# Patient Record
Sex: MALE | Race: WHITE | Employment: OTHER | URBAN - METROPOLITAN AREA
[De-identification: names, ages, dates, MRNs, and addresses within clinical notes are randomized per-mention and may not be internally consistent; named-entity substitution may affect disease eponyms.]

---

## 2017-01-16 ENCOUNTER — TELEPHONE (OUTPATIENT)
Dept: INTERNAL MEDICINE CLINIC | Age: 77
End: 2017-01-16

## 2017-01-19 ENCOUNTER — OFFICE VISIT (OUTPATIENT)
Dept: INTERNAL MEDICINE CLINIC | Age: 77
End: 2017-01-19

## 2017-01-19 VITALS
BODY MASS INDEX: 35.46 KG/M2 | HEART RATE: 64 BPM | WEIGHT: 234 LBS | SYSTOLIC BLOOD PRESSURE: 150 MMHG | DIASTOLIC BLOOD PRESSURE: 74 MMHG | HEIGHT: 68 IN

## 2017-01-19 DIAGNOSIS — M79.601 PARESTHESIA AND PAIN OF BOTH UPPER EXTREMITIES: ICD-10-CM

## 2017-01-19 DIAGNOSIS — M79.602 PARESTHESIA AND PAIN OF BOTH UPPER EXTREMITIES: ICD-10-CM

## 2017-01-19 DIAGNOSIS — I10 ESSENTIAL HYPERTENSION, BENIGN: Primary | ICD-10-CM

## 2017-01-19 DIAGNOSIS — R20.2 PARESTHESIA AND PAIN OF BOTH UPPER EXTREMITIES: ICD-10-CM

## 2017-01-19 DIAGNOSIS — C43.62 MALIGNANT MELANOMA OF SKIN OF LEFT UPPER EXTREMITY, INCLUDING SHOULDER (HCC): ICD-10-CM

## 2017-01-19 PROCEDURE — 99214 OFFICE O/P EST MOD 30 MIN: CPT | Performed by: INTERNAL MEDICINE

## 2017-02-09 ENCOUNTER — OFFICE VISIT (OUTPATIENT)
Dept: INTERNAL MEDICINE CLINIC | Age: 77
End: 2017-02-09

## 2017-02-09 VITALS
HEIGHT: 68 IN | BODY MASS INDEX: 35.31 KG/M2 | SYSTOLIC BLOOD PRESSURE: 140 MMHG | HEART RATE: 64 BPM | TEMPERATURE: 98.1 F | DIASTOLIC BLOOD PRESSURE: 80 MMHG | WEIGHT: 233 LBS

## 2017-02-09 DIAGNOSIS — R05.9 COUGH: ICD-10-CM

## 2017-02-09 DIAGNOSIS — J01.10 ACUTE FRONTAL SINUSITIS, RECURRENCE NOT SPECIFIED: Primary | ICD-10-CM

## 2017-02-09 PROCEDURE — 99213 OFFICE O/P EST LOW 20 MIN: CPT | Performed by: INTERNAL MEDICINE

## 2017-02-09 RX ORDER — LEVOFLOXACIN 500 MG/1
500 TABLET, FILM COATED ORAL DAILY
Qty: 10 TABLET | Refills: 0 | Status: SHIPPED | OUTPATIENT
Start: 2017-02-09 | End: 2017-02-19

## 2017-03-02 ENCOUNTER — TELEPHONE (OUTPATIENT)
Dept: INTERNAL MEDICINE CLINIC | Age: 77
End: 2017-03-02

## 2017-07-18 ENCOUNTER — TELEPHONE (OUTPATIENT)
Dept: PULMONOLOGY | Age: 77
End: 2017-07-18

## 2017-07-18 ENCOUNTER — OFFICE VISIT (OUTPATIENT)
Dept: INTERNAL MEDICINE CLINIC | Age: 77
End: 2017-07-18

## 2017-07-18 DIAGNOSIS — R91.8 HILAR MASS: ICD-10-CM

## 2017-07-18 DIAGNOSIS — C43.62 MALIGNANT MELANOMA OF SKIN OF LEFT UPPER EXTREMITY, INCLUDING SHOULDER (HCC): Primary | ICD-10-CM

## 2017-07-18 DIAGNOSIS — I10 BENIGN ESSENTIAL HYPERTENSION: ICD-10-CM

## 2017-07-18 PROCEDURE — 99214 OFFICE O/P EST MOD 30 MIN: CPT | Performed by: INTERNAL MEDICINE

## 2017-07-25 ENCOUNTER — TELEPHONE (OUTPATIENT)
Dept: PULMONOLOGY | Age: 77
End: 2017-07-25

## 2017-07-25 ENCOUNTER — TELEPHONE (OUTPATIENT)
Dept: INTERNAL MEDICINE CLINIC | Age: 77
End: 2017-07-25

## 2017-07-28 ENCOUNTER — TELEPHONE (OUTPATIENT)
Dept: INTERNAL MEDICINE CLINIC | Age: 77
End: 2017-07-28

## 2017-07-31 ENCOUNTER — TELEPHONE (OUTPATIENT)
Dept: INTERNAL MEDICINE CLINIC | Age: 77
End: 2017-07-31

## 2017-08-06 PROBLEM — Z51.12 ENCOUNTER FOR ANTINEOPLASTIC IMMUNOTHERAPY: Status: ACTIVE | Noted: 2017-08-06

## 2017-10-06 ENCOUNTER — OFFICE VISIT (OUTPATIENT)
Dept: INTERNAL MEDICINE CLINIC | Age: 77
End: 2017-10-06

## 2017-10-06 VITALS
SYSTOLIC BLOOD PRESSURE: 138 MMHG | DIASTOLIC BLOOD PRESSURE: 80 MMHG | HEART RATE: 76 BPM | WEIGHT: 241 LBS | BODY MASS INDEX: 36.53 KG/M2

## 2017-10-06 DIAGNOSIS — C43.62 MALIGNANT MELANOMA OF SKIN OF LEFT UPPER EXTREMITY, INCLUDING SHOULDER (HCC): ICD-10-CM

## 2017-10-06 DIAGNOSIS — I10 ESSENTIAL HYPERTENSION, BENIGN: ICD-10-CM

## 2017-10-06 DIAGNOSIS — R60.0 LOCALIZED EDEMA: Primary | ICD-10-CM

## 2017-10-06 PROCEDURE — 99214 OFFICE O/P EST MOD 30 MIN: CPT | Performed by: INTERNAL MEDICINE

## 2017-10-06 RX ORDER — SPIRONOLACTONE 50 MG/1
25 TABLET, FILM COATED ORAL 2 TIMES DAILY
Qty: 180 TABLET | Refills: 3 | Status: SHIPPED | OUTPATIENT
Start: 2017-10-06 | End: 2018-10-26 | Stop reason: SDUPTHER

## 2017-10-06 RX ORDER — FUROSEMIDE 20 MG/1
TABLET ORAL DAILY PRN
Status: ON HOLD | COMMUNITY
Start: 2017-09-20 | End: 2019-07-22

## 2017-10-06 NOTE — MR AVS SNAPSHOT
After Visit Summary             Megan Lord   10/6/2017 9:00 AM   Office Visit    Description:  Male : 1940   Provider:  Ruslan Kamara MD   Department:  [facility]              Your Follow-Up and Future Appointments         Below is a list of your follow-up and future appointments. This may not be a complete list as you may have made appointments directly with providers that we are not aware of or your providers may have made some for you. Please call your providers to confirm appointments. It is important to keep your appointments. Please bring your current insurance card, photo ID, co-pay, and all medication bottles to your appointment. If self-pay, payment is expected at the time of service. Your To-Do List     Future Appointments Provider Department Dept Phone    2018 8:30 AM Ruslan Kamara MD [facility] [phone]    Please arrive 15 minutes prior to appointment, bring photo ID and insurance card. Follow-Up    Return if symptoms worsen or fail to improve. Information from Your Visit        Department     Name Address Phone Fax    [facility] [address] [employer] [phone]      Vital Signs     Blood Pressure Pulse Weight Body Mass Index Smoking Status       138/80 76 241 lb (109.3 kg) 36.53 kg/m2 Former Smoker       Additional Information about your Body Mass Index (BMI)           Your BMI as listed above is considered obese (30 or more). BMI is an estimate of body fat, calculated from your height and weight. The higher your BMI, the greater your risk of heart disease, high blood pressure, type 2 diabetes, stroke, gallstones, arthritis, sleep apnea, and certain cancers. BMI is not perfect. It may overestimate body fat in athletes and people who are more muscular.   Even a small weight loss (between 5 and 10 percent of your current weight) by decreasing your calorie intake and becoming more physically active will help lower your risk of developing or worsening diseases associated with obesity. Learn more at: [email or web address]             Today's Medication Changes          These changes are accurate as of: 10/6/17  9:27 AM.  If you have any questions, ask your nurse or doctor. START taking these medications           spironolactone 50 MG tablet   Commonly known as:  ALDACTONE   Instructions: Take 0.5 tablets by mouth 2 times daily   Quantity:  180 tablet   Refills:  3   Started by: Shwetha Hathaway MD         STOP taking these medications           clobetasol 0.05 % ointment   Commonly known as:  Alexi Lawman by: Shwetha Hathaway MD       nystatin 931984 UNIT/ML suspension   Commonly known as:  MYCOSTATIN   Stopped by: Shwetha Hathaway MD       potassium chloride 10 MEQ extended release tablet   Commonly known as:  KLOR-CON M   Stopped by: Shwetha Hathaway MD       predniSONE 5 MG tablet   Commonly known as:  Puneet Jac by: Shwetha Hathaway MD       Trametinib Dimethyl Sulfoxide 2 MG Tabs   Commonly known as:  MEKINIST   Stopped by:   Shwetha Hathaway MD            Where to Get Your Medications      These medications were sent to [address] [place] [address], [address] [facility] [phone]  [address] [place] [address],[address] [id number]     Phone:  [phone]     spironolactone 50 MG tablet               Your Current Medications Are              spironolactone (ALDACTONE) 50 MG tablet Take 0.5 tablets by mouth 2 times daily    furosemide (LASIX) 20 MG tablet     Dabrafenib Mesylate 75 MG CAPS Take 150 mg by mouth 2 times daily    aspirin 325 MG tablet Take 1 tablet by mouth every 6 hours as needed Last dose 3/7/16    magnesium hydroxide (MILK OF MAGNESIA) 400 MG/5ML suspension Take by

## 2017-10-06 NOTE — PROGRESS NOTES
symmetric, no tenderness/mass/nodules, no carotid bruit or JVD   Lungs:   Clear to auscultation bilaterally, respirations unlabored   Chest Wall:  No tenderness or deformity   Heart:  Regular rate and rhythm, S1, S2 normal, no murmur, rub or gallop, he does have 2+ edema to the midcalf bilaterally    Abdomen:   Soft, non-tender, bowel sounds active all four quadrants,  no masses, no organomegaly genitourinary no hernias, no testicular masses    Skin: Skin color, texture, turgor normal, no rashes or lesions   Lymph nodes: Cervical, supraclavicular  Adenopathy is absent   Extremities  2+ edema to the knees bilaterally      No components found for: CHLPL  Lab Results   Component Value Date    TRIG 101 09/30/2016    TRIG 95 09/23/2015    TRIG 101 08/11/2014     Lab Results   Component Value Date    HDL 49 09/30/2016    HDL 56 09/23/2015    HDL 53 08/11/2014     Lab Results   Component Value Date    LDLCALC 120 (H) 09/30/2016    LDLCALC 136 (H) 09/23/2015    LDLCALC 132 (H) 08/11/2014     Lab Results   Component Value Date    LABVLDL 20 09/30/2016    LABVLDL 19 09/23/2015    LABVLDL 20 08/11/2014     Lab Results   Component Value Date    CREATININE 1.1 08/23/2017         ASSESSMENT/PLAN[de-identified]       1. Localized edema     2. Malignant melanoma of skin of left upper extremity, including shoulder (HCC)     3. Essential hypertension, benign          I see no evidence of acute liver or kidney disease he does not have symptoms of heart failure.     I believe his symptoms are secondary to body habitus, venous insufficiency    Encouraged the use of compression stockings which may be beneficial for him    Start spironolactone since he continues to be symptomatic and Lasix is not effective    Advised to stop the potassium while taking spironolactone

## 2017-11-28 ENCOUNTER — TELEPHONE (OUTPATIENT)
Dept: INTERNAL MEDICINE CLINIC | Age: 77
End: 2017-11-28

## 2018-01-23 ENCOUNTER — OFFICE VISIT (OUTPATIENT)
Dept: INTERNAL MEDICINE CLINIC | Age: 78
End: 2018-01-23

## 2018-01-23 VITALS
WEIGHT: 237 LBS | BODY MASS INDEX: 35.92 KG/M2 | DIASTOLIC BLOOD PRESSURE: 80 MMHG | HEART RATE: 72 BPM | SYSTOLIC BLOOD PRESSURE: 138 MMHG

## 2018-01-23 DIAGNOSIS — I10 ESSENTIAL HYPERTENSION, BENIGN: Primary | ICD-10-CM

## 2018-01-23 DIAGNOSIS — C43.62 MALIGNANT MELANOMA OF SKIN OF LEFT UPPER EXTREMITY, INCLUDING SHOULDER (HCC): ICD-10-CM

## 2018-01-23 PROCEDURE — G8484 FLU IMMUNIZE NO ADMIN: HCPCS | Performed by: INTERNAL MEDICINE

## 2018-01-23 PROCEDURE — 4040F PNEUMOC VAC/ADMIN/RCVD: CPT | Performed by: INTERNAL MEDICINE

## 2018-01-23 PROCEDURE — 99214 OFFICE O/P EST MOD 30 MIN: CPT | Performed by: INTERNAL MEDICINE

## 2018-01-23 PROCEDURE — G8427 DOCREV CUR MEDS BY ELIG CLIN: HCPCS | Performed by: INTERNAL MEDICINE

## 2018-01-23 PROCEDURE — 3288F FALL RISK ASSESSMENT DOCD: CPT | Performed by: INTERNAL MEDICINE

## 2018-01-23 PROCEDURE — 1036F TOBACCO NON-USER: CPT | Performed by: INTERNAL MEDICINE

## 2018-01-23 PROCEDURE — G8417 CALC BMI ABV UP PARAM F/U: HCPCS | Performed by: INTERNAL MEDICINE

## 2018-01-23 PROCEDURE — 1123F ACP DISCUSS/DSCN MKR DOCD: CPT | Performed by: INTERNAL MEDICINE

## 2018-04-09 ENCOUNTER — OFFICE VISIT (OUTPATIENT)
Dept: INTERNAL MEDICINE CLINIC | Age: 78
End: 2018-04-09

## 2018-04-09 ENCOUNTER — TELEPHONE (OUTPATIENT)
Dept: INTERNAL MEDICINE CLINIC | Age: 78
End: 2018-04-09

## 2018-04-09 VITALS
HEART RATE: 80 BPM | BODY MASS INDEX: 35.4 KG/M2 | WEIGHT: 239 LBS | SYSTOLIC BLOOD PRESSURE: 120 MMHG | HEIGHT: 69 IN | DIASTOLIC BLOOD PRESSURE: 60 MMHG

## 2018-04-09 DIAGNOSIS — L03.116 CELLULITIS OF LEFT LOWER EXTREMITY: Primary | ICD-10-CM

## 2018-04-09 PROCEDURE — 99213 OFFICE O/P EST LOW 20 MIN: CPT | Performed by: INTERNAL MEDICINE

## 2018-04-09 PROCEDURE — 1036F TOBACCO NON-USER: CPT | Performed by: INTERNAL MEDICINE

## 2018-04-09 PROCEDURE — 1123F ACP DISCUSS/DSCN MKR DOCD: CPT | Performed by: INTERNAL MEDICINE

## 2018-04-09 PROCEDURE — G8428 CUR MEDS NOT DOCUMENT: HCPCS | Performed by: INTERNAL MEDICINE

## 2018-04-09 PROCEDURE — G8417 CALC BMI ABV UP PARAM F/U: HCPCS | Performed by: INTERNAL MEDICINE

## 2018-04-09 PROCEDURE — 4040F PNEUMOC VAC/ADMIN/RCVD: CPT | Performed by: INTERNAL MEDICINE

## 2018-04-09 RX ORDER — SULFAMETHOXAZOLE AND TRIMETHOPRIM 800; 160 MG/1; MG/1
1 TABLET ORAL 2 TIMES DAILY
Qty: 28 TABLET | Refills: 0 | Status: SHIPPED | OUTPATIENT
Start: 2018-04-09 | End: 2018-04-23

## 2018-04-11 ENCOUNTER — NURSE ONLY (OUTPATIENT)
Dept: INTERNAL MEDICINE CLINIC | Age: 78
End: 2018-04-11

## 2018-04-11 ENCOUNTER — TELEPHONE (OUTPATIENT)
Dept: INTERNAL MEDICINE CLINIC | Age: 78
End: 2018-04-11

## 2018-04-11 DIAGNOSIS — I49.9 IRREGULAR HEART BEAT: Primary | ICD-10-CM

## 2018-04-11 DIAGNOSIS — I49.9 IRREGULAR HEART BEAT: ICD-10-CM

## 2018-04-11 LAB — POTASSIUM SERPL-SCNC: 4.4 MMOL/L (ref 3.5–5.1)

## 2018-04-11 PROCEDURE — 93000 ELECTROCARDIOGRAM COMPLETE: CPT | Performed by: INTERNAL MEDICINE

## 2018-04-11 PROCEDURE — 99212 OFFICE O/P EST SF 10 MIN: CPT | Performed by: INTERNAL MEDICINE

## 2018-04-20 LAB
CHOLESTEROL, TOTAL: 191 MG/DL (ref 0–199)
HDLC SERPL-MCNC: 43 MG/DL (ref 40–60)
LDL CHOLESTEROL CALCULATED: 129 MG/DL
TRIGL SERPL-MCNC: 94 MG/DL (ref 0–150)
VLDLC SERPL CALC-MCNC: 19 MG/DL

## 2018-05-25 ENCOUNTER — OFFICE VISIT (OUTPATIENT)
Dept: PULMONOLOGY | Age: 78
End: 2018-05-25

## 2018-05-25 VITALS
HEART RATE: 79 BPM | SYSTOLIC BLOOD PRESSURE: 128 MMHG | DIASTOLIC BLOOD PRESSURE: 72 MMHG | BODY MASS INDEX: 35.51 KG/M2 | OXYGEN SATURATION: 97 % | WEIGHT: 237 LBS

## 2018-05-25 DIAGNOSIS — R59.0 MEDIASTINAL ADENOPATHY: ICD-10-CM

## 2018-05-25 DIAGNOSIS — C78.02 SECONDARY MALIGNANT NEOPLASM OF LEFT LUNG (HCC): Primary | ICD-10-CM

## 2018-05-25 PROCEDURE — 99214 OFFICE O/P EST MOD 30 MIN: CPT | Performed by: INTERNAL MEDICINE

## 2018-05-25 PROCEDURE — G8427 DOCREV CUR MEDS BY ELIG CLIN: HCPCS | Performed by: INTERNAL MEDICINE

## 2018-05-25 PROCEDURE — 1036F TOBACCO NON-USER: CPT | Performed by: INTERNAL MEDICINE

## 2018-05-25 PROCEDURE — G8417 CALC BMI ABV UP PARAM F/U: HCPCS | Performed by: INTERNAL MEDICINE

## 2018-05-25 PROCEDURE — 4040F PNEUMOC VAC/ADMIN/RCVD: CPT | Performed by: INTERNAL MEDICINE

## 2018-05-25 PROCEDURE — 1123F ACP DISCUSS/DSCN MKR DOCD: CPT | Performed by: INTERNAL MEDICINE

## 2018-05-29 ENCOUNTER — TELEPHONE (OUTPATIENT)
Dept: PULMONOLOGY | Age: 78
End: 2018-05-29

## 2018-05-30 PROBLEM — R59.0 MEDIASTINAL ADENOPATHY: Status: ACTIVE | Noted: 2018-05-30

## 2018-06-08 ENCOUNTER — HOSPITAL ENCOUNTER (OUTPATIENT)
Dept: ENDOSCOPY | Age: 78
Discharge: HOME | End: 2018-06-08
Attending: INTERNAL MEDICINE | Admitting: INTERNAL MEDICINE

## 2018-06-08 VITALS
BODY MASS INDEX: 35.92 KG/M2 | HEART RATE: 66 BPM | RESPIRATION RATE: 16 BRPM | WEIGHT: 237 LBS | DIASTOLIC BLOOD PRESSURE: 53 MMHG | HEIGHT: 68 IN | SYSTOLIC BLOOD PRESSURE: 112 MMHG | OXYGEN SATURATION: 99 % | TEMPERATURE: 98.2 F

## 2018-06-08 LAB
APTT: 32 SEC (ref 26–36)
BASOPHILS ABSOLUTE: 0.2 K/UL (ref 0–0.2)
BASOPHILS RELATIVE PERCENT: 2.3 %
EOSINOPHILS ABSOLUTE: 0.7 K/UL (ref 0–0.6)
EOSINOPHILS RELATIVE PERCENT: 9.4 %
HCT VFR BLD CALC: 45 % (ref 40.5–52.5)
HEMOGLOBIN: 14.8 G/DL (ref 13.5–17.5)
INR BLD: 1.04 (ref 0.86–1.14)
LYMPHOCYTES ABSOLUTE: 0.9 K/UL (ref 1–5.1)
LYMPHOCYTES RELATIVE PERCENT: 12.3 %
MCH RBC QN AUTO: 27.7 PG (ref 26–34)
MCHC RBC AUTO-ENTMCNC: 33 G/DL (ref 31–36)
MCV RBC AUTO: 84.1 FL (ref 80–100)
MONOCYTES ABSOLUTE: 0.7 K/UL (ref 0–1.3)
MONOCYTES RELATIVE PERCENT: 9.8 %
NEUTROPHILS ABSOLUTE: 4.6 K/UL (ref 1.7–7.7)
NEUTROPHILS RELATIVE PERCENT: 66.2 %
PDW BLD-RTO: 14 % (ref 12.4–15.4)
PLATELET # BLD: 304 K/UL (ref 135–450)
PMV BLD AUTO: 6.8 FL (ref 5–10.5)
PROTHROMBIN TIME: 11.9 SEC (ref 9.8–13)
RBC # BLD: 5.36 M/UL (ref 4.2–5.9)
WBC # BLD: 7 K/UL (ref 4–11)

## 2018-06-08 PROCEDURE — 31652 BRONCH EBUS SAMPLNG 1/2 NODE: CPT | Performed by: INTERNAL MEDICINE

## 2018-06-08 RX ORDER — DIPHENHYDRAMINE HCL 25 MG
25 CAPSULE ORAL PRN
Status: ON HOLD | COMMUNITY
End: 2020-01-01 | Stop reason: HOSPADM

## 2018-06-08 RX ORDER — PSEUDOEPHEDRINE HYDROCHLORIDE 30 MG/1
30 TABLET ORAL EVERY 4 HOURS PRN
Status: ON HOLD | COMMUNITY
End: 2019-07-29 | Stop reason: HOSPADM

## 2018-06-08 ASSESSMENT — ENCOUNTER SYMPTOMS: SHORTNESS OF BREATH: 1

## 2018-06-08 ASSESSMENT — PAIN SCALES - GENERAL
PAINLEVEL_OUTOF10: 0

## 2018-06-08 ASSESSMENT — PAIN - FUNCTIONAL ASSESSMENT: PAIN_FUNCTIONAL_ASSESSMENT: 0-10

## 2018-06-10 LAB
CULTURE, RESPIRATORY: NORMAL
GRAM STAIN RESULT: NORMAL

## 2018-07-09 LAB
FUNGUS (MYCOLOGY) CULTURE: NORMAL
FUNGUS STAIN: NORMAL

## 2018-07-24 ENCOUNTER — OFFICE VISIT (OUTPATIENT)
Dept: INTERNAL MEDICINE CLINIC | Age: 78
End: 2018-07-24

## 2018-07-24 VITALS
WEIGHT: 239 LBS | BODY MASS INDEX: 36.34 KG/M2 | SYSTOLIC BLOOD PRESSURE: 136 MMHG | DIASTOLIC BLOOD PRESSURE: 80 MMHG | HEART RATE: 64 BPM

## 2018-07-24 DIAGNOSIS — I10 ESSENTIAL HYPERTENSION, BENIGN: Primary | ICD-10-CM

## 2018-07-24 DIAGNOSIS — C78.02 SECONDARY MALIGNANT NEOPLASM OF LEFT LUNG (HCC): ICD-10-CM

## 2018-07-24 LAB
AFB CULTURE (MYCOBACTERIA): NORMAL
AFB SMEAR: NORMAL

## 2018-07-24 PROCEDURE — 99214 OFFICE O/P EST MOD 30 MIN: CPT | Performed by: INTERNAL MEDICINE

## 2018-07-24 PROCEDURE — 1101F PT FALLS ASSESS-DOCD LE1/YR: CPT | Performed by: INTERNAL MEDICINE

## 2018-07-24 PROCEDURE — G8417 CALC BMI ABV UP PARAM F/U: HCPCS | Performed by: INTERNAL MEDICINE

## 2018-07-24 PROCEDURE — 1036F TOBACCO NON-USER: CPT | Performed by: INTERNAL MEDICINE

## 2018-07-24 PROCEDURE — G8510 SCR DEP NEG, NO PLAN REQD: HCPCS | Performed by: INTERNAL MEDICINE

## 2018-07-24 PROCEDURE — G8427 DOCREV CUR MEDS BY ELIG CLIN: HCPCS | Performed by: INTERNAL MEDICINE

## 2018-07-24 PROCEDURE — 4040F PNEUMOC VAC/ADMIN/RCVD: CPT | Performed by: INTERNAL MEDICINE

## 2018-07-24 PROCEDURE — 1123F ACP DISCUSS/DSCN MKR DOCD: CPT | Performed by: INTERNAL MEDICINE

## 2018-07-24 ASSESSMENT — PATIENT HEALTH QUESTIONNAIRE - PHQ9
SUM OF ALL RESPONSES TO PHQ QUESTIONS 1-9: 1
1. LITTLE INTEREST OR PLEASURE IN DOING THINGS: 1
SUM OF ALL RESPONSES TO PHQ9 QUESTIONS 1 & 2: 1
2. FEELING DOWN, DEPRESSED OR HOPELESS: 0

## 2018-10-18 ENCOUNTER — OFFICE VISIT (OUTPATIENT)
Dept: INTERNAL MEDICINE CLINIC | Age: 78
End: 2018-10-18
Payer: MEDICARE

## 2018-10-18 VITALS
DIASTOLIC BLOOD PRESSURE: 78 MMHG | HEART RATE: 72 BPM | SYSTOLIC BLOOD PRESSURE: 120 MMHG | WEIGHT: 237 LBS | BODY MASS INDEX: 36.04 KG/M2

## 2018-10-18 DIAGNOSIS — C44.99 SKIN CARCINOMA: Primary | ICD-10-CM

## 2018-10-18 PROCEDURE — 1036F TOBACCO NON-USER: CPT | Performed by: INTERNAL MEDICINE

## 2018-10-18 PROCEDURE — G8417 CALC BMI ABV UP PARAM F/U: HCPCS | Performed by: INTERNAL MEDICINE

## 2018-10-18 PROCEDURE — G8427 DOCREV CUR MEDS BY ELIG CLIN: HCPCS | Performed by: INTERNAL MEDICINE

## 2018-10-18 PROCEDURE — 1123F ACP DISCUSS/DSCN MKR DOCD: CPT | Performed by: INTERNAL MEDICINE

## 2018-10-18 PROCEDURE — 99213 OFFICE O/P EST LOW 20 MIN: CPT | Performed by: INTERNAL MEDICINE

## 2018-10-18 PROCEDURE — G8484 FLU IMMUNIZE NO ADMIN: HCPCS | Performed by: INTERNAL MEDICINE

## 2018-10-18 PROCEDURE — 1101F PT FALLS ASSESS-DOCD LE1/YR: CPT | Performed by: INTERNAL MEDICINE

## 2018-10-18 PROCEDURE — 4040F PNEUMOC VAC/ADMIN/RCVD: CPT | Performed by: INTERNAL MEDICINE

## 2018-10-26 RX ORDER — SPIRONOLACTONE 50 MG/1
TABLET, FILM COATED ORAL
Qty: 90 TABLET | Refills: 3 | Status: ON HOLD | OUTPATIENT
Start: 2018-10-26 | End: 2019-07-22

## 2019-01-01 ENCOUNTER — OFFICE VISIT (OUTPATIENT)
Dept: INTERNAL MEDICINE CLINIC | Age: 79
End: 2019-01-01
Payer: MEDICARE

## 2019-01-01 VITALS
SYSTOLIC BLOOD PRESSURE: 122 MMHG | BODY MASS INDEX: 29.32 KG/M2 | DIASTOLIC BLOOD PRESSURE: 68 MMHG | HEART RATE: 61 BPM | WEIGHT: 192.8 LBS

## 2019-01-01 DIAGNOSIS — I10 BENIGN ESSENTIAL HYPERTENSION: ICD-10-CM

## 2019-01-01 DIAGNOSIS — C43.62 MALIGNANT MELANOMA OF SKIN OF LEFT UPPER EXTREMITY, INCLUDING SHOULDER (HCC): ICD-10-CM

## 2019-01-01 DIAGNOSIS — E87.6 HYPOKALEMIA: Primary | ICD-10-CM

## 2019-01-01 PROCEDURE — 99214 OFFICE O/P EST MOD 30 MIN: CPT | Performed by: INTERNAL MEDICINE

## 2019-01-01 PROCEDURE — 1036F TOBACCO NON-USER: CPT | Performed by: INTERNAL MEDICINE

## 2019-01-01 PROCEDURE — G8427 DOCREV CUR MEDS BY ELIG CLIN: HCPCS | Performed by: INTERNAL MEDICINE

## 2019-01-01 PROCEDURE — 4040F PNEUMOC VAC/ADMIN/RCVD: CPT | Performed by: INTERNAL MEDICINE

## 2019-01-01 PROCEDURE — 1123F ACP DISCUSS/DSCN MKR DOCD: CPT | Performed by: INTERNAL MEDICINE

## 2019-01-01 PROCEDURE — G8417 CALC BMI ABV UP PARAM F/U: HCPCS | Performed by: INTERNAL MEDICINE

## 2019-01-01 PROCEDURE — G8484 FLU IMMUNIZE NO ADMIN: HCPCS | Performed by: INTERNAL MEDICINE

## 2019-01-01 RX ORDER — SPIRONOLACTONE 50 MG/1
25 TABLET, FILM COATED ORAL DAILY
COMMUNITY
End: 2019-01-01 | Stop reason: SDUPTHER

## 2019-01-01 RX ORDER — SPIRONOLACTONE 50 MG/1
50 TABLET, FILM COATED ORAL DAILY
Qty: 90 TABLET | Refills: 3 | Status: SHIPPED | OUTPATIENT
Start: 2019-01-01 | End: 2020-01-01

## 2019-01-01 RX ORDER — PREDNISONE 10 MG/1
10 TABLET ORAL DAILY
Status: ON HOLD | COMMUNITY
End: 2020-01-01 | Stop reason: HOSPADM

## 2019-01-23 ENCOUNTER — OFFICE VISIT (OUTPATIENT)
Dept: INTERNAL MEDICINE CLINIC | Age: 79
End: 2019-01-23
Payer: MEDICARE

## 2019-01-23 VITALS
HEART RATE: 64 BPM | SYSTOLIC BLOOD PRESSURE: 120 MMHG | DIASTOLIC BLOOD PRESSURE: 62 MMHG | BODY MASS INDEX: 36.04 KG/M2 | WEIGHT: 237 LBS

## 2019-01-23 DIAGNOSIS — M79.10 MUSCLE PAIN: ICD-10-CM

## 2019-01-23 DIAGNOSIS — I10 BENIGN ESSENTIAL HYPERTENSION: ICD-10-CM

## 2019-01-23 DIAGNOSIS — R10.84 GENERALIZED ABDOMINAL PAIN: Primary | ICD-10-CM

## 2019-01-23 DIAGNOSIS — R10.84 GENERALIZED ABDOMINAL PAIN: ICD-10-CM

## 2019-01-23 LAB
A/G RATIO: 2 (ref 1.1–2.2)
ALBUMIN SERPL-MCNC: 3.9 G/DL (ref 3.4–5)
ALP BLD-CCNC: 94 U/L (ref 40–129)
ALT SERPL-CCNC: 13 U/L (ref 10–40)
ANION GAP SERPL CALCULATED.3IONS-SCNC: 14 MMOL/L (ref 3–16)
AST SERPL-CCNC: 15 U/L (ref 15–37)
BASOPHILS ABSOLUTE: 0.1 K/UL (ref 0–0.2)
BASOPHILS RELATIVE PERCENT: 1.9 %
BILIRUB SERPL-MCNC: 1 MG/DL (ref 0–1)
BUN BLDV-MCNC: 17 MG/DL (ref 7–20)
CALCIUM SERPL-MCNC: 9.6 MG/DL (ref 8.3–10.6)
CHLORIDE BLD-SCNC: 108 MMOL/L (ref 99–110)
CO2: 21 MMOL/L (ref 21–32)
CREAT SERPL-MCNC: 1.1 MG/DL (ref 0.8–1.3)
EOSINOPHILS ABSOLUTE: 0.4 K/UL (ref 0–0.6)
EOSINOPHILS RELATIVE PERCENT: 7.5 %
GFR AFRICAN AMERICAN: >60
GFR NON-AFRICAN AMERICAN: >60
GLOBULIN: 2 G/DL
GLUCOSE BLD-MCNC: 94 MG/DL (ref 70–99)
HCT VFR BLD CALC: 43.4 % (ref 40.5–52.5)
HEMOGLOBIN: 14.8 G/DL (ref 13.5–17.5)
LYMPHOCYTES ABSOLUTE: 1.1 K/UL (ref 1–5.1)
LYMPHOCYTES RELATIVE PERCENT: 18.5 %
MCH RBC QN AUTO: 30 PG (ref 26–34)
MCHC RBC AUTO-ENTMCNC: 34.1 G/DL (ref 31–36)
MCV RBC AUTO: 88 FL (ref 80–100)
MONOCYTES ABSOLUTE: 0.7 K/UL (ref 0–1.3)
MONOCYTES RELATIVE PERCENT: 11 %
NEUTROPHILS ABSOLUTE: 3.6 K/UL (ref 1.7–7.7)
NEUTROPHILS RELATIVE PERCENT: 61.1 %
PDW BLD-RTO: 14 % (ref 12.4–15.4)
PLATELET # BLD: 278 K/UL (ref 135–450)
PMV BLD AUTO: 7.7 FL (ref 5–10.5)
POTASSIUM SERPL-SCNC: 4.3 MMOL/L (ref 3.5–5.1)
RBC # BLD: 4.94 M/UL (ref 4.2–5.9)
SODIUM BLD-SCNC: 143 MMOL/L (ref 136–145)
TOTAL CK: 94 U/L (ref 39–308)
TOTAL PROTEIN: 5.9 G/DL (ref 6.4–8.2)
TSH REFLEX FT4: 2.32 UIU/ML (ref 0.27–4.2)
WBC # BLD: 5.9 K/UL (ref 4–11)

## 2019-01-23 PROCEDURE — 99214 OFFICE O/P EST MOD 30 MIN: CPT | Performed by: INTERNAL MEDICINE

## 2019-01-23 PROCEDURE — G8484 FLU IMMUNIZE NO ADMIN: HCPCS | Performed by: INTERNAL MEDICINE

## 2019-01-23 PROCEDURE — G8427 DOCREV CUR MEDS BY ELIG CLIN: HCPCS | Performed by: INTERNAL MEDICINE

## 2019-01-23 PROCEDURE — 1123F ACP DISCUSS/DSCN MKR DOCD: CPT | Performed by: INTERNAL MEDICINE

## 2019-01-23 PROCEDURE — 1101F PT FALLS ASSESS-DOCD LE1/YR: CPT | Performed by: INTERNAL MEDICINE

## 2019-01-23 PROCEDURE — G8417 CALC BMI ABV UP PARAM F/U: HCPCS | Performed by: INTERNAL MEDICINE

## 2019-01-23 PROCEDURE — 1036F TOBACCO NON-USER: CPT | Performed by: INTERNAL MEDICINE

## 2019-01-23 PROCEDURE — 4040F PNEUMOC VAC/ADMIN/RCVD: CPT | Performed by: INTERNAL MEDICINE

## 2019-01-24 ENCOUNTER — TELEPHONE (OUTPATIENT)
Dept: INTERNAL MEDICINE CLINIC | Age: 79
End: 2019-01-24

## 2019-03-01 ENCOUNTER — TELEPHONE (OUTPATIENT)
Dept: INTERNAL MEDICINE CLINIC | Age: 79
End: 2019-03-01

## 2019-03-01 RX ORDER — OMEPRAZOLE 20 MG/1
40 CAPSULE, DELAYED RELEASE ORAL DAILY PRN
Status: ON HOLD | COMMUNITY
End: 2019-07-22

## 2019-04-03 ENCOUNTER — OFFICE VISIT (OUTPATIENT)
Dept: INTERNAL MEDICINE CLINIC | Age: 79
End: 2019-04-03
Payer: MEDICARE

## 2019-04-03 VITALS
WEIGHT: 218 LBS | BODY MASS INDEX: 33.15 KG/M2 | SYSTOLIC BLOOD PRESSURE: 120 MMHG | DIASTOLIC BLOOD PRESSURE: 80 MMHG | HEART RATE: 72 BPM

## 2019-04-03 DIAGNOSIS — Z91.81 AT HIGH RISK FOR FALLS: ICD-10-CM

## 2019-04-03 DIAGNOSIS — R68.81 EARLY SATIETY: Primary | ICD-10-CM

## 2019-04-03 DIAGNOSIS — C43.8 MALIGNANT MELANOMA OF OVERLAPPING SITES (HCC): ICD-10-CM

## 2019-04-03 PROCEDURE — G8417 CALC BMI ABV UP PARAM F/U: HCPCS | Performed by: INTERNAL MEDICINE

## 2019-04-03 PROCEDURE — G8427 DOCREV CUR MEDS BY ELIG CLIN: HCPCS | Performed by: INTERNAL MEDICINE

## 2019-04-03 PROCEDURE — 1036F TOBACCO NON-USER: CPT | Performed by: INTERNAL MEDICINE

## 2019-04-03 PROCEDURE — 99214 OFFICE O/P EST MOD 30 MIN: CPT | Performed by: INTERNAL MEDICINE

## 2019-04-03 PROCEDURE — 4040F PNEUMOC VAC/ADMIN/RCVD: CPT | Performed by: INTERNAL MEDICINE

## 2019-04-03 PROCEDURE — 1123F ACP DISCUSS/DSCN MKR DOCD: CPT | Performed by: INTERNAL MEDICINE

## 2019-04-03 ASSESSMENT — PATIENT HEALTH QUESTIONNAIRE - PHQ9
1. LITTLE INTEREST OR PLEASURE IN DOING THINGS: 1
SUM OF ALL RESPONSES TO PHQ QUESTIONS 1-9: 1
SUM OF ALL RESPONSES TO PHQ QUESTIONS 1-9: 1
2. FEELING DOWN, DEPRESSED OR HOPELESS: 0
SUM OF ALL RESPONSES TO PHQ9 QUESTIONS 1 & 2: 1

## 2019-04-05 NOTE — PROGRESS NOTES
Chief Complaint   Patient presents with    Other     feels full - can't eat alot per pt       The patient is here for an acute appointment    We're contacted by his oncologist.  The patient was seen there, he complained of early satiety, 20 pound weight loss, recent laboratory showed low albumin consistent with malnutrition. He was referred back here for evaluation of early satiety and weight loss. The patient denies hematemesis, melena, cough, sputum production    His chronic dyspnea on exertion which is basically unchanged    He has had a recent CT scan done showing no evidence of metastatic disease and specifically no abnormalities are noted in the colon.              Past Medical History:   Diagnosis Date    Allergic     Arthritis     Hypertension     Lung mass     left    Melanoma ([address]     left back lyndsey geraldine    Obesity     PUD (peptic ulcer disease)     Skin cancer     Wheezing     since January           /80   Pulse 72   Wt 218 lb (98.9 kg)   BMI 33.15 kg/m²     General Appearance:  He appears chronically but not acutely ill    Head:  Normocephalic, without obvious abnormality, atraumatic   Neck: Supple, symmetrical, trachea midline, no adenopathy, thyroid: not enlarged, symmetric, no tenderness/mass/nodules, no carotid bruit or JVD   Lungs:   Clear to auscultation bilaterally, respirations unlabored   Chest Wall:  No tenderness or deformity   Heart:  Regular rate and rhythm, S1, S2 normal, no murmur, rub or gallop, he does have 2+ edema to the midcalf bilaterally    Abdomen:   Soft, non-tender, bowel sounds active all four quadrants,  no masses, no organomegaly genitourinary no hernias, no testicular masses    Skin: Skin color, texture, turgor normal, no rashes or lesions   Lymph nodes: Cervical, supraclavicular  Adenopathy is absent   Neurological  Gait is normal no focal neurological deficit noted      No components found for: CHLPL  Lab Results   Component Value Date    TRIG 94

## 2019-04-11 NOTE — PROGRESS NOTES
Patient not reached. Preop instructions left on voice mail. Number__[phone]_______      DATE__5/2/19______ TIME__0700______ARRIVAL___FEC  0530______      Nothing to eat or drink after midnight the night before,except for what the prep instructions call for. If you do not have the instructions or do not understand them please contact your doctors office. Follow any instructions your doctors office has given you including what medications to take the AM of your procedure and which ones to hold. You may use your inhalers. If you take a long acting insulin the benoit prior please cut the dose in half and take no diabetic medications that AM.Follow specific doctors office instructions regarding blood thinners and if they want you to hold and for how long. If you are on a blood thinner and have no instructions please contact the office and ask. Dress comfortably,bring your insurance card,picture ID,and a complete list of medications, including supplements. You must have a responsible adult to stay with you during the procedure,drive you home and stay with you. [facility] phone number [phone] for any questions.

## 2019-04-12 ENCOUNTER — ANESTHESIA EVENT (OUTPATIENT)
Dept: ENDOSCOPY | Age: 79
End: 2019-04-12
Payer: MEDICARE

## 2019-04-23 NOTE — PROGRESS NOTES
Pt called and asked if it would be ok to get Keytruda infusion on 5/1/19 the day before his EGD. OK per Dr. Fadi Alberto in anesthesia. Pt notified.

## 2019-05-02 ENCOUNTER — HOSPITAL ENCOUNTER (OUTPATIENT)
Age: 79
Setting detail: OUTPATIENT SURGERY
Discharge: HOME | End: 2019-05-02
Attending: INTERNAL MEDICINE | Admitting: INTERNAL MEDICINE
Payer: MEDICARE

## 2019-05-02 ENCOUNTER — ANESTHESIA (OUTPATIENT)
Dept: ENDOSCOPY | Age: 79
End: 2019-05-02
Payer: MEDICARE

## 2019-05-02 VITALS
DIASTOLIC BLOOD PRESSURE: 63 MMHG | HEART RATE: 65 BPM | WEIGHT: 205 LBS | BODY MASS INDEX: 31.07 KG/M2 | TEMPERATURE: 99.2 F | RESPIRATION RATE: 18 BRPM | SYSTOLIC BLOOD PRESSURE: 119 MMHG | OXYGEN SATURATION: 100 % | HEIGHT: 68 IN

## 2019-05-02 VITALS — OXYGEN SATURATION: 98 % | DIASTOLIC BLOOD PRESSURE: 61 MMHG | SYSTOLIC BLOOD PRESSURE: 107 MMHG

## 2019-05-02 LAB — MAGNESIUM: 2.1 MG/DL (ref 1.8–2.4)

## 2019-05-02 PROCEDURE — 7100000011 HC PHASE II RECOVERY - ADDTL 15 MIN: Performed by: INTERNAL MEDICINE

## 2019-05-02 PROCEDURE — 6370000000 HC RX 637 (ALT 250 FOR IP): Performed by: INTERNAL MEDICINE

## 2019-05-02 PROCEDURE — 88305 TISSUE EXAM BY PATHOLOGIST: CPT

## 2019-05-02 PROCEDURE — 6360000002 HC RX W HCPCS: Performed by: NURSE ANESTHETIST, CERTIFIED REGISTERED

## 2019-05-02 PROCEDURE — 2709999900 HC NON-CHARGEABLE SUPPLY: Performed by: INTERNAL MEDICINE

## 2019-05-02 PROCEDURE — 3700000000 HC ANESTHESIA ATTENDED CARE: Performed by: INTERNAL MEDICINE

## 2019-05-02 PROCEDURE — 36415 COLL VENOUS BLD VENIPUNCTURE: CPT

## 2019-05-02 PROCEDURE — 3609012400 HC EGD TRANSORAL BIOPSY SINGLE/MULTIPLE: Performed by: INTERNAL MEDICINE

## 2019-05-02 PROCEDURE — 2580000003 HC RX 258: Performed by: FAMILY MEDICINE

## 2019-05-02 PROCEDURE — 7100000010 HC PHASE II RECOVERY - FIRST 15 MIN: Performed by: INTERNAL MEDICINE

## 2019-05-02 PROCEDURE — 83735 ASSAY OF MAGNESIUM: CPT

## 2019-05-02 PROCEDURE — 3700000001 HC ADD 15 MINUTES (ANESTHESIA): Performed by: INTERNAL MEDICINE

## 2019-05-02 PROCEDURE — 2500000003 HC RX 250 WO HCPCS: Performed by: INTERNAL MEDICINE

## 2019-05-02 PROCEDURE — 2500000003 HC RX 250 WO HCPCS: Performed by: NURSE ANESTHETIST, CERTIFIED REGISTERED

## 2019-05-02 PROCEDURE — 88342 IMHCHEM/IMCYTCHM 1ST ANTB: CPT

## 2019-05-02 RX ORDER — PREDNISONE 1 MG/1
5 TABLET ORAL PRN
Status: ON HOLD | COMMUNITY
End: 2019-07-29 | Stop reason: HOSPADM

## 2019-05-02 RX ORDER — PROPOFOL 10 MG/ML
INJECTION, EMULSION INTRAVENOUS PRN
Status: DISCONTINUED | OUTPATIENT
Start: 2019-05-02 | End: 2019-05-02 | Stop reason: SDUPTHER

## 2019-05-02 RX ORDER — SODIUM CHLORIDE 0.9 % (FLUSH) 0.9 %
10 SYRINGE (ML) INJECTION PRN
Status: DISCONTINUED | OUTPATIENT
Start: 2019-05-02 | End: 2019-05-02 | Stop reason: HOSPADM

## 2019-05-02 RX ORDER — SODIUM CHLORIDE 0.9 % (FLUSH) 0.9 %
10 SYRINGE (ML) INJECTION EVERY 12 HOURS SCHEDULED
Status: DISCONTINUED | OUTPATIENT
Start: 2019-05-02 | End: 2019-05-02 | Stop reason: HOSPADM

## 2019-05-02 RX ORDER — METHYLPREDNISOLONE 32 MG/1
32 TABLET ORAL DAILY
Status: ON HOLD | COMMUNITY
End: 2019-07-29 | Stop reason: HOSPADM

## 2019-05-02 RX ORDER — ACETAMINOPHEN 500 MG
500 TABLET ORAL EVERY 6 HOURS PRN
COMMUNITY

## 2019-05-02 RX ORDER — LIDOCAINE HYDROCHLORIDE 20 MG/ML
INJECTION, SOLUTION EPIDURAL; INFILTRATION; INTRACAUDAL; PERINEURAL PRN
Status: DISCONTINUED | OUTPATIENT
Start: 2019-05-02 | End: 2019-05-02 | Stop reason: SDUPTHER

## 2019-05-02 RX ORDER — FLUOCINONIDE 0.5 MG/G
OINTMENT TOPICAL PRN
COMMUNITY
End: 2019-01-01

## 2019-05-02 RX ORDER — WATER / MINERAL OIL / WHITE PETROLATUM 16 OZ
CREAM TOPICAL PRN
COMMUNITY

## 2019-05-02 RX ORDER — TRIAMCINOLONE ACETONIDE 1 MG/ML
LOTION TOPICAL PRN
COMMUNITY

## 2019-05-02 RX ORDER — SODIUM CHLORIDE 9 MG/ML
INJECTION, SOLUTION INTRAVENOUS CONTINUOUS
Status: DISCONTINUED | OUTPATIENT
Start: 2019-05-02 | End: 2019-05-02 | Stop reason: HOSPADM

## 2019-05-02 RX ADMIN — SODIUM CHLORIDE: 9 INJECTION, SOLUTION INTRAVENOUS at 07:04

## 2019-05-02 RX ADMIN — PROPOFOL 20 MG: 10 INJECTION, EMULSION INTRAVENOUS at 07:15

## 2019-05-02 RX ADMIN — SODIUM CHLORIDE: 9 INJECTION, SOLUTION INTRAVENOUS at 06:16

## 2019-05-02 RX ADMIN — LIDOCAINE HYDROCHLORIDE 100 MG: 20 INJECTION, SOLUTION EPIDURAL; INFILTRATION; INTRACAUDAL; PERINEURAL at 07:06

## 2019-05-02 RX ADMIN — PROPOFOL 80 MG: 10 INJECTION, EMULSION INTRAVENOUS at 07:06

## 2019-05-02 RX ADMIN — PROPOFOL 20 MG: 10 INJECTION, EMULSION INTRAVENOUS at 07:11

## 2019-05-02 RX ADMIN — PROPOFOL 20 MG: 10 INJECTION, EMULSION INTRAVENOUS at 07:08

## 2019-05-02 ASSESSMENT — PAIN - FUNCTIONAL ASSESSMENT: PAIN_FUNCTIONAL_ASSESSMENT: 0-10

## 2019-05-02 ASSESSMENT — PAIN SCALES - GENERAL
PAINLEVEL_OUTOF10: 0

## 2019-05-02 ASSESSMENT — ENCOUNTER SYMPTOMS: SHORTNESS OF BREATH: 0

## 2019-05-02 NOTE — ANESTHESIA PRE PROCEDURE
Department of Anesthesiology  Preprocedure Note       Name:  Monica Bhat   Age:  69 y.o.  :  1940                                          MRN:  [id number]         Date:  2019      Surgeon: Jorge Casas):  Tanvir Rothman MD    Procedure: EGD (N/A Abdomen)    Medications prior to admission:   Prior to Admission medications    Medication Sig Start Date End Date Taking? Authorizing Provider   pembrolizumab (KEYTRUDA) 100 MG/4ML SOLN Infuse intravenously every 21 days   Yes Historical Provider, MD   acetaminophen (TYLENOL) 500 MG tablet Take 500 mg by mouth every 6 hours as needed for Pain   Yes Historical Provider, MD   Ca Carbonate-Mag Hydroxide (ANTACID EXTRA STRENGTH) 675-135 MG CHEW Take by mouth as needed   Yes Historical Provider, MD   Skin Protectants, Misc. (EUCERIN) cream Apply topically as needed for Dry Skin Apply topically as needed. Yes Historical Provider, MD   fluocinonide (LIDEX) 0.05 % ointment Apply topically as needed Apply topically 2 times daily.    Yes Historical Provider, MD   Misc Natural Products (GLUCOSAMINE CHONDROITIN MSM) TABS Take by mouth daily   Yes Historical Provider, MD   predniSONE (DELTASONE) 5 MG tablet Take 5 mg by mouth as needed   Yes Historical Provider, MD   omeprazole (PRILOSEC) 20 MG delayed release capsule Take 20 mg by mouth daily as needed   Yes Historical Provider, MD   spironolactone (ALDACTONE) 50 MG tablet TAKE ONE-HALF TABLET BY MOUTH TWICE A DAY 10/26/18  Yes Costa Cagle MD   pseudoephedrine (SUDAFED) 30 MG tablet Take 30 mg by mouth every 4 hours as needed for Congestion   Yes Historical Provider, MD   furosemide (LASIX) 20 MG tablet Take by mouth daily  17  Yes Historical Provider, MD   aspirin 325 MG tablet Take 1 tablet by mouth every 6 hours as needed Last dose 3/7/16 5/3/16  Yes Denise Mcgraw MD   methylPREDNISolone (MEDROL) 32 MG tablet Take 32 mg by mouth daily Indications: before CT    Historical Provider, MD   triamcinolone (KENALOG) 0.1 % lotion Apply topically as needed Apply topically 3 times daily. Historical Provider, MD   diphenhydrAMINE (BENADRYL) 25 MG capsule Take 25 mg by mouth as needed for Itching    Historical Provider, MD       Current medications:    Current Facility-Administered Medications   Medication Dose Route Frequency Provider Last Rate Last Dose    0.9 % sodium chloride infusion   Intravenous Continuous Doretha Onofre MD 75 mL/hr at 05/02/19 0616      sodium chloride flush 0.9 % injection 10 mL  10 mL Intravenous 2 times per day Doretha Onofre MD        sodium chloride flush 0.9 % injection 10 mL  10 mL Intravenous PRN Doretha Onofre MD           Allergies:     Allergies   Allergen Reactions    Erythromycin        Problem List:    Patient Active Problem List   Diagnosis Code    Essential hypertension, benign I10    Peptic ulcer K27.9    Shortness of breath R06.02    Lung mass R91.8    Hilar mass R91.8    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    Malignant melanoma of skin of left upper extremity, including shoulder (HCC) C43.62    Drug-induced fever R50.2    Rigors R68.89    Encounter for antineoplastic immunotherapy Z51.12    Mediastinal adenopathy R59.0    Malignant melanoma (HCC) C43.9       Past Medical History:        Diagnosis Date    Allergic     Arthritis     Hypertension     Lung mass     left    Melanoma ([address]     left back lyndsey geraldine; mets to lung    Obesity     PUD (peptic ulcer disease)     Skin cancer     Wheezing     since January       Past Surgical History:        Procedure Laterality Date    APPENDECTOMY      BRONCHOSCOPY  3/11/2016    biopsy    BRONCHOSCOPY N/A 06/08/2018    EYE SURGERY      cataracts with lens placement bilat    LUNG SURGERY Left     65% of left lung removed    TONSILLECTOMY         Social History:    Social History     Tobacco Use    Smoking status: Former Smoker     Packs/day: 0.25     Years: 55.00     Pack years: 13.75 Types: Cigars     Last attempt to quit: 1/20/2016     Years since quitting: 3.2    Smokeless tobacco: Never Used    Tobacco comment: cigars only   Substance Use Topics    Alcohol use: Not Currently                                Counseling given: Not Answered  Comment: cigars only      Vital Signs (Current):   Vitals:    05/02/19 0543   BP: 109/65   Pulse: 79   Resp: 16   Temp: 97.5 °F (36.4 °C)   TempSrc: Temporal   SpO2: 97%   Weight: 205 lb (93 kg)   Height: 5' 8\" (1.727 m)                                              BP Readings from Last 3 Encounters:   05/02/19 109/65   04/03/19 120/80   01/23/19 120/62       NPO Status: Time of last liquid consumption: 2200                        Time of last solid consumption: 1900                        Date of last liquid consumption: 05/01/19                        Date of last solid food consumption: 05/01/19    BMI:   Wt Readings from Last 3 Encounters:   05/02/19 205 lb (93 kg)   04/03/19 218 lb (98.9 kg)   01/23/19 237 lb (107.5 kg)     Body mass index is 31.17 kg/m². CBC:   Lab Results   Component Value Date    WBC 5.9 01/23/2019    RBC 4.94 01/23/2019    RBC 4.90 08/23/2017    HGB 14.8 01/23/2019    HCT 43.4 01/23/2019    MCV 88.0 01/23/2019    RDW 14.0 01/23/2019     01/23/2019       CMP:   Lab Results   Component Value Date     01/23/2019    K 4.3 01/23/2019     01/23/2019    CO2 21 01/23/2019    BUN 17 01/23/2019    CREATININE 1.1 01/23/2019    GFRAA >60 01/23/2019    GFRAA >60 06/10/2013    AGRATIO 2.0 01/23/2019    LABGLOM >60 01/23/2019    GLUCOSE 94 01/23/2019    GLUCOSE 87 08/23/2017    PROT 5.9 01/23/2019    PROT 6.0 08/23/2017    CALCIUM 9.6 01/23/2019    BILITOT 1.0 01/23/2019    ALKPHOS 94 01/23/2019    AST 15 01/23/2019    ALT 13 01/23/2019       POC Tests: No results for input(s): POCGLU, POCNA, POCK, POCCL, POCBUN, POCHEMO, POCHCT in the last 72 hours.     Coags:   Lab Results   Component Value Date    PROTIME 11.9 06/08/2018 INR 1.04 06/08/2018    APTT 32.0 06/08/2018       HCG (If Applicable): No results found for: PREGTESTUR, PREGSERUM, HCG, HCGQUANT     ABGs: No results found for: PHART, PO2ART, JCZ9FDZ, VFO7VTD, BEART, K7AMXOMU     Type & Screen (If Applicable):  No results found for: LABABO, [address]    Anesthesia Evaluation  Patient summary reviewed and Nursing notes reviewed no history of anesthetic complications:   Airway: Mallampati: IV  TM distance: >3 FB   Neck ROM: full  Mouth opening: < 3 FB Dental:          Pulmonary:       (-) asthma and shortness of breath                          ROS comment: Prior lung CA that was removed (melanoma) - has not had issues with SOB   Cardiovascular:  Exercise tolerance: poor (<4 METS),   (+) hypertension:,     (-)  angina          Echocardiogram reviewed                  Neuro/Psych:               GI/Hepatic/Renal:   (+) PUD,      (-) GERD       Endo/Other:    (+) malignancy/cancer. (-) diabetes mellitus               Abdominal:           Vascular:                                      Anesthesia Plan      MAC     ASA 3       Induction: intravenous. Anesthetic plan and risks discussed with patient. Plan discussed with CRNA.                   Mahnaz Hernandez MD   5/2/2019

## 2019-05-02 NOTE — ANESTHESIA POSTPROCEDURE EVALUATION
Department of Anesthesiology  Postprocedure Note    Patient: Dannielle Daniel  MRN: [id number]  YOB: 1940  Date of evaluation: 5/2/2019  Time:  10:00 AM     Procedure Summary     Date:  05/02/19 Room / Location:  [facility] ENDO 02 / [facility] ENDOSCOPY    Anesthesia Start:  0704 Anesthesia Stop:  4826    Procedure:  EGD BIOPSY (N/A Abdomen) Diagnosis:  (EARLY SATIETY R68.81)    Surgeon:  Anand Washington MD Responsible Provider:  Brenda Morrissey MD    Anesthesia Type:  MAC ASA Status:  3          Anesthesia Type: MAC    Vasquez Phase I: Vasquez Score: 10    Vasquez Phase II: Vasquez Score: 10    Last vitals: Reviewed and per EMR flowsheets.        Anesthesia Post Evaluation    Patient location during evaluation: PACU  Patient participation: complete - patient participated  Level of consciousness: awake and alert  Airway patency: patent  Nausea & Vomiting: no nausea and no vomiting  Complications: no  Cardiovascular status: hemodynamically stable  Respiratory status: acceptable  Hydration status: stable

## 2019-05-17 ENCOUNTER — OFFICE VISIT (OUTPATIENT)
Dept: INTERNAL MEDICINE CLINIC | Age: 79
End: 2019-05-17
Payer: MEDICARE

## 2019-05-17 VITALS
HEART RATE: 72 BPM | SYSTOLIC BLOOD PRESSURE: 100 MMHG | BODY MASS INDEX: 31.47 KG/M2 | WEIGHT: 207 LBS | DIASTOLIC BLOOD PRESSURE: 60 MMHG

## 2019-05-17 DIAGNOSIS — C43.8 MALIGNANT MELANOMA OF OVERLAPPING SITES (HCC): Primary | ICD-10-CM

## 2019-05-17 DIAGNOSIS — I10 BENIGN ESSENTIAL HYPERTENSION: ICD-10-CM

## 2019-05-17 DIAGNOSIS — R10.84 GENERALIZED ABDOMINAL PAIN: ICD-10-CM

## 2019-05-17 PROCEDURE — G8427 DOCREV CUR MEDS BY ELIG CLIN: HCPCS | Performed by: INTERNAL MEDICINE

## 2019-05-17 PROCEDURE — 1123F ACP DISCUSS/DSCN MKR DOCD: CPT | Performed by: INTERNAL MEDICINE

## 2019-05-17 PROCEDURE — 1036F TOBACCO NON-USER: CPT | Performed by: INTERNAL MEDICINE

## 2019-05-17 PROCEDURE — 99214 OFFICE O/P EST MOD 30 MIN: CPT | Performed by: INTERNAL MEDICINE

## 2019-05-17 PROCEDURE — 4040F PNEUMOC VAC/ADMIN/RCVD: CPT | Performed by: INTERNAL MEDICINE

## 2019-05-17 PROCEDURE — G8417 CALC BMI ABV UP PARAM F/U: HCPCS | Performed by: INTERNAL MEDICINE

## 2019-05-17 RX ORDER — TIZANIDINE 4 MG/1
4 TABLET ORAL EVERY 8 HOURS PRN
Qty: 30 TABLET | Refills: 3 | Status: SHIPPED | OUTPATIENT
Start: 2019-05-17 | End: 2019-09-10 | Stop reason: ALTCHOICE

## 2019-05-17 NOTE — PROGRESS NOTES
Chief Complaint   Patient presents with    Hypertension       Jeannie Otoole 67 y.o. male is here for follow-up of hypertension and melanoma   Early satiety and weight loss. He continues under the regular care of an oncologist    We referred him because of the early satiety for a EGD. Review of the pathology shows no evidence of a gastric carcinoma. Endoscopy looks basically unremarkable. He is taking omeprazole 40 mg orally twice a day at the instruction of the gastroenterologist    He gets discomfort when he swallows water but states when he drinks coffee he can do so without any symptoms whatsoever. He has not had hematemesis, melena, hematochezia or coffee-ground emesis    He complains of reduction in muscular tone, generalized fatigue, he tires easily. .. He had been on spironolactone because of chronic refractory edema. He discontinued this medication when she felt dehydrated. Current Outpatient Medications on File Prior to Visit   Medication Sig Dispense Refill    pembrolizumab (KEYTRUDA) 100 MG/4ML SOLN Infuse intravenously every 21 days      Misc Natural Products (GLUCOSAMINE CHONDROITIN MSM) TABS Take by mouth daily      omeprazole (PRILOSEC) 20 MG delayed release capsule Take 40 mg by mouth daily as needed       furosemide (LASIX) 20 MG tablet Take by mouth daily       methylPREDNISolone (MEDROL) 32 MG tablet Take 32 mg by mouth daily Indications: before CT      acetaminophen (TYLENOL) 500 MG tablet Take 500 mg by mouth every 6 hours as needed for Pain      Ca Carbonate-Mag Hydroxide (ANTACID EXTRA STRENGTH) 675-135 MG CHEW Take by mouth as needed      Skin Protectants, Misc. (EUCERIN) cream Apply topically as needed for Dry Skin Apply topically as needed.  fluocinonide (LIDEX) 0.05 % ointment Apply topically as needed Apply topically 2 times daily.       predniSONE (DELTASONE) 5 MG tablet Take 5 mg by mouth as needed      triamcinolone (KENALOG) 0.1 % lotion Apply topically as needed Apply topically 3 times daily.  spironolactone (ALDACTONE) 50 MG tablet TAKE ONE-HALF TABLET BY MOUTH TWICE A DAY 90 tablet 3    diphenhydrAMINE (BENADRYL) 25 MG capsule Take 25 mg by mouth as needed for Itching      pseudoephedrine (SUDAFED) 30 MG tablet Take 30 mg by mouth every 4 hours as needed for Congestion      aspirin 325 MG tablet Take 1 tablet by mouth every 6 hours as needed Last dose 3/7/16       No current facility-administered medications on file prior to visit.         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    Obesity     PUD (peptic ulcer disease)     Skin cancer     Wheezing     since January           /60   Pulse 72   Wt 207 lb (93.9 kg)   BMI 31.47 kg/m²     General Appearance:  He appears chronically but not acutely ill   Head:  Normocephalic, without obvious abnormality, atraumatic   Neck: Supple, symmetrical, trachea midline, no adenopathy, thyroid: not enlarged, symmetric, no tenderness/mass/nodules,     Lungs:   Clear to auscultation bilaterally, respirations unlabored   Chest Wall:  No tenderness or deformity   Heart:  Regular rate and rhythm, S1, S2 normal, no murmur, rub or gallop, he does have 2+ edema to the midcalf bilaterally    Abdomen:   Soft, non-tender, bowel sounds active all four quadrants,  no masses, no organomegaly genitourinary no hernias, no testicular masses    Skin:  Area of previous surgical excision shows some mild surrounding erythema but no purulent exudate,    Lymph nodes: Cervical, supraclavicular  Adenopathy is absent   No components found for: CHLPL  Lab Results   Component Value Date    TRIG 94 04/20/2018    TRIG 101 09/30/2016    TRIG 95 09/23/2015     Lab Results   Component Value Date    HDL 43 04/20/2018    HDL 49 09/30/2016    HDL 56 09/23/2015     Lab Results   Component Value Date    LDLCALC 129 (H) 04/20/2018    LDLCALC 120 (H) 09/30/2016    LDLCALC 136 (H) 09/23/2015     Lab Results   Component Value Date    LABVLDL 19 04/20/2018    LABVLDL 20 09/30/2016    LABVLDL 19 09/23/2015     Lab Results   Component Value Date    CREATININE 1.1 01/23/2019         ASSESSMENT/PLAN[de-identified]        Diagnosis Orders   1. Malignant melanoma of overlapping sites ([facility] [address] [facility]     2. Generalized abdominal pain     3. Benign essential hypertension        Workup for secondary gastrointestinal malignancy was negative    Has a history of gastric ulcers on high-dose omeprazole. I advised him I did not know why he had symptoms when swallowing water but not coffee, could be a variant of esophageal spasm although he tells me the water he drinks his temperature. Recommended nutritional supplements for his weight loss    I discussed his general sense of fatigue, lack of stamina and muscular tone. 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 I think the etiology of his weight loss and generalized fatigue and malaise is related to his underlying malignancy and treatment thereof.

## 2019-06-17 ENCOUNTER — OFFICE VISIT (OUTPATIENT)
Dept: PULMONOLOGY | Age: 79
End: 2019-06-17
Payer: COMMERCIAL

## 2019-06-17 VITALS — SYSTOLIC BLOOD PRESSURE: 116 MMHG | HEART RATE: 69 BPM | DIASTOLIC BLOOD PRESSURE: 71 MMHG | OXYGEN SATURATION: 99 %

## 2019-06-17 DIAGNOSIS — C43.9 MALIGNANT MELANOMA, UNSPECIFIED SITE (HCC): ICD-10-CM

## 2019-06-17 DIAGNOSIS — R91.8 LUNG MASS: Primary | ICD-10-CM

## 2019-06-17 DIAGNOSIS — R59.0 MEDIASTINAL ADENOPATHY: ICD-10-CM

## 2019-06-17 PROCEDURE — 99213 OFFICE O/P EST LOW 20 MIN: CPT | Performed by: INTERNAL MEDICINE

## 2019-06-18 ENCOUNTER — TELEPHONE (OUTPATIENT)
Dept: PULMONOLOGY | Age: 79
End: 2019-06-18

## 2019-06-18 NOTE — TELEPHONE ENCOUNTER
Pt called in wanting to know if we received the scans from [facility] overnight or if he needed to drop the disc off.     Pt # [phone]

## 2019-06-21 ENCOUNTER — ANESTHESIA EVENT (OUTPATIENT)
Dept: ENDOSCOPY | Age: 79
End: 2019-06-21
Payer: MEDICARE

## 2019-07-09 ENCOUNTER — ANESTHESIA (OUTPATIENT)
Dept: ENDOSCOPY | Age: 79
End: 2019-07-09
Payer: MEDICARE

## 2019-07-09 ENCOUNTER — HOSPITAL ENCOUNTER (OUTPATIENT)
Age: 79
Setting detail: OUTPATIENT SURGERY
Discharge: HOME | End: 2019-07-09
Attending: INTERNAL MEDICINE | Admitting: INTERNAL MEDICINE
Payer: MEDICARE

## 2019-07-09 VITALS
RESPIRATION RATE: 18 BRPM | HEART RATE: 86 BPM | DIASTOLIC BLOOD PRESSURE: 54 MMHG | HEIGHT: 68 IN | SYSTOLIC BLOOD PRESSURE: 104 MMHG | WEIGHT: 185 LBS | TEMPERATURE: 97.4 F | OXYGEN SATURATION: 97 % | BODY MASS INDEX: 28.04 KG/M2

## 2019-07-09 VITALS — SYSTOLIC BLOOD PRESSURE: 103 MMHG | DIASTOLIC BLOOD PRESSURE: 60 MMHG | OXYGEN SATURATION: 97 %

## 2019-07-09 PROCEDURE — 88305 TISSUE EXAM BY PATHOLOGIST: CPT

## 2019-07-09 PROCEDURE — 88341 IMHCHEM/IMCYTCHM EA ADD ANTB: CPT

## 2019-07-09 PROCEDURE — 3700000001 HC ADD 15 MINUTES (ANESTHESIA): Performed by: INTERNAL MEDICINE

## 2019-07-09 PROCEDURE — 2500000003 HC RX 250 WO HCPCS: Performed by: NURSE ANESTHETIST, CERTIFIED REGISTERED

## 2019-07-09 PROCEDURE — 7100000011 HC PHASE II RECOVERY - ADDTL 15 MIN: Performed by: INTERNAL MEDICINE

## 2019-07-09 PROCEDURE — 2709999900 HC NON-CHARGEABLE SUPPLY: Performed by: INTERNAL MEDICINE

## 2019-07-09 PROCEDURE — 3609012400 HC EGD TRANSORAL BIOPSY SINGLE/MULTIPLE: Performed by: INTERNAL MEDICINE

## 2019-07-09 PROCEDURE — 7100000010 HC PHASE II RECOVERY - FIRST 15 MIN: Performed by: INTERNAL MEDICINE

## 2019-07-09 PROCEDURE — 6360000002 HC RX W HCPCS: Performed by: NURSE ANESTHETIST, CERTIFIED REGISTERED

## 2019-07-09 PROCEDURE — 88342 IMHCHEM/IMCYTCHM 1ST ANTB: CPT

## 2019-07-09 PROCEDURE — 3700000000 HC ANESTHESIA ATTENDED CARE: Performed by: INTERNAL MEDICINE

## 2019-07-09 PROCEDURE — 2580000003 HC RX 258: Performed by: ANESTHESIOLOGY

## 2019-07-09 RX ORDER — PROPOFOL 10 MG/ML
INJECTION, EMULSION INTRAVENOUS PRN
Status: DISCONTINUED | OUTPATIENT
Start: 2019-07-09 | End: 2019-07-09 | Stop reason: SDUPTHER

## 2019-07-09 RX ORDER — SODIUM CHLORIDE 0.9 % (FLUSH) 0.9 %
10 SYRINGE (ML) INJECTION PRN
Status: DISCONTINUED | OUTPATIENT
Start: 2019-07-09 | End: 2019-07-09 | Stop reason: HOSPADM

## 2019-07-09 RX ORDER — SODIUM CHLORIDE 0.9 % (FLUSH) 0.9 %
10 SYRINGE (ML) INJECTION EVERY 12 HOURS SCHEDULED
Status: DISCONTINUED | OUTPATIENT
Start: 2019-07-09 | End: 2019-07-09 | Stop reason: HOSPADM

## 2019-07-09 RX ORDER — SODIUM CHLORIDE 9 MG/ML
INJECTION, SOLUTION INTRAVENOUS CONTINUOUS
Status: DISCONTINUED | OUTPATIENT
Start: 2019-07-09 | End: 2019-07-09 | Stop reason: HOSPADM

## 2019-07-09 RX ORDER — GLYCOPYRROLATE 0.2 MG/ML
INJECTION INTRAMUSCULAR; INTRAVENOUS PRN
Status: DISCONTINUED | OUTPATIENT
Start: 2019-07-09 | End: 2019-07-09 | Stop reason: SDUPTHER

## 2019-07-09 RX ORDER — LIDOCAINE HYDROCHLORIDE 20 MG/ML
INJECTION, SOLUTION EPIDURAL; INFILTRATION; INTRACAUDAL; PERINEURAL PRN
Status: DISCONTINUED | OUTPATIENT
Start: 2019-07-09 | End: 2019-07-09 | Stop reason: SDUPTHER

## 2019-07-09 RX ADMIN — GLYCOPYRROLATE 0.2 MG: 0.2 INJECTION, SOLUTION INTRAMUSCULAR; INTRAVENOUS at 07:02

## 2019-07-09 RX ADMIN — LIDOCAINE HYDROCHLORIDE 40 MG: 20 INJECTION, SOLUTION EPIDURAL; INFILTRATION; INTRACAUDAL; PERINEURAL at 07:10

## 2019-07-09 RX ADMIN — PROPOFOL 20 MG: 10 INJECTION, EMULSION INTRAVENOUS at 07:18

## 2019-07-09 RX ADMIN — PROPOFOL 80 MG: 10 INJECTION, EMULSION INTRAVENOUS at 07:10

## 2019-07-09 RX ADMIN — PROPOFOL 30 MG: 10 INJECTION, EMULSION INTRAVENOUS at 07:21

## 2019-07-09 RX ADMIN — SODIUM CHLORIDE: 9 INJECTION, SOLUTION INTRAVENOUS at 06:11

## 2019-07-09 RX ADMIN — PROPOFOL 30 MG: 10 INJECTION, EMULSION INTRAVENOUS at 07:15

## 2019-07-09 ASSESSMENT — PAIN SCALES - GENERAL
PAINLEVEL_OUTOF10: 0

## 2019-07-09 ASSESSMENT — PAIN - FUNCTIONAL ASSESSMENT: PAIN_FUNCTIONAL_ASSESSMENT: 0-10

## 2019-07-09 ASSESSMENT — ENCOUNTER SYMPTOMS: SHORTNESS OF BREATH: 0

## 2019-07-09 NOTE — ANESTHESIA PRE PROCEDURE
50 MG tablet TAKE ONE-HALF TABLET BY MOUTH TWICE A DAY  Patient taking differently: TAKE ONE-HALF TABLET BY MOUTH TWICE A DAY PRN 10/26/18   Renetta Felton MD   diphenhydrAMINE (BENADRYL) 25 MG capsule Take 25 mg by mouth as needed for Itching    Historical Provider, MD   pseudoephedrine (SUDAFED) 30 MG tablet Take 30 mg by mouth every 4 hours as needed for Congestion    Historical Provider, MD   furosemide (LASIX) 20 MG tablet Take by mouth daily as needed  9/20/17   Historical Provider, MD   aspirin 325 MG tablet Take 325 mg by mouth every 6 hours as needed (currently not taking) Last dose 3/7/16 5/3/16   Larry Flores MD       Current medications:    No current facility-administered medications for this visit. No current outpatient medications on file. Facility-Administered Medications Ordered in Other Visits   Medication Dose Route Frequency Provider Last Rate Last Dose    0.9 % sodium chloride infusion   Intravenous Continuous Samara Douglas  mL/hr at 07/09/19 0611      sodium chloride flush 0.9 % injection 10 mL  10 mL Intravenous 2 times per day Samara Douglas MD        sodium chloride flush 0.9 % injection 10 mL  10 mL Intravenous PRN Samara Douglas MD           Allergies:     Allergies   Allergen Reactions    Erythromycin        Problem List:    Patient Active Problem List   Diagnosis Code    Essential hypertension, benign I10    Peptic ulcer K27.9    Shortness of breath R06.02    Lung mass R91.8    Hilar mass R91.8    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

## 2019-07-22 ENCOUNTER — APPOINTMENT (OUTPATIENT)
Dept: GENERAL RADIOLOGY | Age: 79
DRG: 205 | End: 2019-07-22
Attending: INTERNAL MEDICINE
Payer: COMMERCIAL

## 2019-07-22 ENCOUNTER — HOSPITAL ENCOUNTER (INPATIENT)
Age: 79
LOS: 7 days | Discharge: HOME | DRG: 205 | End: 2019-07-29
Attending: INTERNAL MEDICINE | Admitting: INTERNAL MEDICINE
Payer: COMMERCIAL

## 2019-07-22 PROBLEM — J18.9 PNEUMONIA: Status: ACTIVE | Noted: 2019-07-22

## 2019-07-22 LAB
A/G RATIO: 1 (ref 1.1–2.2)
ALBUMIN SERPL-MCNC: 2.2 G/DL (ref 3.4–5)
ALP BLD-CCNC: 142 U/L (ref 40–129)
ALT SERPL-CCNC: 17 U/L (ref 10–40)
ANION GAP SERPL CALCULATED.3IONS-SCNC: 9 MMOL/L (ref 3–16)
AST SERPL-CCNC: 37 U/L (ref 15–37)
BASOPHILS ABSOLUTE: 0.1 K/UL (ref 0–0.2)
BASOPHILS RELATIVE PERCENT: 0.9 %
BILIRUB SERPL-MCNC: 1.5 MG/DL (ref 0–1)
BUN BLDV-MCNC: 20 MG/DL (ref 7–20)
CALCIUM SERPL-MCNC: 7.8 MG/DL (ref 8.3–10.6)
CHLORIDE BLD-SCNC: 102 MMOL/L (ref 99–110)
CO2: 24 MMOL/L (ref 21–32)
CREAT SERPL-MCNC: 1.2 MG/DL (ref 0.8–1.3)
EOSINOPHILS ABSOLUTE: 2 K/UL (ref 0–0.6)
EOSINOPHILS RELATIVE PERCENT: 17.2 %
GFR AFRICAN AMERICAN: >60
GFR NON-AFRICAN AMERICAN: 59
GLOBULIN: 2.3 G/DL
GLUCOSE BLD-MCNC: 79 MG/DL (ref 70–99)
HCT VFR BLD CALC: 34.6 % (ref 40.5–52.5)
HEMOGLOBIN: 11.6 G/DL (ref 13.5–17.5)
LYMPHOCYTES ABSOLUTE: 0.6 K/UL (ref 1–5.1)
LYMPHOCYTES RELATIVE PERCENT: 5.4 %
MAGNESIUM: 1.7 MG/DL (ref 1.8–2.4)
MCH RBC QN AUTO: 28.3 PG (ref 26–34)
MCHC RBC AUTO-ENTMCNC: 33.4 G/DL (ref 31–36)
MCV RBC AUTO: 84.8 FL (ref 80–100)
MONOCYTES ABSOLUTE: 0.7 K/UL (ref 0–1.3)
MONOCYTES RELATIVE PERCENT: 5.7 %
NEUTROPHILS ABSOLUTE: 8.4 K/UL (ref 1.7–7.7)
NEUTROPHILS RELATIVE PERCENT: 70.8 %
PDW BLD-RTO: 15.1 % (ref 12.4–15.4)
PLATELET # BLD: 333 K/UL (ref 135–450)
PMV BLD AUTO: 6.5 FL (ref 5–10.5)
POTASSIUM REFLEX MAGNESIUM: 3.5 MMOL/L (ref 3.5–5.1)
RBC # BLD: 4.08 M/UL (ref 4.2–5.9)
SODIUM BLD-SCNC: 135 MMOL/L (ref 136–145)
TOTAL PROTEIN: 4.5 G/DL (ref 6.4–8.2)
WBC # BLD: 11.9 K/UL (ref 4–11)

## 2019-07-22 PROCEDURE — 6360000002 HC RX W HCPCS: Performed by: NURSE PRACTITIONER

## 2019-07-22 PROCEDURE — 99254 IP/OBS CNSLTJ NEW/EST MOD 60: CPT | Performed by: INTERNAL MEDICINE

## 2019-07-22 PROCEDURE — 2580000003 HC RX 258: Performed by: INTERNAL MEDICINE

## 2019-07-22 PROCEDURE — 2700000000 HC OXYGEN THERAPY PER DAY

## 2019-07-22 PROCEDURE — 80053 COMPREHEN METABOLIC PANEL: CPT

## 2019-07-22 PROCEDURE — 83735 ASSAY OF MAGNESIUM: CPT

## 2019-07-22 PROCEDURE — 6370000000 HC RX 637 (ALT 250 FOR IP): Performed by: NURSE PRACTITIONER

## 2019-07-22 PROCEDURE — 2580000003 HC RX 258: Performed by: NURSE PRACTITIONER

## 2019-07-22 PROCEDURE — 71046 X-RAY EXAM CHEST 2 VIEWS: CPT

## 2019-07-22 PROCEDURE — 94761 N-INVAS EAR/PLS OXIMETRY MLT: CPT

## 2019-07-22 PROCEDURE — 6360000002 HC RX W HCPCS: Performed by: INTERNAL MEDICINE

## 2019-07-22 PROCEDURE — 85025 COMPLETE CBC W/AUTO DIFF WBC: CPT

## 2019-07-22 PROCEDURE — 1200000000 HC SEMI PRIVATE

## 2019-07-22 PROCEDURE — 36415 COLL VENOUS BLD VENIPUNCTURE: CPT

## 2019-07-22 PROCEDURE — 87040 BLOOD CULTURE FOR BACTERIA: CPT

## 2019-07-22 RX ORDER — ACETAMINOPHEN 500 MG
500 TABLET ORAL EVERY 6 HOURS PRN
Status: DISCONTINUED | OUTPATIENT
Start: 2019-07-22 | End: 2019-07-29 | Stop reason: HOSPADM

## 2019-07-22 RX ORDER — PREDNISONE 1 MG/1
5 TABLET ORAL DAILY PRN
Status: DISCONTINUED | OUTPATIENT
Start: 2019-07-22 | End: 2019-07-25

## 2019-07-22 RX ORDER — XYLITOL/YERBA SANTA
AEROSOL, SPRAY WITH PUMP (ML) MUCOUS MEMBRANE PRN
Status: DISCONTINUED | OUTPATIENT
Start: 2019-07-22 | End: 2019-07-29 | Stop reason: HOSPADM

## 2019-07-22 RX ORDER — MAGNESIUM SULFATE 1 G/100ML
1 INJECTION INTRAVENOUS PRN
Status: DISCONTINUED | OUTPATIENT
Start: 2019-07-22 | End: 2019-07-29 | Stop reason: HOSPADM

## 2019-07-22 RX ORDER — SODIUM CHLORIDE 0.9 % (FLUSH) 0.9 %
10 SYRINGE (ML) INJECTION EVERY 12 HOURS SCHEDULED
Status: DISCONTINUED | OUTPATIENT
Start: 2019-07-22 | End: 2019-07-29 | Stop reason: HOSPADM

## 2019-07-22 RX ORDER — PANTOPRAZOLE SODIUM 40 MG/1
40 TABLET, DELAYED RELEASE ORAL 2 TIMES DAILY
COMMUNITY
End: 2020-01-01

## 2019-07-22 RX ORDER — SODIUM CHLORIDE 0.9 % (FLUSH) 0.9 %
10 SYRINGE (ML) INJECTION PRN
Status: DISCONTINUED | OUTPATIENT
Start: 2019-07-22 | End: 2019-07-29 | Stop reason: HOSPADM

## 2019-07-22 RX ORDER — LEVOFLOXACIN 500 MG/1
500 TABLET, FILM COATED ORAL DAILY
Status: ON HOLD | COMMUNITY
Start: 2019-07-17 | End: 2019-07-29 | Stop reason: HOSPADM

## 2019-07-22 RX ORDER — POTASSIUM CHLORIDE 7.45 MG/ML
10 INJECTION INTRAVENOUS PRN
Status: DISCONTINUED | OUTPATIENT
Start: 2019-07-22 | End: 2019-07-29 | Stop reason: HOSPADM

## 2019-07-22 RX ORDER — TIZANIDINE 4 MG/1
4 TABLET ORAL EVERY 8 HOURS PRN
Status: DISCONTINUED | OUTPATIENT
Start: 2019-07-22 | End: 2019-07-29 | Stop reason: HOSPADM

## 2019-07-22 RX ORDER — PANTOPRAZOLE SODIUM 40 MG/1
40 TABLET, DELAYED RELEASE ORAL 2 TIMES DAILY
Status: DISCONTINUED | OUTPATIENT
Start: 2019-07-22 | End: 2019-07-29 | Stop reason: HOSPADM

## 2019-07-22 RX ORDER — SODIUM CHLORIDE 9 MG/ML
INJECTION, SOLUTION INTRAVENOUS CONTINUOUS
Status: DISCONTINUED | OUTPATIENT
Start: 2019-07-22 | End: 2019-07-24

## 2019-07-22 RX ORDER — ONDANSETRON 2 MG/ML
4 INJECTION INTRAMUSCULAR; INTRAVENOUS EVERY 6 HOURS PRN
Status: DISCONTINUED | OUTPATIENT
Start: 2019-07-22 | End: 2019-07-29 | Stop reason: HOSPADM

## 2019-07-22 RX ORDER — DIPHENHYDRAMINE HCL 25 MG
25 TABLET ORAL PRN
Status: DISCONTINUED | OUTPATIENT
Start: 2019-07-22 | End: 2019-07-29 | Stop reason: HOSPADM

## 2019-07-22 RX ADMIN — VANCOMYCIN HYDROCHLORIDE 1250 MG: 10 INJECTION, POWDER, LYOPHILIZED, FOR SOLUTION INTRAVENOUS at 21:57

## 2019-07-22 RX ADMIN — SODIUM CHLORIDE: 9 INJECTION, SOLUTION INTRAVENOUS at 16:04

## 2019-07-22 RX ADMIN — DEXTROSE MONOHYDRATE 500 MG: 50 INJECTION, SOLUTION INTRAVENOUS at 16:05

## 2019-07-22 RX ADMIN — CEFEPIME HYDROCHLORIDE 2 G: 2 INJECTION, POWDER, FOR SOLUTION INTRAVENOUS at 20:58

## 2019-07-22 RX ADMIN — PANTOPRAZOLE SODIUM 40 MG: 40 TABLET, DELAYED RELEASE ORAL at 17:23

## 2019-07-22 RX ADMIN — Medication 1 G: at 16:05

## 2019-07-22 ASSESSMENT — PAIN SCALES - GENERAL
PAINLEVEL_OUTOF10: 0

## 2019-07-22 NOTE — PROGRESS NOTES
4 Eyes Skin Assessment     The patient is being assess for  Admission    I agree that 2 RN's have performed a thorough Head to Toe Skin Assessment on the patient. ALL assessment sites listed below have been assessed. Areas assessed by both nurses: Veleta Tara  [x]   Head, Face, and Ears   [x]   Shoulders, Back, and Chest  [x]   Arms, Elbows, and Hands   [x]   Coccyx, Sacrum, and IschIum  [x]   Legs, Feet, and Heels        Does the Patient have Skin Breakdown?   No         Douglas Prevention initiated:  No   Wound Care Orders initiated:  No      [facility] nurse consulted for Pressure Injury (Stage 3,4, Unstageable, DTI, NWPT, and Complex wounds), New and Established Ostomies:  No      Nurse 1 eSignature: Electronically signed by Nicholas Friedman RN on 7/22/19 at 5:04 PM    **SHARE this note so that the co-signing nurse is able to place an eSignature**    Nurse 2 eSignature: Electronically signed by Tiffanie Maxwell RN on 7/22/19 at 7:22 PM

## 2019-07-22 NOTE — H&P
[facility]   HISTORY AND PHYSICAL NOTE      7/22/2019 1:56 PM    Patient Information:  Shay Grier is a 67 y.o. male [id number]  PCP:  Kandice Severino MD (Tel: [phone] )    Primary Oncologist: Eze Paul MD    Chief complaint:  Shortness of breath    History of Present Illness:  Michael Parr is a 67 y.o. male patient of Eze Paul MD who is treated for metastatic melanoma. Receiving treatment with Opdivo + Yervoy, last dose 6/26/19. Last week he developed a fever. He had a cough with thick purulent sputum at that time. He received Rocephin IV 2 G in office x1. Blood cultures were negative. Sputum culture with many Oropharyngeal kevin present. CXR revealed RLL pneumonia. He was switched to oral levaquin at that time. Fevers initially resolved but returned over the weekend. He is admitted for treatment of pneumonia. REVIEW OF SYSTEMS:     Per HPI; otherwise 10 point ROS is negative     Past Medical History:   has a past medical history of Allergic, Arthritis, Hypertension, Lung mass, Melanoma ([facility] [address] Obesity, PUD (peptic ulcer disease), Skin cancer, and Wheezing. Past Surgical History:   has a past surgical history that includes Appendectomy; Tonsillectomy; eye surgery; bronchoscopy (3/11/2016); bronchoscopy (N/A, 06/08/2018); Lung surgery (Left); Upper gastrointestinal endoscopy (N/A, 5/2/2019); and Upper gastrointestinal endoscopy (N/A, 7/9/2019). Medications:  No current facility-administered medications on file prior to encounter. Current Outpatient Medications on File Prior to Encounter   Medication Sig Dispense Refill    pantoprazole (PROTONIX) 40 MG tablet Take 40 mg by mouth 2 times daily      levofloxacin (LEVAQUIN) 500 MG tablet Take 500 mg by mouth daily Patient has one day left of 7 day course, last dose due 7/23/19.       Nivolumab (OPDIVO IV) Infuse intravenously every 21 days Indications: plus Yervoy      acetaminophen (TYLENOL) 500 MG tablet

## 2019-07-22 NOTE — CONSULTS
[facility] Pulmonary and Critical Care   Consult Note      Reason for Consult: Right lower lobe pneumonia  Requesting Physician: Court Marroquin    Subjective:   CHIEF COMPLAINT: Shortness of breath, cough and fevers     HPI: Patient is not a very good historian, most information was obtained from the chart. Has 1 week history of cough with purulent phlegm, shortness of breath with activity and high-grade fever-treated with oral Levaquin as an outpatient which initially improved but fevers have returned. Was admitted by Dr. Rob Dao for management of pneumonia. Pulmonary consultation has been requested. Patient in fact denies any of the above symptoms and states that he has been doing fine except for the fevers. History of metastatic melanoma with lung mets-currently on Opdivo+ Yervoy. Patient also follows with Dr. Jamil Hernandes. Former smoker, no history of COPD-not on inhalers.        The patient is a 67 y.o. male with significant past medical history of:      Diagnosis Date    Allergic     Arthritis     Hypertension     Lung mass     left    Melanoma ([address]     left back lyndsey geraldine; mets to lung    Obesity     PUD (peptic ulcer disease)     Skin cancer     Wheezing     since January        Past Surgical History:        Procedure Laterality Date    APPENDECTOMY      BRONCHOSCOPY  3/11/2016    biopsy    BRONCHOSCOPY N/A 06/08/2018    EYE SURGERY      cataracts with lens placement bilat    LUNG SURGERY Left     65% of left lung tumor removed    TONSILLECTOMY      UPPER GASTROINTESTINAL ENDOSCOPY N/A 5/2/2019    EGD BIOPSY performed by Jose A Hutchins MD at [address] [place] [address] N/A 7/9/2019    EGD BIOPSY performed by Jose A Hutchins MD at [address] [place] [address]     Current Medications:    Current Facility-Administered Medications: acetaminophen (TYLENOL) tablet 500 mg, 500 mg, Oral, Q6H PRN  diphenhydrAMINE (BENADRYL) tablet 25 mg, 25 mg, Oral, PRN  pantoprazole reflux symptoms and vomiting  Hematologic/lymphatic: negative for bleeding, easy bruising, lymphadenopathy and petechiae  Musculoskeletal:negative for arthralgias, bone pain, muscle weakness, neck pain and stiff joints  Neurological: negative for dizziness, gait problems, headaches, seizures, speech problems, tremors and weakness  Behavioral/Psych: negative for anxiety, behavior problems, depression, fatigue and sleep disturbance  Endocrine: negative for diabetic symptoms including none, neuropathy, polyphagia, polyuria, polydipsia, vomiting and diarrhea and temperature intolerance  Allergic/Immunologic: negative for anaphylaxis, angioedema, hay fever and urticaria      Objective:     Patient Vitals for the past 8 hrs:   BP Temp Temp src Pulse Resp SpO2 Height Weight   07/22/19 1725 96/60 98.2 °F (36.8 °C) Oral 86 18 92 % -- --   07/22/19 1230 -- -- -- -- -- 93 % -- --   07/22/19 1226 (!) 90/57 98.1 °F (36.7 °C) Oral 91 18 90 % 5' 8\" (1.727 m) 178 lb 8 oz (81 kg)     No intake/output data recorded. No intake/output data recorded.     Physical Exam:  General Appearance: alert and oriented to person, place and time, well developed and well- nourished, in no acute distress  Skin: warm and dry, no rash or erythema  Head: normocephalic and atraumatic  Eyes: pupils equal, round, and reactive to light, extraocular eye movements intact, conjunctivae normal  ENT: external ear and ear canal normal bilaterally, nose without deformity, nasal mucosa and turbinates normal  Neck: supple and non-tender without mass, no cervical lymphadenopathy  Pulmonary/Chest: rales present-right base  Cardiovascular: normal rate, regular rhythm,  no murmurs, rubs, distal pulses intact, no carotid bruits  Abdomen: soft, non-tender, non-distended, normal bowel sounds, no masses or organomegaly  Lymph Nodes: Cervical, supraclavicular normal  Extremities: no cyanosis, clubbing or edema  Musculoskeletal: normal range of motion, no joint swelling,

## 2019-07-23 LAB
ANION GAP SERPL CALCULATED.3IONS-SCNC: 9 MMOL/L (ref 3–16)
BUN BLDV-MCNC: 18 MG/DL (ref 7–20)
CALCIUM SERPL-MCNC: 7.6 MG/DL (ref 8.3–10.6)
CHLORIDE BLD-SCNC: 101 MMOL/L (ref 99–110)
CO2: 23 MMOL/L (ref 21–32)
CREAT SERPL-MCNC: 1.1 MG/DL (ref 0.8–1.3)
GFR AFRICAN AMERICAN: >60
GFR NON-AFRICAN AMERICAN: >60
GLUCOSE BLD-MCNC: 78 MG/DL (ref 70–99)
MAGNESIUM: 1.8 MG/DL (ref 1.8–2.4)
POTASSIUM REFLEX MAGNESIUM: 3.5 MMOL/L (ref 3.5–5.1)
SODIUM BLD-SCNC: 133 MMOL/L (ref 136–145)

## 2019-07-23 PROCEDURE — 83735 ASSAY OF MAGNESIUM: CPT

## 2019-07-23 PROCEDURE — 2580000003 HC RX 258: Performed by: NURSE PRACTITIONER

## 2019-07-23 PROCEDURE — 36415 COLL VENOUS BLD VENIPUNCTURE: CPT

## 2019-07-23 PROCEDURE — 6370000000 HC RX 637 (ALT 250 FOR IP): Performed by: NURSE PRACTITIONER

## 2019-07-23 PROCEDURE — 2580000003 HC RX 258: Performed by: INTERNAL MEDICINE

## 2019-07-23 PROCEDURE — 99232 SBSQ HOSP IP/OBS MODERATE 35: CPT | Performed by: INTERNAL MEDICINE

## 2019-07-23 PROCEDURE — 87070 CULTURE OTHR SPECIMN AEROBIC: CPT

## 2019-07-23 PROCEDURE — 6360000002 HC RX W HCPCS: Performed by: INTERNAL MEDICINE

## 2019-07-23 PROCEDURE — 94760 N-INVAS EAR/PLS OXIMETRY 1: CPT

## 2019-07-23 PROCEDURE — 1200000000 HC SEMI PRIVATE

## 2019-07-23 PROCEDURE — 2700000000 HC OXYGEN THERAPY PER DAY

## 2019-07-23 PROCEDURE — 87205 SMEAR GRAM STAIN: CPT

## 2019-07-23 PROCEDURE — 80048 BASIC METABOLIC PNL TOTAL CA: CPT

## 2019-07-23 RX ADMIN — SODIUM CHLORIDE: 9 INJECTION, SOLUTION INTRAVENOUS at 05:36

## 2019-07-23 RX ADMIN — Medication 10 ML: at 11:45

## 2019-07-23 RX ADMIN — VANCOMYCIN HYDROCHLORIDE 1250 MG: 10 INJECTION, POWDER, LYOPHILIZED, FOR SOLUTION INTRAVENOUS at 21:17

## 2019-07-23 RX ADMIN — PANTOPRAZOLE SODIUM 40 MG: 40 TABLET, DELAYED RELEASE ORAL at 05:26

## 2019-07-23 RX ADMIN — CEFEPIME HYDROCHLORIDE 2 G: 2 INJECTION, POWDER, FOR SOLUTION INTRAVENOUS at 11:43

## 2019-07-23 RX ADMIN — SODIUM CHLORIDE: 9 INJECTION, SOLUTION INTRAVENOUS at 17:49

## 2019-07-23 RX ADMIN — CEFEPIME HYDROCHLORIDE 2 G: 2 INJECTION, POWDER, FOR SOLUTION INTRAVENOUS at 22:37

## 2019-07-23 RX ADMIN — PANTOPRAZOLE SODIUM 40 MG: 40 TABLET, DELAYED RELEASE ORAL at 16:23

## 2019-07-23 ASSESSMENT — PAIN SCALES - GENERAL
PAINLEVEL_OUTOF10: 0
PAINLEVEL_OUTOF10: 1
PAINLEVEL_OUTOF10: 0

## 2019-07-23 NOTE — PROGRESS NOTES
[facility] Pulmonary/CCM Progress note      Admit Date: 7/22/2019    Chief Complaint: Cough, shortness of breath and fever    Subjective: Interval History: Low-grade fever of 99.4, no spikes noted. Patient states that he feels much better today-denies any significant cough or phlegm. No chest pain.     Scheduled Meds:   pantoprazole  40 mg Oral BID    sodium chloride flush  10 mL Intravenous 2 times per day    enoxaparin  40 mg Subcutaneous Daily    cefepime  2 g Intravenous Q12H    vancomycin  15 mg/kg Intravenous Q24H     Continuous Infusions:   sodium chloride 75 mL/hr at 07/23/19 5179     PRN Meds:acetaminophen, diphenhydrAMINE, tiZANidine, sodium chloride flush, magnesium hydroxide, ondansetron, magnesium sulfate, potassium chloride, mouth kote, predniSONE    Review of Systems  Constitutional: negative for fatigue, fevers, malaise and weight loss  Ears, nose, mouth, throat: negative for ear drainage, epistaxis, hoarseness, nasal congestion, sore throat and voice change  Respiratory: negative except for shortness of breath  Cardiovascular: negative for chest pain, chest pressure/discomfort, irregular heart beat, lower extremity edema and palpitations  Gastrointestinal: negative for abdominal pain, constipation, diarrhea, jaundice, melena, odynophagia, reflux symptoms and vomiting  Hematologic/lymphatic: negative for bleeding, easy bruising, lymphadenopathy and petechiae  Musculoskeletal:negative for arthralgias, bone pain, muscle weakness, neck pain and stiff joints  Neurological: negative for dizziness, gait problems, headaches, seizures, speech problems, tremors and weakness  Behavioral/Psych: negative for anxiety, behavior problems, depression, fatigue and sleep disturbance  Endocrine: negative for diabetic symptoms including none, neuropathy, polyphagia, polyuria, polydipsia, vomiting and diarrhea and temperature intolerance  Allergic/Immunologic: negative for anaphylaxis, angioedema, hay fever and

## 2019-07-23 NOTE — PROGRESS NOTES
tenderness, not distended  Musculoskeletal: No cyanosis in digits, neck supple  Neurology: CN 2-12 grossly intact. No speech or motor deficits  Psych: Normal affect. Alert and oriented in time, place and person  Skin: Warm, dry, normal turgor    Labs and Tests:  CBC:   Recent Labs     07/22/19  1324   WBC 11.9*   HGB 11.6*        BMP:  Recent Labs     07/22/19  1324 07/23/19  0524   * 133*   K 3.5 3.5    101   CO2 24 23   BUN 20 18   CREATININE 1.2 1.1   GLUCOSE 79 78     Hepatic: Recent Labs     07/22/19  1324   AST 37   ALT 17   BILITOT 1.5*   ALKPHOS 142*       ASSESSMENT AND PLAN    Active Problems:    Pneumonia  Resolved Problems:    * No resolved hospital problems. *      Melanoma with metastasis to the lung  - S/p 1 cycle Opdivo + Yervoy 6/26/19.        Community acquired pneumonia  - Persistent fever on Levaquin.  - Pulmonology consulted, ordered cefepime and vancomycin  - Blood cultures 7/16/19 negative.   - Sputum cultures 7/16/19 with mixed kevin.        Peptic ulcer disease  - Protonix 40 mg BID.  - Had EGD 7/9/19.        Hypotension  - Continuous IV fluids    Yuliana Grier CNP  Onco    Patient afebrile overnight Will continue iv antibiotics

## 2019-07-24 LAB
BASOPHILS ABSOLUTE: 0.1 K/UL (ref 0–0.2)
BASOPHILS RELATIVE PERCENT: 1.2 %
EOSINOPHILS ABSOLUTE: 1.3 K/UL (ref 0–0.6)
EOSINOPHILS RELATIVE PERCENT: 11.8 %
HCT VFR BLD CALC: 34.6 % (ref 40.5–52.5)
HEMOGLOBIN: 11.7 G/DL (ref 13.5–17.5)
LYMPHOCYTES ABSOLUTE: 0.8 K/UL (ref 1–5.1)
LYMPHOCYTES RELATIVE PERCENT: 7.3 %
MCH RBC QN AUTO: 28.4 PG (ref 26–34)
MCHC RBC AUTO-ENTMCNC: 33.7 G/DL (ref 31–36)
MCV RBC AUTO: 84.1 FL (ref 80–100)
MONOCYTES ABSOLUTE: 1 K/UL (ref 0–1.3)
MONOCYTES RELATIVE PERCENT: 9 %
NEUTROPHILS ABSOLUTE: 7.8 K/UL (ref 1.7–7.7)
NEUTROPHILS RELATIVE PERCENT: 70.7 %
PDW BLD-RTO: 15 % (ref 12.4–15.4)
PLATELET # BLD: 359 K/UL (ref 135–450)
PMV BLD AUTO: 6.7 FL (ref 5–10.5)
RBC # BLD: 4.11 M/UL (ref 4.2–5.9)
WBC # BLD: 11.1 K/UL (ref 4–11)

## 2019-07-24 PROCEDURE — 6370000000 HC RX 637 (ALT 250 FOR IP): Performed by: NURSE PRACTITIONER

## 2019-07-24 PROCEDURE — 2580000003 HC RX 258: Performed by: NURSE PRACTITIONER

## 2019-07-24 PROCEDURE — 85025 COMPLETE CBC W/AUTO DIFF WBC: CPT

## 2019-07-24 PROCEDURE — 94761 N-INVAS EAR/PLS OXIMETRY MLT: CPT

## 2019-07-24 PROCEDURE — 2580000003 HC RX 258: Performed by: INTERNAL MEDICINE

## 2019-07-24 PROCEDURE — 1200000000 HC SEMI PRIVATE

## 2019-07-24 PROCEDURE — 94760 N-INVAS EAR/PLS OXIMETRY 1: CPT

## 2019-07-24 PROCEDURE — 36415 COLL VENOUS BLD VENIPUNCTURE: CPT

## 2019-07-24 PROCEDURE — 6360000002 HC RX W HCPCS: Performed by: INTERNAL MEDICINE

## 2019-07-24 PROCEDURE — 2700000000 HC OXYGEN THERAPY PER DAY

## 2019-07-24 PROCEDURE — 99232 SBSQ HOSP IP/OBS MODERATE 35: CPT | Performed by: INTERNAL MEDICINE

## 2019-07-24 RX ORDER — SODIUM CHLORIDE 9 MG/ML
INJECTION, SOLUTION INTRAVENOUS CONTINUOUS
Status: DISCONTINUED | OUTPATIENT
Start: 2019-07-24 | End: 2019-07-27

## 2019-07-24 RX ADMIN — PANTOPRAZOLE SODIUM 40 MG: 40 TABLET, DELAYED RELEASE ORAL at 18:15

## 2019-07-24 RX ADMIN — CEFEPIME HYDROCHLORIDE 2 G: 2 INJECTION, POWDER, FOR SOLUTION INTRAVENOUS at 23:35

## 2019-07-24 RX ADMIN — ACETAMINOPHEN 500 MG: 500 TABLET, FILM COATED ORAL at 23:42

## 2019-07-24 RX ADMIN — CEFEPIME HYDROCHLORIDE 2 G: 2 INJECTION, POWDER, FOR SOLUTION INTRAVENOUS at 09:37

## 2019-07-24 RX ADMIN — Medication 10 ML: at 23:39

## 2019-07-24 RX ADMIN — Medication 10 ML: at 11:05

## 2019-07-24 RX ADMIN — PANTOPRAZOLE SODIUM 40 MG: 40 TABLET, DELAYED RELEASE ORAL at 05:28

## 2019-07-24 RX ADMIN — SODIUM CHLORIDE: 9 INJECTION, SOLUTION INTRAVENOUS at 16:12

## 2019-07-24 ASSESSMENT — PAIN SCALES - GENERAL
PAINLEVEL_OUTOF10: 0
PAINLEVEL_OUTOF10: 2
PAINLEVEL_OUTOF10: 0

## 2019-07-24 ASSESSMENT — PAIN - FUNCTIONAL ASSESSMENT: PAIN_FUNCTIONAL_ASSESSMENT: ACTIVITIES ARE NOT PREVENTED

## 2019-07-24 ASSESSMENT — PAIN DESCRIPTION - ONSET: ONSET: PROGRESSIVE

## 2019-07-24 ASSESSMENT — PAIN DESCRIPTION - ORIENTATION: ORIENTATION: LEFT;UPPER

## 2019-07-24 ASSESSMENT — PAIN DESCRIPTION - PROGRESSION: CLINICAL_PROGRESSION: GRADUALLY WORSENING

## 2019-07-24 ASSESSMENT — PAIN DESCRIPTION - PAIN TYPE: TYPE: ACUTE PAIN

## 2019-07-24 ASSESSMENT — PAIN DESCRIPTION - DESCRIPTORS: DESCRIPTORS: ACHING;SORE

## 2019-07-24 ASSESSMENT — PAIN DESCRIPTION - FREQUENCY: FREQUENCY: INTERMITTENT

## 2019-07-24 ASSESSMENT — PAIN DESCRIPTION - LOCATION: LOCATION: CHEST

## 2019-07-24 NOTE — PROGRESS NOTES
Patient given Incentive Spirometer, Patient demonstrates and verbalizes use of incentive spirometer and will use q 1 hour while awake. Spouse at bedside. No additional needs or concerns at this time. Will continue to monitor.

## 2019-07-24 NOTE — PROGRESS NOTES
not distended  Musculoskeletal: No cyanosis in digits, neck supple  Neurology: CN 2-12 grossly intact. No speech or motor deficits  Psych: Normal affect. Alert and oriented in time, place and person  Skin: Warm, dry, normal turgor    Labs and Tests:  CBC:   Recent Labs     07/22/19  1324 07/24/19  0518   WBC 11.9* 11.1*   HGB 11.6* 11.7*    359     BMP:  Recent Labs     07/22/19  1324 07/23/19  0524   * 133*   K 3.5 3.5    101   CO2 24 23   BUN 20 18   CREATININE 1.2 1.1   GLUCOSE 79 78     Hepatic: Recent Labs     07/22/19  1324   AST 37   ALT 17   BILITOT 1.5*   ALKPHOS 142*       ASSESSMENT AND PLAN    Active Problems:    Pneumonia  Resolved Problems:    * No resolved hospital problems. *      Melanoma with metastasis to the lung  - S/p 1 cycle Opdivo + Yervoy 6/26/19.        Community acquired pneumonia  - Persistent fever on Levaquin.  - Pulmonology consulted, ordered cefepime and vancomycin.  - Blood cultures 7/22/19 negative.   - Sputum cultures 7/22/19 with mixed kevin, final report pending.        Peptic ulcer disease  - Protonix 40 mg BID.  - Had EGD 7/9/19.        Hypotension  - Improved  - Stop IV fluids, encourage PO intake        Jean Marie Andrew.  [phone]    Attestation:    Seen and examined personally with CNP   Agree with above A & P     Addendum     A/P:    Pneumonia  On IV ABX  Improving    Appreciated help from pulmonary    ABX per Pulmonry    Wean O2 down,ISE Q1 hrs 5-10 times while awake    May DC in 1-2 days as soon as weaned off O2    Ensure for Nutrition,may consider appetite stimulant    Dr. Yohannes Pennington primary    Inocencia Miranda MD   Hem-Oncology/ [facility] and [address] [place] [address],[address] office    O: [phone]  Toll free: [phone]

## 2019-07-24 NOTE — PROGRESS NOTES
as a part of this visit. Narrative   EXAMINATION:   TWO XRAY VIEWS OF THE CHEST       7/22/2019 3:02 pm       COMPARISON:   Prior outside chest CT, most recent 03/09/2018       HISTORY:   ORDERING SYSTEM PROVIDED HISTORY: Pneumonia   TECHNOLOGIST PROVIDED HISTORY:   Reason for exam:->Pneumonia   Acuity: Unknown   Type of Exam: Unknown       FINDINGS:   The heart is upper normal size.  There is patchy airspace disease   bilaterally, with asymmetric distribution right parahilar and both lower   lobes, greater on the right. Marine Leer is relative sparing of the mid to upper   left lung.  Blunting of the costophrenic angle is seen bilaterally and   posteriorly representing small pleural effusion.           Impression   Bilateral lower lobe and right parahilar airspace disease with pleural   effusion.  This could represent multifocal pneumonia.  However asymmetric   congestive failure superimposed upon COPD can have a similar appearance.       RECOMMENDATION:   Follow-up is recommended preferably with upright PA and lateral views. Problem List:     Healthcare associated pneumonia  Metastatic melanoma with lung mets    Assessment/Plan:     Clinically better, however blood pressure somewhat low. Check chest x-ray tomorrow. Continue with current antibiotic therapy with cefepime and vancomycin. WBC 11. ?IV fluids for hypotension.     Would need 1 more day in hospital at least.    Brigette Molina MD

## 2019-07-24 NOTE — PROGRESS NOTES
Sent message to Dr. Clark Amin regarding low bp. Typically not as low as it has been the last two readings.

## 2019-07-25 ENCOUNTER — APPOINTMENT (OUTPATIENT)
Dept: GENERAL RADIOLOGY | Age: 79
DRG: 205 | End: 2019-07-25
Attending: INTERNAL MEDICINE
Payer: COMMERCIAL

## 2019-07-25 ENCOUNTER — APPOINTMENT (OUTPATIENT)
Dept: CT IMAGING | Age: 79
DRG: 205 | End: 2019-07-25
Attending: INTERNAL MEDICINE
Payer: COMMERCIAL

## 2019-07-25 LAB
BASOPHILS ABSOLUTE: 0.1 K/UL (ref 0–0.2)
BASOPHILS RELATIVE PERCENT: 1.2 %
CULTURE, RESPIRATORY: NORMAL
EOSINOPHILS ABSOLUTE: 1.5 K/UL (ref 0–0.6)
EOSINOPHILS RELATIVE PERCENT: 13.4 %
GRAM STAIN RESULT: NORMAL
HCT VFR BLD CALC: 34.4 % (ref 40.5–52.5)
HEMOGLOBIN: 11.5 G/DL (ref 13.5–17.5)
LYMPHOCYTES ABSOLUTE: 0.8 K/UL (ref 1–5.1)
LYMPHOCYTES RELATIVE PERCENT: 6.9 %
MCH RBC QN AUTO: 28.3 PG (ref 26–34)
MCHC RBC AUTO-ENTMCNC: 33.3 G/DL (ref 31–36)
MCV RBC AUTO: 84.9 FL (ref 80–100)
MONOCYTES ABSOLUTE: 1 K/UL (ref 0–1.3)
MONOCYTES RELATIVE PERCENT: 8.9 %
NEUTROPHILS ABSOLUTE: 7.8 K/UL (ref 1.7–7.7)
NEUTROPHILS RELATIVE PERCENT: 69.6 %
PDW BLD-RTO: 15.6 % (ref 12.4–15.4)
PLATELET # BLD: 326 K/UL (ref 135–450)
PMV BLD AUTO: 6.5 FL (ref 5–10.5)
RBC # BLD: 4.05 M/UL (ref 4.2–5.9)
VANCOMYCIN TROUGH: 9.3 UG/ML (ref 10–20)
WBC # BLD: 11.2 K/UL (ref 4–11)

## 2019-07-25 PROCEDURE — 2700000000 HC OXYGEN THERAPY PER DAY

## 2019-07-25 PROCEDURE — 6360000002 HC RX W HCPCS: Performed by: INTERNAL MEDICINE

## 2019-07-25 PROCEDURE — 2580000003 HC RX 258: Performed by: INTERNAL MEDICINE

## 2019-07-25 PROCEDURE — 6370000000 HC RX 637 (ALT 250 FOR IP): Performed by: NURSE PRACTITIONER

## 2019-07-25 PROCEDURE — 80202 ASSAY OF VANCOMYCIN: CPT

## 2019-07-25 PROCEDURE — 85025 COMPLETE CBC W/AUTO DIFF WBC: CPT

## 2019-07-25 PROCEDURE — 99233 SBSQ HOSP IP/OBS HIGH 50: CPT | Performed by: INTERNAL MEDICINE

## 2019-07-25 PROCEDURE — 94760 N-INVAS EAR/PLS OXIMETRY 1: CPT

## 2019-07-25 PROCEDURE — 71250 CT THORAX DX C-: CPT

## 2019-07-25 PROCEDURE — 71045 X-RAY EXAM CHEST 1 VIEW: CPT

## 2019-07-25 PROCEDURE — 1200000000 HC SEMI PRIVATE

## 2019-07-25 PROCEDURE — 36415 COLL VENOUS BLD VENIPUNCTURE: CPT

## 2019-07-25 RX ORDER — METHYLPREDNISOLONE SODIUM SUCCINATE 40 MG/ML
40 INJECTION, POWDER, LYOPHILIZED, FOR SOLUTION INTRAMUSCULAR; INTRAVENOUS EVERY 12 HOURS
Status: DISCONTINUED | OUTPATIENT
Start: 2019-07-25 | End: 2019-07-29

## 2019-07-25 RX ADMIN — METHYLPREDNISOLONE SODIUM SUCCINATE 40 MG: 40 INJECTION, POWDER, FOR SOLUTION INTRAMUSCULAR; INTRAVENOUS at 22:13

## 2019-07-25 RX ADMIN — SODIUM CHLORIDE: 9 INJECTION, SOLUTION INTRAVENOUS at 06:18

## 2019-07-25 RX ADMIN — PANTOPRAZOLE SODIUM 40 MG: 40 TABLET, DELAYED RELEASE ORAL at 06:18

## 2019-07-25 RX ADMIN — CEFEPIME HYDROCHLORIDE 2 G: 2 INJECTION, POWDER, FOR SOLUTION INTRAVENOUS at 11:12

## 2019-07-25 RX ADMIN — PANTOPRAZOLE SODIUM 40 MG: 40 TABLET, DELAYED RELEASE ORAL at 16:33

## 2019-07-25 RX ADMIN — VANCOMYCIN HYDROCHLORIDE 1250 MG: 10 INJECTION, POWDER, LYOPHILIZED, FOR SOLUTION INTRAVENOUS at 00:27

## 2019-07-25 ASSESSMENT — PAIN DESCRIPTION - LOCATION: LOCATION: CHEST;THROAT

## 2019-07-25 ASSESSMENT — PAIN DESCRIPTION - PROGRESSION: CLINICAL_PROGRESSION: NOT CHANGED

## 2019-07-25 ASSESSMENT — PAIN DESCRIPTION - ONSET: ONSET: PROGRESSIVE

## 2019-07-25 ASSESSMENT — PAIN DESCRIPTION - ORIENTATION: ORIENTATION: LEFT;UPPER;RIGHT

## 2019-07-25 ASSESSMENT — PAIN SCALES - GENERAL
PAINLEVEL_OUTOF10: 0
PAINLEVEL_OUTOF10: 2
PAINLEVEL_OUTOF10: 0

## 2019-07-25 ASSESSMENT — PAIN - FUNCTIONAL ASSESSMENT: PAIN_FUNCTIONAL_ASSESSMENT: ACTIVITIES ARE NOT PREVENTED

## 2019-07-25 ASSESSMENT — PAIN DESCRIPTION - PAIN TYPE: TYPE: ACUTE PAIN

## 2019-07-25 ASSESSMENT — PAIN DESCRIPTION - FREQUENCY: FREQUENCY: INTERMITTENT

## 2019-07-25 ASSESSMENT — PAIN DESCRIPTION - DESCRIPTORS: DESCRIPTORS: ACHING;SORE

## 2019-07-25 NOTE — CONSULTS
Gastroenterology Consult Note      Patient: Alexis Bowers  : 1940  Acct#:      Date:  2019    Subjective:       History of Present Illness  Patient is a 67 y.o.  male admitted with Pneumonia [J18.9] who is seen in consult for input on starting steroids for possible immune mediated pneumonitis in setting of PUD. H/o melanoma metastatic to the mediastinal lymph nodes now on Yervoy and opdivo. Dx with PUD in May 2019 by Dr Kristi Fletcher. Had been on ASA and steroids and Keytruda. Was on omeprazole BID. Repeat EGD 19 with persistent shallow antral ulcers felt to be from [place]. He has been on Protonix 40 mg BID. Zantac nightly prescribed but not taking. Prior N/V he had has resolved. He eats half of his meals then has fullness and stops eating. No abdominal pain. No melena or hematochezia. Now admitted with pneumonia. There is concern for possible immune mediated pneumonitis. He is on 3 L O2 (not on O2 usually). Coughing up phlegm. 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    Obesity     PUD (peptic ulcer disease)     Skin cancer     Wheezing     since January      Past Surgical History:   Procedure Laterality Date    APPENDECTOMY      BRONCHOSCOPY  3/11/2016    biopsy    BRONCHOSCOPY N/A 2018    EYE SURGERY      cataracts with lens placement bilat    LUNG SURGERY Left     65% of left lung tumor removed    TONSILLECTOMY      UPPER GASTROINTESTINAL ENDOSCOPY N/A 2019    EGD BIOPSY performed by Nai Smith MD at [address] [facility] N/A 2019    EGD BIOPSY performed by Nai Smith MD at [address] [place] [address]      Past Endoscopic History: see hpi    Admission Meds  No current facility-administered medications on file prior to encounter.       Current Outpatient Medications on File Prior to Encounter Review of Systems  Constitutional: negative for fevers, chills, sweats    Ears, nose, mouth, throat, and face: negative for nasal congestion and sore throat   Respiratory: negative for cough and shortness of breath   Cardiovascular: negative for chest pain and dyspnea   Gastrointestinal: see hpi   Genitourinary:negative for dysuria and frequency   Integument/breast: negative for pruritus and rash   Hematologic/lymphatic: negative for bleeding and easy bruising   Musculoskeletal:negative for arthralgias and myalgias   Neurological: negative for dizziness and weakness       Physical Exam  Blood pressure 116/68, pulse 89, temperature 100.4 °F (38 °C), temperature source Oral, resp. rate 18, height 5' 8\" (1.727 m), weight 178 lb 8 oz (81 kg), SpO2 93 %. General appearance: alert, cooperative, no distress, appears stated age  Eyes: Anicteric  Head: Normocephalic, without obvious abnormality  Lungs: diminished, Normal Effort  Heart: regular rate and rhythm, normal S1 and S2, no murmurs or rubs  Abdomen: soft, non-tender. Bowel sounds normal. No masses,  no organomegaly. Extremities: atraumatic, no cyanosis or edema  Skin: warm and dry, no jaundice  Neuro: Grossly intact, A&OX3  Musculoskeletal: 5/5  strength BUE      Data Review:    Recent Labs     07/22/19  1324 07/24/19  0518 07/25/19  0523   WBC 11.9* 11.1* 11.2*   HGB 11.6* 11.7* 11.5*   HCT 34.6* 34.6* 34.4*   MCV 84.8 84.1 84.9    359 326     Recent Labs     07/22/19  1324 07/23/19  0524   * 133*   K 3.5 3.5    101   CO2 24 23   BUN 20 18   CREATININE 1.2 1.1     Recent Labs     07/22/19  1324   AST 37   ALT 17   BILITOT 1.5*   ALKPHOS 142*     No results for input(s): LIPASE, AMYLASE in the last 72 hours. No results for input(s): PROTIME, INR in the last 72 hours. No results for input(s): PTT in the last 72 hours. No results for input(s): OCCULTBLD in the last 72 hours.         Assessment:     Active Problems: Pneumonia  Resolved Problems:    * No resolved hospital problems. *    H/o PUD - felt to possibly be from Bhumika Rendon. Has been off this medication. Compliant with BID PPI. No signs of GI bleeding. Metastatic melanoma  Pneumonia - also concern for immune mediated pneumonitis. Recommendations:   - continue BID PPI  - add qhs zantac as outpt (not available inpt). - No signs of GI bleeding. If steroids needed, ok from GI standpoint to start. Discussed with Dr. Jeffery Dudley, [address]    I have personally performed a face to face diagnostic evaluation on this patient. I have interviewed and examined the patient and I agree with the findings and recommended plan of care. In summary, my findings and plan are the following: admit possible immune mediated pnuemonitis for which steroids would be beneficial but has hx ulcers, abd soft. I am hopeful that ulcers improving off of Keytruda and on bid ppi and qhs zantac; currently not great po intake but able take some (at time of last egd couldn't eat), no sign gi bleeding; with larger acute issue being pulm status I am ok with trial steroid and follow clinically. I would not pursue repeat egd with current pulm status. Ok trial steroids; if develops any other GI symptoms please recall, otherwise I will see him back for f/u outpt egd to ensure ulcers improving after pulm issues resolved. Discussed with pt and family at bedside. Will sign off, call if needed.     Mary Alice Rainey MD  [address] and [address]

## 2019-07-25 NOTE — PROGRESS NOTES
6630 : Pt resting in bed, no s/s of distress. Shift handoff completed with Rebecca at bedside. 7980 : Pt sent to CT in wheelchair, no s/s of distress.

## 2019-07-25 NOTE — PROGRESS NOTES
101   CO2 24 23   BUN 20 18   CREATININE 1.2 1.1   CALCIUM 7.8* 7.6*   GLUCOSE 79 78      ABG:  No results for input(s): PHART, NXY4LGD, PO2ART, NDG6YHX, P6EOHIQM, BEART in the last 72 hours. Cultures:    Abx:    Radiology Review:  Pertinent images / reports were reviewed as a part of this visit. Assessment:     1. Acute hypoxemic respiratory failure  2. Healthcare acquired pneumonia  3. Diffuse pneumonitis possibly related to chemotherapy  4. Metastatic melanoma  5. Bilateral pleural effusions    I reviewed CT imaging which reveals evidence of diffuse groundglass opacity. This would be consistent pneumonitis related to his immunologic therapy. There are also some areas that appear more consolidated and could be infectious in etiology  IV Solu-Medrol would be reasonable  Question of safety has been posed to GI. We will wait to hear their opinion before starting Solu-Medrol.   Continue antibiotics  Cultures are negative

## 2019-07-25 NOTE — PROGRESS NOTES
Pt. Routine VSS - see flowsheets. Pt. Denies other needs at this time; pt shows no signs of discomfort or distress. Pt. Asked if feeling from before where pt had stated \"I just don't feel right\" had returned/worsened; pt stated \"I don't feel it right now. \" Call light/table in reach. Fall precautions in place. Will continue to monitor.

## 2019-07-26 LAB
BASOPHILS ABSOLUTE: 0 K/UL (ref 0–0.2)
BASOPHILS RELATIVE PERCENT: 0.3 %
EOSINOPHILS ABSOLUTE: 0 K/UL (ref 0–0.6)
EOSINOPHILS RELATIVE PERCENT: 0.1 %
HCT VFR BLD CALC: 35.5 % (ref 40.5–52.5)
HEMOGLOBIN: 12 G/DL (ref 13.5–17.5)
LYMPHOCYTES ABSOLUTE: 0.6 K/UL (ref 1–5.1)
LYMPHOCYTES RELATIVE PERCENT: 6.7 %
MCH RBC QN AUTO: 28.6 PG (ref 26–34)
MCHC RBC AUTO-ENTMCNC: 33.8 G/DL (ref 31–36)
MCV RBC AUTO: 84.7 FL (ref 80–100)
MONOCYTES ABSOLUTE: 0.1 K/UL (ref 0–1.3)
MONOCYTES RELATIVE PERCENT: 1.5 %
NEUTROPHILS ABSOLUTE: 7.6 K/UL (ref 1.7–7.7)
NEUTROPHILS RELATIVE PERCENT: 91.4 %
PDW BLD-RTO: 15.5 % (ref 12.4–15.4)
PLATELET # BLD: 347 K/UL (ref 135–450)
PMV BLD AUTO: 6.7 FL (ref 5–10.5)
RBC # BLD: 4.19 M/UL (ref 4.2–5.9)
WBC # BLD: 8.3 K/UL (ref 4–11)

## 2019-07-26 PROCEDURE — 6360000002 HC RX W HCPCS: Performed by: NURSE PRACTITIONER

## 2019-07-26 PROCEDURE — 94760 N-INVAS EAR/PLS OXIMETRY 1: CPT

## 2019-07-26 PROCEDURE — 2580000003 HC RX 258: Performed by: INTERNAL MEDICINE

## 2019-07-26 PROCEDURE — 85025 COMPLETE CBC W/AUTO DIFF WBC: CPT

## 2019-07-26 PROCEDURE — 6360000002 HC RX W HCPCS: Performed by: INTERNAL MEDICINE

## 2019-07-26 PROCEDURE — 2580000003 HC RX 258: Performed by: NURSE PRACTITIONER

## 2019-07-26 PROCEDURE — 36415 COLL VENOUS BLD VENIPUNCTURE: CPT

## 2019-07-26 PROCEDURE — 6370000000 HC RX 637 (ALT 250 FOR IP): Performed by: NURSE PRACTITIONER

## 2019-07-26 PROCEDURE — 1200000000 HC SEMI PRIVATE

## 2019-07-26 PROCEDURE — 99233 SBSQ HOSP IP/OBS HIGH 50: CPT | Performed by: INTERNAL MEDICINE

## 2019-07-26 PROCEDURE — 6370000000 HC RX 637 (ALT 250 FOR IP): Performed by: INTERNAL MEDICINE

## 2019-07-26 PROCEDURE — 2700000000 HC OXYGEN THERAPY PER DAY

## 2019-07-26 RX ORDER — FLUTICASONE PROPIONATE 50 MCG
1 SPRAY, SUSPENSION (ML) NASAL DAILY PRN
Status: DISCONTINUED | OUTPATIENT
Start: 2019-07-26 | End: 2019-07-29 | Stop reason: HOSPADM

## 2019-07-26 RX ORDER — FLUCONAZOLE 2 MG/ML
100 INJECTION, SOLUTION INTRAVENOUS EVERY 24 HOURS
Status: DISCONTINUED | OUTPATIENT
Start: 2019-07-26 | End: 2019-07-29 | Stop reason: HOSPADM

## 2019-07-26 RX ADMIN — METHYLPREDNISOLONE SODIUM SUCCINATE 40 MG: 40 INJECTION, POWDER, FOR SOLUTION INTRAMUSCULAR; INTRAVENOUS at 09:42

## 2019-07-26 RX ADMIN — LIDOCAINE HYDROCHLORIDE 5 ML: 20 SOLUTION ORAL; TOPICAL at 11:26

## 2019-07-26 RX ADMIN — VANCOMYCIN HYDROCHLORIDE 1250 MG: 10 INJECTION, POWDER, LYOPHILIZED, FOR SOLUTION INTRAVENOUS at 01:03

## 2019-07-26 RX ADMIN — SODIUM CHLORIDE: 9 INJECTION, SOLUTION INTRAVENOUS at 00:26

## 2019-07-26 RX ADMIN — CEFEPIME HYDROCHLORIDE 2 G: 2 INJECTION, POWDER, FOR SOLUTION INTRAVENOUS at 00:26

## 2019-07-26 RX ADMIN — METHYLPREDNISOLONE SODIUM SUCCINATE 40 MG: 40 INJECTION, POWDER, FOR SOLUTION INTRAMUSCULAR; INTRAVENOUS at 21:37

## 2019-07-26 RX ADMIN — Medication 10 ML: at 09:42

## 2019-07-26 RX ADMIN — LIDOCAINE HYDROCHLORIDE 5 ML: 20 SOLUTION ORAL; TOPICAL at 21:32

## 2019-07-26 RX ADMIN — PANTOPRAZOLE SODIUM 40 MG: 40 TABLET, DELAYED RELEASE ORAL at 05:49

## 2019-07-26 RX ADMIN — Medication 10 ML: at 21:34

## 2019-07-26 RX ADMIN — SODIUM CHLORIDE: 9 INJECTION, SOLUTION INTRAVENOUS at 09:42

## 2019-07-26 RX ADMIN — CEFEPIME HYDROCHLORIDE 2 G: 2 INJECTION, POWDER, FOR SOLUTION INTRAVENOUS at 11:26

## 2019-07-26 RX ADMIN — PANTOPRAZOLE SODIUM 40 MG: 40 TABLET, DELAYED RELEASE ORAL at 16:22

## 2019-07-26 RX ADMIN — FLUTICASONE PROPIONATE 1 SPRAY: 50 SPRAY, METERED NASAL at 16:22

## 2019-07-26 RX ADMIN — LIDOCAINE HYDROCHLORIDE 5 ML: 20 SOLUTION ORAL; TOPICAL at 16:22

## 2019-07-26 RX ADMIN — FLUCONAZOLE 100 MG: 200 INJECTION, SOLUTION INTRAVENOUS at 14:31

## 2019-07-26 ASSESSMENT — PAIN DESCRIPTION - PAIN TYPE: TYPE: ACUTE PAIN

## 2019-07-26 ASSESSMENT — PAIN SCALES - GENERAL
PAINLEVEL_OUTOF10: 0
PAINLEVEL_OUTOF10: 0

## 2019-07-26 ASSESSMENT — PAIN SCALES - WONG BAKER: WONGBAKER_NUMERICALRESPONSE: 0

## 2019-07-26 NOTE — PLAN OF CARE
Problem: Falls - Risk of:  Goal: Will remain free from falls  Description  Will remain free from falls  Outcome: Ongoing  Calls out appropriately, bed alarm active. Problem: Airway Clearance - Ineffective:  Goal: Clear lung sounds  Description  Clear lung sounds  Outcome: Ongoing     Problem: Gas Exchange - Impaired:  Goal: Levels of oxygenation will improve  Description  Levels of oxygenation will improve  Outcome: Ongoing  Currently on 4L.      Problem: Pain:  Goal: Pain level will decrease  Description  Pain level will decrease  Outcome: Ongoing  Denies

## 2019-07-26 NOTE — PROGRESS NOTES
Dr. Rivas Priilsa paged regarding multiple white, raised lesions found on patient's tongue. Patient states that they are painful and making it difficult for him to eat.

## 2019-07-26 NOTE — FLOWSHEET NOTE
Assessment complete. Hypoxic, oxygen increased by .5 in cycles-see flowsheet. Patient resting in bed. Respirations even and easy. Call light in reach. Will continue to monitor.         07/25/19 2205   Vital Signs   Temp 98.4 °F (36.9 °C)   Temp Source Oral   Pulse 95   Heart Rate Source Monitor   Resp 18   /67   BP Location Right upper arm   BP Upper/Lower Upper   MAP (mmHg) 80   Patient Position Semi fowlers   Level of Consciousness 0   MEWS Score 1   Patient Currently in Pain Denies   Pain Assessment   Pain Assessment 0-10   Pain Level 0   Patient's Stated Pain Goal No pain   Oxygen Therapy   SpO2 (!) 85 %   Pulse Oximeter Device Mode Intermittent   Pulse Oximeter Device Location Finger   O2 Device Nasal cannula   O2 Flow Rate (L/min) 3 L/min

## 2019-07-26 NOTE — FLOWSHEET NOTE
New oxygen levels after increasements of .5       07/25/19 9721   Oxygen Therapy   SpO2 92 %   O2 Flow Rate (L/min) 4 L/min

## 2019-07-26 NOTE — PROGRESS NOTES
Oncology and Hematology Care   Progress Note      7/26/2019 8:43 AM        Name: Henry Regalado . Admitted: 7/22/2019    SUBJECTIVE:  He looks better this morning, he continues to have a cough, but is producing less sputum. Blood pressure continues to run on low side. Patient frustrated by having to call for assistance to get out of bed. Reviewed interval ancillary notes    Current Medications    methylPREDNISolone sodium (SOLU-MEDROL) injection 40 mg Q12H   0.9 % sodium chloride infusion Continuous   acetaminophen (TYLENOL) tablet 500 mg Q6H PRN   diphenhydrAMINE (BENADRYL) tablet 25 mg PRN   pantoprazole (PROTONIX) tablet 40 mg BID   tiZANidine (ZANAFLEX) tablet 4 mg Q8H PRN   sodium chloride flush 0.9 % injection 10 mL 2 times per day   sodium chloride flush 0.9 % injection 10 mL PRN   magnesium hydroxide (MILK OF MAGNESIA) 400 MG/5ML suspension 30 mL Daily PRN   ondansetron (ZOFRAN) injection 4 mg Q6H PRN   enoxaparin (LOVENOX) injection 40 mg Daily   magnesium sulfate 1 g in dextrose 5% 100 mL IVPB PRN   potassium chloride 10 mEq/100 mL IVPB (Peripheral Line) PRN   mouth kote solution PRN   cefepime (MAXIPIME) 2 g IVPB minibag Q12H   vancomycin (VANCOCIN) 1,250 mg in dextrose 5 % 250 mL IVPB Q24H       Objective:  /65   Pulse 80   Temp 97.3 °F (36.3 °C) (Axillary)   Resp 18   Ht 5' 8\" (1.727 m)   Wt 178 lb 8 oz (81 kg)   SpO2 91%   BMI 27.14 kg/m²     Intake/Output Summary (Last 24 hours) at 7/26/2019 0843  Last data filed at 7/26/2019 0437  Gross per 24 hour   Intake 790 ml   Output 800 ml   Net -10 ml      Wt Readings from Last 3 Encounters:   07/22/19 178 lb 8 oz (81 kg)   07/09/19 185 lb (83.9 kg)   05/17/19 207 lb (93.9 kg)       General appearance:  Appears comfortable  Eyes: Sclera clear. Pupils equal.  ENT: Moist oral mucosa. Trachea midline, no adenopathy. Cardiovascular: Regular rhythm, normal S1, S2. No murmur.  1+ edema LE bilat  Respiratory: Not using accessory

## 2019-07-27 LAB
BASOPHILS ABSOLUTE: 0 K/UL (ref 0–0.2)
BASOPHILS RELATIVE PERCENT: 0.2 %
BLOOD CULTURE, ROUTINE: NORMAL
CULTURE, BLOOD 2: NORMAL
EOSINOPHILS ABSOLUTE: 0 K/UL (ref 0–0.6)
EOSINOPHILS RELATIVE PERCENT: 0 %
HCT VFR BLD CALC: 34.9 % (ref 40.5–52.5)
HEMOGLOBIN: 11.4 G/DL (ref 13.5–17.5)
LYMPHOCYTES ABSOLUTE: 1 K/UL (ref 1–5.1)
LYMPHOCYTES RELATIVE PERCENT: 9 %
MCH RBC QN AUTO: 28 PG (ref 26–34)
MCHC RBC AUTO-ENTMCNC: 32.7 G/DL (ref 31–36)
MCV RBC AUTO: 85.5 FL (ref 80–100)
MONOCYTES ABSOLUTE: 0.3 K/UL (ref 0–1.3)
MONOCYTES RELATIVE PERCENT: 2.8 %
NEUTROPHILS ABSOLUTE: 9.8 K/UL (ref 1.7–7.7)
NEUTROPHILS RELATIVE PERCENT: 88 %
PDW BLD-RTO: 15.9 % (ref 12.4–15.4)
PLATELET # BLD: 393 K/UL (ref 135–450)
PMV BLD AUTO: 7.2 FL (ref 5–10.5)
RBC # BLD: 4.08 M/UL (ref 4.2–5.9)
WBC # BLD: 11.1 K/UL (ref 4–11)

## 2019-07-27 PROCEDURE — 36415 COLL VENOUS BLD VENIPUNCTURE: CPT

## 2019-07-27 PROCEDURE — 2580000003 HC RX 258: Performed by: NURSE PRACTITIONER

## 2019-07-27 PROCEDURE — 6360000002 HC RX W HCPCS: Performed by: INTERNAL MEDICINE

## 2019-07-27 PROCEDURE — 94760 N-INVAS EAR/PLS OXIMETRY 1: CPT

## 2019-07-27 PROCEDURE — 85025 COMPLETE CBC W/AUTO DIFF WBC: CPT

## 2019-07-27 PROCEDURE — 2580000003 HC RX 258: Performed by: INTERNAL MEDICINE

## 2019-07-27 PROCEDURE — 6370000000 HC RX 637 (ALT 250 FOR IP): Performed by: INTERNAL MEDICINE

## 2019-07-27 PROCEDURE — 6370000000 HC RX 637 (ALT 250 FOR IP): Performed by: NURSE PRACTITIONER

## 2019-07-27 PROCEDURE — 1200000000 HC SEMI PRIVATE

## 2019-07-27 PROCEDURE — 99232 SBSQ HOSP IP/OBS MODERATE 35: CPT | Performed by: INTERNAL MEDICINE

## 2019-07-27 PROCEDURE — 2700000000 HC OXYGEN THERAPY PER DAY

## 2019-07-27 RX ORDER — FUROSEMIDE 20 MG/1
20 TABLET ORAL DAILY
Status: DISCONTINUED | OUTPATIENT
Start: 2019-07-27 | End: 2019-07-29 | Stop reason: HOSPADM

## 2019-07-27 RX ADMIN — LIDOCAINE HYDROCHLORIDE 5 ML: 20 SOLUTION ORAL; TOPICAL at 20:06

## 2019-07-27 RX ADMIN — FUROSEMIDE 20 MG: 20 TABLET ORAL at 17:25

## 2019-07-27 RX ADMIN — METHYLPREDNISOLONE SODIUM SUCCINATE 40 MG: 40 INJECTION, POWDER, FOR SOLUTION INTRAMUSCULAR; INTRAVENOUS at 08:30

## 2019-07-27 RX ADMIN — LIDOCAINE HYDROCHLORIDE 5 ML: 20 SOLUTION ORAL; TOPICAL at 16:37

## 2019-07-27 RX ADMIN — LIDOCAINE HYDROCHLORIDE 5 ML: 20 SOLUTION ORAL; TOPICAL at 14:21

## 2019-07-27 RX ADMIN — CEFEPIME HYDROCHLORIDE 2 G: 2 INJECTION, POWDER, FOR SOLUTION INTRAVENOUS at 16:37

## 2019-07-27 RX ADMIN — LIDOCAINE HYDROCHLORIDE 5 ML: 20 SOLUTION ORAL; TOPICAL at 10:04

## 2019-07-27 RX ADMIN — PANTOPRAZOLE SODIUM 40 MG: 40 TABLET, DELAYED RELEASE ORAL at 07:00

## 2019-07-27 RX ADMIN — CEFEPIME HYDROCHLORIDE 2 G: 2 INJECTION, POWDER, FOR SOLUTION INTRAVENOUS at 03:56

## 2019-07-27 RX ADMIN — PANTOPRAZOLE SODIUM 40 MG: 40 TABLET, DELAYED RELEASE ORAL at 16:37

## 2019-07-27 RX ADMIN — METHYLPREDNISOLONE SODIUM SUCCINATE 40 MG: 40 INJECTION, POWDER, FOR SOLUTION INTRAMUSCULAR; INTRAVENOUS at 20:06

## 2019-07-27 RX ADMIN — Medication 10 ML: at 20:06

## 2019-07-27 RX ADMIN — FLUCONAZOLE 100 MG: 200 INJECTION, SOLUTION INTRAVENOUS at 14:21

## 2019-07-27 RX ADMIN — VANCOMYCIN HYDROCHLORIDE 1250 MG: 10 INJECTION, POWDER, LYOPHILIZED, FOR SOLUTION INTRAVENOUS at 03:59

## 2019-07-27 ASSESSMENT — PAIN DESCRIPTION - PROGRESSION
CLINICAL_PROGRESSION: GRADUALLY IMPROVING
CLINICAL_PROGRESSION: GRADUALLY IMPROVING

## 2019-07-27 ASSESSMENT — PAIN DESCRIPTION - PAIN TYPE
TYPE: ACUTE PAIN
TYPE: ACUTE PAIN

## 2019-07-27 ASSESSMENT — PAIN DESCRIPTION - FREQUENCY: FREQUENCY: INTERMITTENT

## 2019-07-27 ASSESSMENT — PAIN SCALES - GENERAL
PAINLEVEL_OUTOF10: 0

## 2019-07-27 ASSESSMENT — PAIN - FUNCTIONAL ASSESSMENT: PAIN_FUNCTIONAL_ASSESSMENT: ACTIVITIES ARE NOT PREVENTED

## 2019-07-27 ASSESSMENT — PAIN SCALES - WONG BAKER
WONGBAKER_NUMERICALRESPONSE: 0

## 2019-07-27 ASSESSMENT — PAIN DESCRIPTION - LOCATION: LOCATION: CHEST;MOUTH

## 2019-07-27 ASSESSMENT — PAIN DESCRIPTION - ONSET: ONSET: PROGRESSIVE

## 2019-07-27 ASSESSMENT — PAIN DESCRIPTION - ORIENTATION: ORIENTATION: LEFT;UPPER

## 2019-07-27 ASSESSMENT — PAIN DESCRIPTION - DESCRIPTORS: DESCRIPTORS: ACHING;SORE

## 2019-07-27 NOTE — PROGRESS NOTES
The care plan and education has been reviewed and mutually agreed upon with the patient. Assessment complete and documented. A/O, VSS. Sitting up, assisted with breakfast tray. No complaints of pain. Refusing lovenox. Needs met. Call light in reach, bed alarm activated. Will monitor.

## 2019-07-27 NOTE — PROGRESS NOTES
assess for infiltrates Acuity: Unknown Type of Exam: Initial FINDINGS: Frontal view of the chest demonstrates no lines or tubes. Stable cardiomediastinal silhouette. Small bilateral effusions are not significantly changed. Diffuse airspace opacities have worsened since the prior study. No pneumothorax. No acute osseous abnormality. 1. No lines or tubes. 2. Interval worsening of bilateral airspace opacities, right greater than left. 3. Stable small bilateral effusions. ASSESSMENT AND PLAN    Active Problems:    Pneumonia  Resolved Problems:    * No resolved hospital problems. *      Melanoma with metastasis to the lung  - S/p 1 cycle Opdivo + Yervoy 6/26/19.     Community acquired pneumonia  - Persistent fever on Levaquin.  - Pulmonology consulted, ordered cefepime and vancomycin.  - Blood cultures 7/22/19 negative. - Sputum cultures 7/22/19 with mixed kevin, final report pending.  - CT chest showed infiltrates are worse, solumedrol 40 mg q 12 hours started 7/25/19 (cover potential that this is immune pneumonitis)     Peptic ulcer disease  - Protonix 40 mg BID.  - Had EGD 7/9/19.      Hypotension  - IV fluids continue, reduced to 75/hr. Labs stable        Odilia Huston. Jose Roberto Grace M.D., MPH  Co-Chair, Department of  Clinical [facility], [address] [place] [address]  Office [phone]  Cell [phone]  [email or web address]       \"Participating in a clinical trial is the first step to fighting cancer; not the last.\"    7/27/2019 7:57 AM

## 2019-07-27 NOTE — PROGRESS NOTES
[facility] Pulmonary/CCM Progress note      Admit Date: 7/22/2019    Chief Complaint: Cough, shortness of breath and fever    Subjective: Interval History: No fevers over the last 48 hours. Still has dyspnea with exertion and on 2 L O2. Cough, nonproductive. Appears weak.     Scheduled Meds:   fluconazole  100 mg Intravenous Q24H    magic (miracle) mouthwash with nystatin  5 mL Swish & Spit 4x Daily    methylPREDNISolone  40 mg Intravenous Q12H    pantoprazole  40 mg Oral BID    sodium chloride flush  10 mL Intravenous 2 times per day    enoxaparin  40 mg Subcutaneous Daily    cefepime  2 g Intravenous Q12H    vancomycin  15 mg/kg Intravenous Q24H     Continuous Infusions:   sodium chloride 75 mL/hr at 07/26/19 0942     PRN Meds:fluticasone, acetaminophen, diphenhydrAMINE, tiZANidine, sodium chloride flush, magnesium hydroxide, ondansetron, magnesium sulfate, potassium chloride, mouth kote    Review of Systems  Constitutional: negative for fatigue, fevers, malaise and weight loss  Ears, nose, mouth, throat: negative for ear drainage, epistaxis, hoarseness, nasal congestion, sore throat and voice change  Respiratory: negative except for shortness of breath  Cardiovascular: negative for chest pain, chest pressure/discomfort, irregular heart beat, lower extremity edema and palpitations  Gastrointestinal: negative for abdominal pain, constipation, diarrhea, jaundice, melena, odynophagia, reflux symptoms and vomiting  Hematologic/lymphatic: negative for bleeding, easy bruising, lymphadenopathy and petechiae  Musculoskeletal:negative for arthralgias, bone pain, muscle weakness, neck pain and stiff joints  Neurological: negative for dizziness, gait problems, headaches, seizures, speech problems, tremors and weakness  Behavioral/Psych: negative for anxiety, behavior problems, depression, fatigue and sleep disturbance  Endocrine: negative for diabetic symptoms including none, neuropathy, polyphagia, polyuria, polydipsia, vomiting and diarrhea and temperature intolerance  Allergic/Immunologic: negative for anaphylaxis, angioedema, hay fever and urticaria    Objective:     Patient Vitals for the past 8 hrs:   BP Temp src Pulse Resp SpO2   07/27/19 1212 -- -- -- 16 97 %   07/27/19 0823 (!) 112/57 Axillary 65 16 98 %     I/O last 3 completed shifts:   In: 240 [P.O.:240]  Out: 550 [Urine:550]  I/O this shift:  In: -   Out: 225 [Urine:225]    General Appearance: alert and oriented to person, place and time, well developed and well- nourished, in no acute distress  Skin: warm and dry, no rash or erythema  Head: normocephalic and atraumatic  Eyes: pupils equal, round, and reactive to light, extraocular eye movements intact, conjunctivae normal  ENT: external ear and ear canal normal bilaterally, nose without deformity, nasal mucosa and turbinates normal  Neck: supple and non-tender without mass, no cervical lymphadenopathy  Pulmonary/Chest: clear to auscultation bilaterally- no wheezes, rales or rhonchi, normal air movement, no respiratory distress  Cardiovascular: normal rate, regular rhythm,  no murmurs, rubs, distal pulses intact, no carotid bruits  Abdomen: soft, non-tender, non-distended, normal bowel sounds, no masses or organomegaly  Lymph Nodes: Cervical, supraclavicular normal  Extremities: no cyanosis, clubbing or edema  Musculoskeletal: normal range of motion, no joint swelling, deformity or tenderness  Neurologic: alert, no focal neurologic deficits    Data Review:  CBC:   Lab Results   Component Value Date    WBC 11.1 07/27/2019    RBC 4.08 07/27/2019    RBC 4.90 08/23/2017     BMP:   Lab Results   Component Value Date    GLUCOSE 78 07/23/2019    GLUCOSE 87 08/23/2017    CO2 23 07/23/2019    BUN 18 07/23/2019    CREATININE 1.1 07/23/2019    CALCIUM 7.6 07/23/2019     ABG: No results found for: HUJ7EMW, BEART, H7LIQCUZ, PHART, THGBART, DCU7LXX, PO2ART, VWE9UDY    Radiology: All pertinent images / reports were reviewed

## 2019-07-28 ENCOUNTER — APPOINTMENT (OUTPATIENT)
Dept: GENERAL RADIOLOGY | Age: 79
DRG: 205 | End: 2019-07-28
Attending: INTERNAL MEDICINE
Payer: COMMERCIAL

## 2019-07-28 LAB
BASOPHILS ABSOLUTE: 0 K/UL (ref 0–0.2)
BASOPHILS RELATIVE PERCENT: 0.1 %
EOSINOPHILS ABSOLUTE: 0 K/UL (ref 0–0.6)
EOSINOPHILS RELATIVE PERCENT: 0 %
HCT VFR BLD CALC: 31.9 % (ref 40.5–52.5)
HEMOGLOBIN: 10.7 G/DL (ref 13.5–17.5)
LYMPHOCYTES ABSOLUTE: 0.9 K/UL (ref 1–5.1)
LYMPHOCYTES RELATIVE PERCENT: 8 %
MCH RBC QN AUTO: 28.6 PG (ref 26–34)
MCHC RBC AUTO-ENTMCNC: 33.5 G/DL (ref 31–36)
MCV RBC AUTO: 85.4 FL (ref 80–100)
MONOCYTES ABSOLUTE: 0.3 K/UL (ref 0–1.3)
MONOCYTES RELATIVE PERCENT: 3.1 %
NEUTROPHILS ABSOLUTE: 9.6 K/UL (ref 1.7–7.7)
NEUTROPHILS RELATIVE PERCENT: 88.8 %
PDW BLD-RTO: 15.9 % (ref 12.4–15.4)
PLATELET # BLD: 363 K/UL (ref 135–450)
PMV BLD AUTO: 7.1 FL (ref 5–10.5)
RBC # BLD: 3.74 M/UL (ref 4.2–5.9)
WBC # BLD: 10.8 K/UL (ref 4–11)

## 2019-07-28 PROCEDURE — 6370000000 HC RX 637 (ALT 250 FOR IP): Performed by: NURSE PRACTITIONER

## 2019-07-28 PROCEDURE — 99232 SBSQ HOSP IP/OBS MODERATE 35: CPT | Performed by: INTERNAL MEDICINE

## 2019-07-28 PROCEDURE — 6360000002 HC RX W HCPCS: Performed by: INTERNAL MEDICINE

## 2019-07-28 PROCEDURE — 6370000000 HC RX 637 (ALT 250 FOR IP): Performed by: INTERNAL MEDICINE

## 2019-07-28 PROCEDURE — 1200000000 HC SEMI PRIVATE

## 2019-07-28 PROCEDURE — 2580000003 HC RX 258: Performed by: INTERNAL MEDICINE

## 2019-07-28 PROCEDURE — 2700000000 HC OXYGEN THERAPY PER DAY

## 2019-07-28 PROCEDURE — 2580000003 HC RX 258: Performed by: NURSE PRACTITIONER

## 2019-07-28 PROCEDURE — 85025 COMPLETE CBC W/AUTO DIFF WBC: CPT

## 2019-07-28 PROCEDURE — 2580000003 HC RX 258

## 2019-07-28 PROCEDURE — 94760 N-INVAS EAR/PLS OXIMETRY 1: CPT

## 2019-07-28 PROCEDURE — 36415 COLL VENOUS BLD VENIPUNCTURE: CPT

## 2019-07-28 RX ORDER — SODIUM CHLORIDE 9 MG/ML
INJECTION, SOLUTION INTRAVENOUS
Status: COMPLETED
Start: 2019-07-28 | End: 2019-07-28

## 2019-07-28 RX ADMIN — LIDOCAINE HYDROCHLORIDE 5 ML: 20 SOLUTION ORAL; TOPICAL at 09:25

## 2019-07-28 RX ADMIN — LIDOCAINE HYDROCHLORIDE 5 ML: 20 SOLUTION ORAL; TOPICAL at 20:56

## 2019-07-28 RX ADMIN — Medication 10 ML: at 20:55

## 2019-07-28 RX ADMIN — FLUCONAZOLE 100 MG: 200 INJECTION, SOLUTION INTRAVENOUS at 14:25

## 2019-07-28 RX ADMIN — VANCOMYCIN HYDROCHLORIDE 1250 MG: 10 INJECTION, POWDER, LYOPHILIZED, FOR SOLUTION INTRAVENOUS at 07:20

## 2019-07-28 RX ADMIN — Medication 10 ML: at 08:44

## 2019-07-28 RX ADMIN — CEFEPIME HYDROCHLORIDE 2 G: 2 INJECTION, POWDER, FOR SOLUTION INTRAVENOUS at 04:22

## 2019-07-28 RX ADMIN — METHYLPREDNISOLONE SODIUM SUCCINATE 40 MG: 40 INJECTION, POWDER, FOR SOLUTION INTRAMUSCULAR; INTRAVENOUS at 20:55

## 2019-07-28 RX ADMIN — SODIUM CHLORIDE 50 ML: 9 INJECTION, SOLUTION INTRAVENOUS at 13:57

## 2019-07-28 RX ADMIN — METHYLPREDNISOLONE SODIUM SUCCINATE 40 MG: 40 INJECTION, POWDER, FOR SOLUTION INTRAMUSCULAR; INTRAVENOUS at 08:44

## 2019-07-28 RX ADMIN — PANTOPRAZOLE SODIUM 40 MG: 40 TABLET, DELAYED RELEASE ORAL at 16:20

## 2019-07-28 RX ADMIN — PANTOPRAZOLE SODIUM 40 MG: 40 TABLET, DELAYED RELEASE ORAL at 05:32

## 2019-07-28 RX ADMIN — LIDOCAINE HYDROCHLORIDE 5 ML: 20 SOLUTION ORAL; TOPICAL at 13:56

## 2019-07-28 RX ADMIN — FUROSEMIDE 20 MG: 20 TABLET ORAL at 08:44

## 2019-07-28 RX ADMIN — CEFEPIME HYDROCHLORIDE 2 G: 2 INJECTION, POWDER, FOR SOLUTION INTRAVENOUS at 16:20

## 2019-07-28 ASSESSMENT — PAIN SCALES - GENERAL
PAINLEVEL_OUTOF10: 0

## 2019-07-28 ASSESSMENT — PAIN SCALES - WONG BAKER
WONGBAKER_NUMERICALRESPONSE: 0

## 2019-07-28 ASSESSMENT — PAIN DESCRIPTION - PROGRESSION
CLINICAL_PROGRESSION: GRADUALLY IMPROVING
CLINICAL_PROGRESSION: GRADUALLY IMPROVING

## 2019-07-28 ASSESSMENT — PAIN DESCRIPTION - PAIN TYPE: TYPE: ACUTE PAIN

## 2019-07-28 NOTE — PROGRESS NOTES
Oncology and Hematology Care   Progress Note      7/28/2019 7:39 AM        Name: Galina Jacobs . Admitted: 7/22/2019    SUBJECTIVE:  He cont with slow improvement. No new complaints. Cough decreased. Remains afebrile. He is dyspneic with exertion. He is weak. Still, hopes to go home in AM.    Reviewed interval ancillary notes    Current Medications    furosemide (LASIX) tablet 20 mg Daily   fluconazole (DIFLUCAN) in 0.9 % sodium chloride IVPB 100 mg Q24H   magic (miracle) mouthwash with nystatin 4x Daily   fluticasone (FLONASE) 50 MCG/ACT nasal spray 1 spray Daily PRN   methylPREDNISolone sodium (SOLU-MEDROL) injection 40 mg Q12H   acetaminophen (TYLENOL) tablet 500 mg Q6H PRN   diphenhydrAMINE (BENADRYL) tablet 25 mg PRN   pantoprazole (PROTONIX) tablet 40 mg BID   tiZANidine (ZANAFLEX) tablet 4 mg Q8H PRN   sodium chloride flush 0.9 % injection 10 mL 2 times per day   sodium chloride flush 0.9 % injection 10 mL PRN   magnesium hydroxide (MILK OF MAGNESIA) 400 MG/5ML suspension 30 mL Daily PRN   ondansetron (ZOFRAN) injection 4 mg Q6H PRN   enoxaparin (LOVENOX) injection 40 mg Daily   magnesium sulfate 1 g in dextrose 5% 100 mL IVPB PRN   potassium chloride 10 mEq/100 mL IVPB (Peripheral Line) PRN   mouth kote solution PRN   cefepime (MAXIPIME) 2 g IVPB minibag Q12H   vancomycin (VANCOCIN) 1,250 mg in dextrose 5 % 250 mL IVPB Q24H       Objective:  /75   Pulse 67   Temp 97.4 °F (36.3 °C) (Oral)   Resp 18   Ht 5' 8\" (1.727 m)   Wt 187 lb 14.4 oz (85.2 kg)   SpO2 97%   BMI 28.57 kg/m²     Intake/Output Summary (Last 24 hours) at 7/28/2019 0739  Last data filed at 7/28/2019 0703  Gross per 24 hour   Intake --   Output 1575 ml   Net -1575 ml      Wt Readings from Last 3 Encounters:   07/28/19 187 lb 14.4 oz (85.2 kg)   07/09/19 185 lb (83.9 kg)   05/17/19 207 lb (93.9 kg)       General appearance:  Appears comfortable  Eyes: Sclera clear. Pupils equal.  ENT: Moist oral mucosa.  Trachea midline, no adenopathy. Cardiovascular: Regular rhythm, normal S1, S2. No murmur. 1+ edema LE bilat  Respiratory: Not using accessory muscles. Good inspiratory effort. +  Crackles in bases bilaterally  GI: Abdomen soft, no tenderness, not distended  Musculoskeletal: No cyanosis in digits, neck supple  Neurology: No speech or motor deficits  Psych: Normal affect. Alert and oriented in time, place and person  Skin: Warm, dry, normal turgor    Labs and Tests:  CBC:   Recent Labs     07/26/19  0556 07/27/19  0736 07/28/19  0553   WBC 8.3 11.1* 10.8   HGB 12.0* 11.4* 10.7*    393 363     BMP:    No results for input(s): NA, K, CL, CO2, BUN, CREATININE, GLUCOSE in the last 72 hours. Hepatic:   No results for input(s): AST, ALT, ALB, BILITOT, ALKPHOS in the last 72 hours. Xr Chest Standard (2 Vw)    Result Date: 7/22/2019  EXAMINATION: TWO XRAY VIEWS OF THE CHEST 7/22/2019 3:02 pm COMPARISON: Prior outside chest CT, most recent 03/09/2018 HISTORY: ORDERING SYSTEM PROVIDED HISTORY: Pneumonia TECHNOLOGIST PROVIDED HISTORY: Reason for exam:->Pneumonia Acuity: Unknown Type of Exam: Unknown FINDINGS: The heart is upper normal size. There is patchy airspace disease bilaterally, with asymmetric distribution right parahilar and both lower lobes, greater on the right. There is relative sparing of the mid to upper left lung. Blunting of the costophrenic angle is seen bilaterally and posteriorly representing small pleural effusion. Bilateral lower lobe and right parahilar airspace disease with pleural effusion. This could represent multifocal pneumonia. However asymmetric congestive failure superimposed upon COPD can have a similar appearance. RECOMMENDATION: Follow-up is recommended preferably with upright PA and lateral views.      Xr Chest Standard (2 Vw)    Result Date: 7/17/2019  Site: Carlene Wall #: [id number] #: [id number]: Britney Acuña #: [de-identified] #: [id number] #: TECHNOLOGIST PROVIDED HISTORY: Reason for exam:->assess for infiltrates Reason for Exam: assess for infiltrates Acuity: Unknown Type of Exam: Initial FINDINGS: Frontal view of the chest demonstrates no lines or tubes. Stable cardiomediastinal silhouette. Small bilateral effusions are not significantly changed. Diffuse airspace opacities have worsened since the prior study. No pneumothorax. No acute osseous abnormality. 1. No lines or tubes. 2. Interval worsening of bilateral airspace opacities, right greater than left. 3. Stable small bilateral effusions. ASSESSMENT AND PLAN    Active Problems:    Pneumonia  Resolved Problems:    * No resolved hospital problems. *      Melanoma with metastasis to the lung  - S/p 1 cycle Opdivo + Yervoy 6/26/19.     Community acquired pneumonia  - Persistent fever on Levaquin.  - Pulmonology consulted, ordered cefepime and vancomycin.  - Blood cultures 7/22/19 negative. - Sputum cultures 7/22/19 with mixed kevin, final report pending.  - CT chest showed infiltrates are worse, solumedrol 40 mg q 12 hours started 7/25/19 (cover potential that this is immune pneumonitis). The x-rays also look somewhat fluid overloaded. The patient's IV fluids have been stopped and he is on Lasix. His weight was up nearly 9 pounds.     Peptic ulcer disease  - Protonix 40 mg BID.  - Had EGD 7/9/19.      Hypotension  - IV fluids continue, reduced to 75/hr. Labs stable        Yola Kin. John Rojas M.D., MPH  Co-Chair, Department of  Clinical [address]  Office [phone]  Cell [phone]  [email or web address]       \"Participating in a clinical trial is the first step to fighting cancer; not the last.\"    7/28/2019 7:39 AM

## 2019-07-28 NOTE — PROGRESS NOTES
polydipsia, vomiting and diarrhea and temperature intolerance  Allergic/Immunologic: negative for anaphylaxis, angioedema, hay fever and urticaria    Objective:     Patient Vitals for the past 8 hrs:   BP Temp Temp src Pulse Resp SpO2   07/28/19 1347 (!) 108/58 97.1 °F (36.2 °C) Axillary 66 18 98 %   07/28/19 0926 -- -- -- -- -- 95 %   07/28/19 0833 117/69 97.4 °F (36.3 °C) Oral 70 18 97 %   07/28/19 0804 -- -- -- -- -- 98 %     I/O last 3 completed shifts:  In: -   Out: 1375 [Urine:1375]  I/O this shift:  In: 240 [P.O.:240]  Out: 680 [Urine:680]    General Appearance: alert and oriented to person, place and time, well developed and well- nourished, in no acute distress  Skin: warm and dry, no rash or erythema  Head: normocephalic and atraumatic  Eyes: pupils equal, round, and reactive to light, extraocular eye movements intact, conjunctivae normal  ENT: external ear and ear canal normal bilaterally, nose without deformity, nasal mucosa and turbinates normal  Neck: supple and non-tender without mass, no cervical lymphadenopathy  Pulmonary/Chest: clear to auscultation bilaterally- no wheezes, rales or rhonchi, normal air movement, no respiratory distress  Cardiovascular: normal rate, regular rhythm,  no murmurs, rubs, distal pulses intact, no carotid bruits  Abdomen: soft, non-tender, non-distended, normal bowel sounds, no masses or organomegaly  Lymph Nodes: Cervical, supraclavicular normal  Extremities: no cyanosis, clubbing or edema  Musculoskeletal: normal range of motion, no joint swelling, deformity or tenderness  Neurologic: alert, no focal neurologic deficits    Data Review:  CBC:   Lab Results   Component Value Date    WBC 10.8 07/28/2019    RBC 3.74 07/28/2019    RBC 4.90 08/23/2017     BMP:   Lab Results   Component Value Date    GLUCOSE 78 07/23/2019    GLUCOSE 87 08/23/2017    CO2 23 07/23/2019    BUN 18 07/23/2019    CREATININE 1.1 07/23/2019    CALCIUM 7.6 07/23/2019     ABG: No results found for:

## 2019-07-29 ENCOUNTER — APPOINTMENT (OUTPATIENT)
Dept: GENERAL RADIOLOGY | Age: 79
DRG: 205 | End: 2019-07-29
Attending: INTERNAL MEDICINE
Payer: COMMERCIAL

## 2019-07-29 VITALS
SYSTOLIC BLOOD PRESSURE: 106 MMHG | HEART RATE: 61 BPM | OXYGEN SATURATION: 98 % | HEIGHT: 68 IN | DIASTOLIC BLOOD PRESSURE: 58 MMHG | WEIGHT: 187.9 LBS | BODY MASS INDEX: 28.48 KG/M2 | RESPIRATION RATE: 16 BRPM | TEMPERATURE: 97.5 F

## 2019-07-29 LAB
ANION GAP SERPL CALCULATED.3IONS-SCNC: 8 MMOL/L (ref 3–16)
BASOPHILS ABSOLUTE: 0 K/UL (ref 0–0.2)
BASOPHILS RELATIVE PERCENT: 0.1 %
BUN BLDV-MCNC: 15 MG/DL (ref 7–20)
CALCIUM SERPL-MCNC: 7.6 MG/DL (ref 8.3–10.6)
CHLORIDE BLD-SCNC: 106 MMOL/L (ref 99–110)
CO2: 25 MMOL/L (ref 21–32)
CREAT SERPL-MCNC: 0.7 MG/DL (ref 0.8–1.3)
EOSINOPHILS ABSOLUTE: 0 K/UL (ref 0–0.6)
EOSINOPHILS RELATIVE PERCENT: 0 %
GFR AFRICAN AMERICAN: >60
GFR NON-AFRICAN AMERICAN: >60
GLUCOSE BLD-MCNC: 127 MG/DL (ref 70–99)
HCT VFR BLD CALC: 30.8 % (ref 40.5–52.5)
HEMOGLOBIN: 10.4 G/DL (ref 13.5–17.5)
LYMPHOCYTES ABSOLUTE: 0.8 K/UL (ref 1–5.1)
LYMPHOCYTES RELATIVE PERCENT: 10.8 %
MCH RBC QN AUTO: 28.6 PG (ref 26–34)
MCHC RBC AUTO-ENTMCNC: 33.7 G/DL (ref 31–36)
MCV RBC AUTO: 84.9 FL (ref 80–100)
MONOCYTES ABSOLUTE: 0.2 K/UL (ref 0–1.3)
MONOCYTES RELATIVE PERCENT: 3 %
NEUTROPHILS ABSOLUTE: 6.8 K/UL (ref 1.7–7.7)
NEUTROPHILS RELATIVE PERCENT: 86.1 %
PDW BLD-RTO: 15.9 % (ref 12.4–15.4)
PLATELET # BLD: 352 K/UL (ref 135–450)
PMV BLD AUTO: 7.2 FL (ref 5–10.5)
POTASSIUM SERPL-SCNC: 3.4 MMOL/L (ref 3.5–5.1)
RBC # BLD: 3.63 M/UL (ref 4.2–5.9)
SODIUM BLD-SCNC: 139 MMOL/L (ref 136–145)
WBC # BLD: 7.9 K/UL (ref 4–11)

## 2019-07-29 PROCEDURE — 6360000002 HC RX W HCPCS: Performed by: INTERNAL MEDICINE

## 2019-07-29 PROCEDURE — 6370000000 HC RX 637 (ALT 250 FOR IP): Performed by: INTERNAL MEDICINE

## 2019-07-29 PROCEDURE — 71045 X-RAY EXAM CHEST 1 VIEW: CPT

## 2019-07-29 PROCEDURE — 80048 BASIC METABOLIC PNL TOTAL CA: CPT

## 2019-07-29 PROCEDURE — 2580000003 HC RX 258: Performed by: INTERNAL MEDICINE

## 2019-07-29 PROCEDURE — 2580000003 HC RX 258

## 2019-07-29 PROCEDURE — 85025 COMPLETE CBC W/AUTO DIFF WBC: CPT

## 2019-07-29 PROCEDURE — 99232 SBSQ HOSP IP/OBS MODERATE 35: CPT | Performed by: INTERNAL MEDICINE

## 2019-07-29 PROCEDURE — 6370000000 HC RX 637 (ALT 250 FOR IP): Performed by: NURSE PRACTITIONER

## 2019-07-29 PROCEDURE — 2580000003 HC RX 258: Performed by: NURSE PRACTITIONER

## 2019-07-29 PROCEDURE — 94760 N-INVAS EAR/PLS OXIMETRY 1: CPT

## 2019-07-29 PROCEDURE — 36415 COLL VENOUS BLD VENIPUNCTURE: CPT

## 2019-07-29 RX ORDER — PREDNISONE 20 MG/1
40 TABLET ORAL DAILY
Qty: 20 TABLET | Refills: 0 | Status: SHIPPED | OUTPATIENT
Start: 2019-07-29 | End: 2019-08-08

## 2019-07-29 RX ORDER — SODIUM CHLORIDE 9 MG/ML
INJECTION, SOLUTION INTRAVENOUS
Status: COMPLETED
Start: 2019-07-29 | End: 2019-07-29

## 2019-07-29 RX ORDER — FUROSEMIDE 20 MG/1
20 TABLET ORAL DAILY
Qty: 60 TABLET | Refills: 3 | Status: SHIPPED | OUTPATIENT
Start: 2019-07-30 | End: 2019-01-01

## 2019-07-29 RX ORDER — SULFAMETHOXAZOLE AND TRIMETHOPRIM 800; 160 MG/1; MG/1
1 TABLET ORAL
Qty: 15 TABLET | Refills: 0 | Status: SHIPPED | OUTPATIENT
Start: 2019-07-29 | End: 2019-01-01

## 2019-07-29 RX ADMIN — CEFEPIME HYDROCHLORIDE 2 G: 2 INJECTION, POWDER, FOR SOLUTION INTRAVENOUS at 04:15

## 2019-07-29 RX ADMIN — Medication 10 ML: at 05:50

## 2019-07-29 RX ADMIN — SODIUM CHLORIDE 100 ML: 9 INJECTION, SOLUTION INTRAVENOUS at 05:53

## 2019-07-29 RX ADMIN — PANTOPRAZOLE SODIUM 40 MG: 40 TABLET, DELAYED RELEASE ORAL at 05:49

## 2019-07-29 RX ADMIN — LIDOCAINE HYDROCHLORIDE 5 ML: 20 SOLUTION ORAL; TOPICAL at 08:53

## 2019-07-29 RX ADMIN — Medication 10 ML: at 08:52

## 2019-07-29 RX ADMIN — VANCOMYCIN HYDROCHLORIDE 1250 MG: 10 INJECTION, POWDER, LYOPHILIZED, FOR SOLUTION INTRAVENOUS at 05:50

## 2019-07-29 RX ADMIN — FUROSEMIDE 20 MG: 20 TABLET ORAL at 08:52

## 2019-07-29 ASSESSMENT — PAIN SCALES - GENERAL
PAINLEVEL_OUTOF10: 0

## 2019-07-29 NOTE — PROGRESS NOTES
[facility] Pulmonary/CCM Progress note      Admit Date: 7/22/2019    Chief Complaint: Cough, shortness of breath and fever    Subjective: Interval History: More energy, better appetite. Congested cough but no phlegm. No fevers. O2 sat 98% on room air.     Scheduled Meds:   furosemide  20 mg Oral Daily    fluconazole  100 mg Intravenous Q24H    magic (miracle) mouthwash with nystatin  5 mL Swish & Spit 4x Daily    pantoprazole  40 mg Oral BID    sodium chloride flush  10 mL Intravenous 2 times per day    enoxaparin  40 mg Subcutaneous Daily    cefepime  2 g Intravenous Q12H    vancomycin  15 mg/kg Intravenous Q24H     Continuous Infusions:    PRN Meds:fluticasone, acetaminophen, diphenhydrAMINE, tiZANidine, sodium chloride flush, magnesium hydroxide, ondansetron, magnesium sulfate, potassium chloride, mouth kote    Review of Systems  Constitutional: negative for fatigue, fevers, malaise and weight loss  Ears, nose, mouth, throat: negative for ear drainage, epistaxis, hoarseness, nasal congestion, sore throat and voice change  Respiratory: negative except for shortness of breath  Cardiovascular: negative for chest pain, chest pressure/discomfort, irregular heart beat, lower extremity edema and palpitations  Gastrointestinal: negative for abdominal pain, constipation, diarrhea, jaundice, melena, odynophagia, reflux symptoms and vomiting  Hematologic/lymphatic: negative for bleeding, easy bruising, lymphadenopathy and petechiae  Musculoskeletal:negative for arthralgias, bone pain, muscle weakness, neck pain and stiff joints  Neurological: negative for dizziness, gait problems, headaches, seizures, speech problems, tremors and weakness  Behavioral/Psych: negative for anxiety, behavior problems, depression, fatigue and sleep disturbance  Endocrine: negative for diabetic symptoms including none, neuropathy, polyphagia, polyuria, polydipsia, vomiting and diarrhea and temperature intolerance  Allergic/Immunologic:

## 2019-07-29 NOTE — DISCHARGE SUMMARY
these medications    aspirin 325 MG tablet     KEYTRUDA 100 MG/4ML Soln  Generic drug:  pembrolizumab     LEVAQUIN 500 MG tablet  Generic drug:  levofloxacin     methylPREDNISolone 32 MG tablet  Commonly known as:  MEDROL     omeprazole 20 MG delayed release capsule  Commonly known as:  PRILOSEC     pseudoephedrine 30 MG tablet  Commonly known as:  SUDAFED           Where to Get Your Medications      You can get these medications from any pharmacy    Bring a paper prescription for each of these medications  · furosemide 20 MG tablet  · predniSONE 20 MG tablet  · sulfamethoxazole-trimethoprim 800-160 MG per tablet         Discharged Condition: good    Disposition: home    Discharge Instructions: Activity: activity as tolerated  Diet: regular diet    Follow up: with Dr. Clark Amin on Thursday Aug 1, 2019. Neptali Hogan CNP  Oncology Hematology Care    Time spent in discharge process:  > 30 minutes with over half of that time spent in direct patient contact. Patient was seen this morning. Feeling better. No respiratory distress. Clinically pneumonia. We will stop Solu-Medrol. Discharge home today on Augmentin. Follow-up with Dr. Clark Amin. Pascual Olguin.  Librado Najera MD, [place] [address]  Hematology and Oncology  [facility]  [phone]

## 2019-07-29 NOTE — PROGRESS NOTES
for Exam: Dictated by: Ata Renae RUSLAN: 07/17/2019 03:06 PMT: This document is confidential medical information. Unauthorized disclosure or use of this information is prohibited by law. If you are not the intended recipient of this document, please advise us by calling immediately [phone]. Impression/Conclusion below HISTORY:       LUNG CA, SOB, COUGH, DRY MOUTH, POSSIBLE CHEST INFECTION. Malignant melanoma of left upper limb, including shoulder COMPARISON: Chest CT from June 14 and March 1 NOTE:  If there are questions about the content of this report, please contact [facility] radiology by calling [phone] FINDINGS: LINES/DEVICES: None LUNGS/PLEURA:  There is a small right effusion with patchy opacities throughout the right lower  lung field. Mild disease is suggested on the left. Many of these are shown to represent pleural-based lesions on the chest CT from June 19. There may be mild patchy new airspace disease in the right lower lobe. Vascularity is unremarkable HEART:  Unremarkable MEDIASTINUM/SUZANNE:  Unremarkable BONES:  Unremarkable OTHER:  None IMPRESSION: Patchy right basilar disease with mild pleural disease. There were pleural-based mass is evident on the recent CT from June, but this appears to have worsened slightly and acute right lower lobe pneumonia may be present. Subtle pleural-based increased density is present in the left lower lobe. SIGNED BY: Jason Mancilla MD on 7/17/2019  3:03 PM  [address] [place] [address] [phone] -  [address] [facility] [address]: [phone]      Ct Chest Wo Contrast    Result Date: 7/25/2019  EXAMINATION: CT OF THE CHEST WITHOUT CONTRAST 7/25/2019 9:31 am TECHNIQUE: CT of the chest was performed without the administration of intravenous contrast. Multiplanar reformatted images are provided for review.  Dose modulation, iterative reconstruction, and/or weight based adjustment of the mA/kV was utilized to reduce the radiation dose to as low Unknown Type of Exam: Initial FINDINGS: Frontal view of the chest demonstrates no lines or tubes. Stable cardiomediastinal silhouette. Small bilateral effusions are not significantly changed. Diffuse airspace opacities have worsened since the prior study. No pneumothorax. No acute osseous abnormality. 1. No lines or tubes. 2. Interval worsening of bilateral airspace opacities, right greater than left. 3. Stable small bilateral effusions. ASSESSMENT AND PLAN    Active Problems:    Pneumonia  Resolved Problems:    * No resolved hospital problems. *      Melanoma with metastasis to the lung  - S/p 1 cycle Opdivo + Yervoy 6/26/19.     Community acquired pneumonia  - Persistent fever on Levaquin.  - Pulmonology consulted, ordered cefepime and vancomycin.  - Blood cultures 7/22/19 negative. - Sputum cultures 7/22/19 with mixed kevin, final report pending.  - CT chest showed infiltrates are worse, solumedrol 40 mg q 12 hours started 7/25/19 (cover potential that this is immune pneumonitis). The x-rays also look somewhat fluid overloaded. The patient's IV fluids have been stopped and he is on Lasix.   - CXR this morning showed improvement, will stop solumedrol, continue antibiotics     Peptic ulcer disease  - Protonix 40 mg BID.  - Had EGD 7/9/19.      Hypotension  - BP improved        Nate Crawford CNP  [facility]

## 2019-07-29 NOTE — PROGRESS NOTES
VSS; respirations even, easy, and unlabored. Shift assessment complete, pt A&Ox4. Lungs CTA, bilateral lower lobes diminished. Oxygenating well on RA. Pulmonologist at bedside. Scheduled medications administered. Pt sitting on edge of bed. Lesions to tongue noted to be gone. Tongue is pink, moist, intact. Patient hopeful for discharge today. No further needs expressed. Call light within reach. Will continue to monitor.

## 2019-07-29 NOTE — PLAN OF CARE
Problem: Falls - Risk of:  Goal: Will remain free from falls  Description  Will remain free from falls  Outcome: Ongoing  Goal: Absence of physical injury  Description  Absence of physical injury  Outcome: Ongoing     Problem: Discharge Planning:  Goal: Discharged to appropriate level of care  Description  Discharged to appropriate level of care  Outcome: Ongoing  Goal: Participates in care planning  Description  Participates in care planning  Outcome: Ongoing     Problem: Airway Clearance - Ineffective:  Goal: Clear lung sounds  Description  Clear lung sounds  Outcome: Ongoing  Goal: Ability to maintain a clear airway will improve  Description  Ability to maintain a clear airway will improve  Outcome: Ongoing     Problem: Fluid Volume - Deficit:  Goal: Achieves intake and output within specified parameters  Description  Achieves intake and output within specified parameters  Outcome: Ongoing     Problem: Gas Exchange - Impaired:  Goal: Levels of oxygenation will improve  Description  Levels of oxygenation will improve  Outcome: Ongoing     Problem: Hyperthermia:  Goal: Ability to maintain a body temperature in the normal range will improve  Description  Ability to maintain a body temperature in the normal range will improve  Outcome: Ongoing     Problem: Tobacco Use:  Goal: Will participate in inpatient tobacco-use cessation counseling  Description  Will participate in inpatient tobacco-use cessation counseling  Outcome: Ongoing     Problem: Nutrition  Goal: Optimal nutrition therapy  Outcome: Ongoing     Problem: Pain:  Goal: Pain level will decrease  Description  Pain level will decrease  Outcome: Ongoing  Goal: Control of acute pain  Description  Control of acute pain  Outcome: Ongoing  Goal: Control of chronic pain  Description  Control of chronic pain  Outcome: Ongoing

## 2019-07-29 NOTE — PROGRESS NOTES
Patient has been discharged per MD orders. Patient verbalizes understanding of all instructions, medications, and follow up. IV has been removed, catheter intact, patent tolerated well. All belongings have been gathered and packed. Wife at bedside and verbalized understanding of all instructions. Prescriptions given to patient's wife. All questions answered. PCA taking patient out for discharge via wheelchair.

## 2019-07-29 NOTE — PROGRESS NOTES
Bedside report completed with Kim Pereira RN. Call light within reach, wife at bedside. Pt refusing alarm.

## 2019-07-30 ENCOUNTER — TELEPHONE (OUTPATIENT)
Dept: INTERNAL MEDICINE CLINIC | Age: 79
End: 2019-07-30

## 2019-07-30 ENCOUNTER — CARE COORDINATION (OUTPATIENT)
Dept: CASE MANAGEMENT | Age: 79
End: 2019-07-30

## 2019-07-30 DIAGNOSIS — J18.9 PNEUMONIA OF RIGHT LOWER LOBE DUE TO INFECTIOUS ORGANISM: Primary | ICD-10-CM

## 2019-08-02 ENCOUNTER — CARE COORDINATION (OUTPATIENT)
Dept: CASE MANAGEMENT | Age: 79
End: 2019-08-02

## 2019-08-02 NOTE — CARE COORDINATION
[facility] Transitions Follow Up Call    2019    Patient: Laquita   Patient : 1940   MRN: [id number]  Reason for Admission: Pneumonia  Discharge Date: 19 RARS: Readmission Risk Score: 15         Spoke with: [address] Transitions Subsequent and Final Call    Subsequent and Final Calls  Do you have any ongoing symptoms?:  No  Have your medications changed?:  No  Do you have any questions related to your medications?:  No  Do you currently have any active services?:  No  Do you have any needs or concerns that I can assist you with?:  No  Identified Barriers:  None  Care Transitions Interventions  Other Interventions: Follow Up: Patient reports that he is doing well, has already followed up with Dr. Marisabel Felton and will see PCP 8/15/19. He does not have any questions or concerns at this time, reports easy and even breathing, denies SOB. He also reports that he is taking in food without difficulty, has an appetite, and is gaining some weight. CTN will continue with follow up outreach calls.     Future Appointments   Date Time Provider Fabi Preston   8/15/2019  9:00 AM Kem Dandy, MD [facility] ASIA   2019  2:00 PM Eloy Franco MD [facility] & [address] [place] [address]       Tommy Schuler RN

## 2019-08-06 ENCOUNTER — CARE COORDINATION (OUTPATIENT)
Dept: CASE MANAGEMENT | Age: 79
End: 2019-08-06

## 2019-08-11 NOTE — TELEPHONE ENCOUNTER
Pt informed have not gotten imaging from [facility].  He stated his wife was on her way here with disc Pt to radiology via wheelchair accompanied by radiology technician. Pt verbalizes understanding of progression of care.

## 2019-08-15 ENCOUNTER — CARE COORDINATION (OUTPATIENT)
Dept: CASE MANAGEMENT | Age: 79
End: 2019-08-15

## 2019-08-15 NOTE — CARE COORDINATION
[facility] Transitions Follow Up Call    8/15/2019    Patient: Yamil Sawyer  Patient : 1940   MRN: [id number]  Reason for Admission:   Discharge Date: 19 RARS: Readmission Risk Score: 15         Spoke with: [address] Transitions Subsequent and Final Call    Subsequent and Final Calls  Do you have any ongoing symptoms?:  No  Have your medications changed?:  No  Do you have any questions related to your medications?:  No  Do you currently have any active services?:  No  Do you have any needs or concerns that I can assist you with?:  No  Identified Barriers:  None  Care Transitions Interventions  No Identified Needs  Other Interventions:          Pt states doing well, no issues or concerns. Sees Dr. Archibald Frankel weekly. No need for further f/u CTC calls.  Reminded patient that if they have any questions/concerns at anytime, they can always call PCP/specialist as they have an MD on call .            Follow Up  Future Appointments   Date Time Provider Fabi Preston   2019  2:00 PM French Toribio MD [facility] ASIA   2019  9:40 AM Caitlin Molina MD [facility] ASIA Marquez, RN

## 2019-09-10 ENCOUNTER — OFFICE VISIT (OUTPATIENT)
Dept: PULMONOLOGY | Age: 79
End: 2019-09-10
Payer: COMMERCIAL

## 2019-09-10 VITALS
OXYGEN SATURATION: 87 % | SYSTOLIC BLOOD PRESSURE: 94 MMHG | HEART RATE: 84 BPM | TEMPERATURE: 97.6 F | DIASTOLIC BLOOD PRESSURE: 61 MMHG

## 2019-09-10 DIAGNOSIS — C43.9 MALIGNANT MELANOMA, UNSPECIFIED SITE (HCC): Primary | ICD-10-CM

## 2019-09-10 DIAGNOSIS — J96.21 ACUTE ON CHRONIC RESPIRATORY FAILURE WITH HYPOXEMIA (HCC): ICD-10-CM

## 2019-09-10 PROBLEM — J98.4 PNEUMONITIS: Status: ACTIVE | Noted: 2019-07-22

## 2019-09-10 PROCEDURE — 99214 OFFICE O/P EST MOD 30 MIN: CPT | Performed by: INTERNAL MEDICINE

## 2019-09-10 RX ORDER — DOXYCYCLINE HYCLATE 100 MG
100 TABLET ORAL DAILY
Qty: 30 TABLET | Refills: 1 | Status: SHIPPED | OUTPATIENT
Start: 2019-09-10 | End: 2019-09-15

## 2019-09-10 RX ORDER — FLUCONAZOLE 100 MG/1
200 TABLET ORAL DAILY
Qty: 14 TABLET | Refills: 1 | Status: SHIPPED | OUTPATIENT
Start: 2019-09-10 | End: 2019-10-14 | Stop reason: SDUPTHER

## 2019-09-10 NOTE — PROGRESS NOTES
fluocinonide (LIDEX) 0.05 % ointment Apply topically as needed Apply topically 2 times daily. Yes Historical Provider, MD   Misc Natural Products ([address] MSM) TABS Take by mouth daily as needed  Yes Historical Provider, MD   triamcinolone (KENALOG) 0.1 % lotion Apply topically as needed Apply topically 3 times daily. Yes Historical Provider, MD   diphenhydrAMINE (BENADRYL) 25 MG capsule Take 25 mg by mouth as needed for Itching Yes Historical Provider, MD   furosemide (LASIX) 20 MG tablet Take 1 tablet by mouth daily  Patient not taking: Reported on 9/10/2019  Enrique Rubin, APRN - CNP       Vitals:    09/10/19 0834   BP: 94/61   Pulse: 84   Temp: 97.6 °F (36.4 °C)   TempSrc: Oral   SpO2: (!) 87%     There is no height or weight on file to calculate BMI.      Wt Readings from Last 3 Encounters:   07/28/19 187 lb 14.4 oz (85.2 kg)   07/09/19 185 lb (83.9 kg)   05/17/19 207 lb (93.9 kg)     BP Readings from Last 3 Encounters:   09/10/19 94/61   07/29/19 (!) 106/58   07/09/19 103/60        Social History     Tobacco Use   Smoking Status Former Smoker    Packs/day: 0.25    Years: 55.00    Pack years: 13.75    Types: Cigars    Last attempt to quit: 1/20/2016    Years since quitting: 3.6   Smokeless Tobacco Never Used   Tobacco Comment    cigars only

## 2019-09-19 ENCOUNTER — TELEPHONE (OUTPATIENT)
Dept: PULMONOLOGY | Age: 79
End: 2019-09-19

## 2019-09-19 NOTE — TELEPHONE ENCOUNTER
Pt was seen on 9/10/19 and was given Doxycycline 1 daily and Diflucan twice a day x 7 days for his thrush - pt took his last dose on Tuesday 9/17/19 and last night his tongue started to burn again - the pt is not on any inhalers to cause the thrush - Dr Yeh Lunch please advise

## 2019-09-23 ENCOUNTER — OFFICE VISIT (OUTPATIENT)
Dept: INTERNAL MEDICINE CLINIC | Age: 79
End: 2019-09-23
Payer: MEDICARE

## 2019-09-23 VITALS
WEIGHT: 185 LBS | BODY MASS INDEX: 28.13 KG/M2 | SYSTOLIC BLOOD PRESSURE: 100 MMHG | DIASTOLIC BLOOD PRESSURE: 56 MMHG | HEART RATE: 68 BPM

## 2019-09-23 DIAGNOSIS — I10 BENIGN ESSENTIAL HYPERTENSION: ICD-10-CM

## 2019-09-23 DIAGNOSIS — C43.8 MALIGNANT MELANOMA OF OVERLAPPING SITES (HCC): Primary | ICD-10-CM

## 2019-09-23 DIAGNOSIS — M79.10 MUSCLE PAIN: ICD-10-CM

## 2019-09-23 PROCEDURE — 99214 OFFICE O/P EST MOD 30 MIN: CPT | Performed by: INTERNAL MEDICINE

## 2019-09-23 NOTE — PROGRESS NOTES
Apply topically as needed      acetaminophen (TYLENOL) 500 MG tablet Take 500 mg by mouth every 6 hours as needed for Pain      Ca Carbonate-Mag Hydroxide (ANTACID EXTRA STRENGTH) 675-135 MG CHEW Take by mouth as needed      Skin Protectants, Misc. (EUCERIN) cream Apply topically as needed for Dry Skin Apply topically as needed.  fluocinonide (LIDEX) 0.05 % ointment Apply topically as needed Apply topically 2 times daily.  Misc Natural Products (GLUCOSAMINE CHONDROITIN MSM) TABS Take by mouth daily as needed       triamcinolone (KENALOG) 0.1 % lotion Apply topically as needed Apply topically 3 times daily.  diphenhydrAMINE (BENADRYL) 25 MG capsule Take 25 mg by mouth as needed for Itching       No current facility-administered medications on file prior to visit.         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    Obesity     PUD (peptic ulcer disease)     Skin cancer     Wheezing     since January           BP (!) 100/56   Pulse 68   Wt 185 lb (83.9 kg)   BMI 28.13 kg/m²     General Appearance:  He appears chronically but not acutely ill   Head:  Normocephalic, without obvious abnormality, atraumatic   Neck: Supple, symmetrical, trachea midline, no adenopathy, thyroid: not enlarged, symmetric, no tenderness/mass/nodules,     Lungs:    Diminished breath sounds without wheezing    Chest Wall:  No tenderness or deformity   Heart:  Regular rate and rhythm, S1, S2 normal, no murmur, rub or gallop, edema is improved   Abdomen:   Soft, non-tender, bowel sounds active all four quadrants,  no masses, no organomegaly genitourinary no hernias, no testicular masses    Skin:  Area of previous surgical excision shows some mild surrounding erythema but no purulent exudate,    Lymph nodes: Cervical, supraclavicular  Adenopathy is absent   No components found for: CHLPL  Lab Results

## 2019-09-27 ENCOUNTER — TELEPHONE (OUTPATIENT)
Dept: PULMONOLOGY | Age: 79
End: 2019-09-27

## 2019-10-11 ENCOUNTER — OFFICE VISIT (OUTPATIENT)
Dept: PULMONOLOGY | Age: 79
End: 2019-10-11
Payer: COMMERCIAL

## 2019-10-11 VITALS — OXYGEN SATURATION: 99 % | DIASTOLIC BLOOD PRESSURE: 78 MMHG | SYSTOLIC BLOOD PRESSURE: 135 MMHG | HEART RATE: 72 BPM

## 2019-10-11 DIAGNOSIS — C43.9 MALIGNANT MELANOMA, UNSPECIFIED SITE (HCC): Primary | ICD-10-CM

## 2019-10-11 DIAGNOSIS — J96.21 ACUTE ON CHRONIC RESPIRATORY FAILURE WITH HYPOXEMIA (HCC): ICD-10-CM

## 2019-10-11 DIAGNOSIS — R06.02 SHORTNESS OF BREATH: ICD-10-CM

## 2019-10-11 DIAGNOSIS — R91.8 PULMONARY NODULES: ICD-10-CM

## 2019-10-11 PROCEDURE — G8427 DOCREV CUR MEDS BY ELIG CLIN: HCPCS | Performed by: INTERNAL MEDICINE

## 2019-10-11 PROCEDURE — 1123F ACP DISCUSS/DSCN MKR DOCD: CPT | Performed by: INTERNAL MEDICINE

## 2019-10-11 PROCEDURE — 4040F PNEUMOC VAC/ADMIN/RCVD: CPT | Performed by: INTERNAL MEDICINE

## 2019-10-11 PROCEDURE — G8484 FLU IMMUNIZE NO ADMIN: HCPCS | Performed by: INTERNAL MEDICINE

## 2019-10-11 PROCEDURE — 99214 OFFICE O/P EST MOD 30 MIN: CPT | Performed by: INTERNAL MEDICINE

## 2019-10-11 PROCEDURE — G8417 CALC BMI ABV UP PARAM F/U: HCPCS | Performed by: INTERNAL MEDICINE

## 2019-10-11 PROCEDURE — 1036F TOBACCO NON-USER: CPT | Performed by: INTERNAL MEDICINE

## 2019-10-14 RX ORDER — FLUCONAZOLE 100 MG/1
TABLET ORAL
Qty: 14 TABLET | Refills: 0 | Status: SHIPPED | OUTPATIENT
Start: 2019-10-14 | End: 2019-01-01

## 2020-01-01 ENCOUNTER — OFFICE VISIT (OUTPATIENT)
Dept: INTERNAL MEDICINE CLINIC | Age: 80
End: 2020-01-01
Payer: MEDICARE

## 2020-01-01 ENCOUNTER — APPOINTMENT (OUTPATIENT)
Dept: INTERVENTIONAL RADIOLOGY/VASCULAR | Age: 80
DRG: 871 | End: 2020-01-01
Payer: COMMERCIAL

## 2020-01-01 ENCOUNTER — APPOINTMENT (OUTPATIENT)
Dept: GENERAL RADIOLOGY | Age: 80
DRG: 871 | End: 2020-01-01
Payer: COMMERCIAL

## 2020-01-01 ENCOUNTER — APPOINTMENT (OUTPATIENT)
Dept: CT IMAGING | Age: 80
DRG: 871 | End: 2020-01-01
Payer: COMMERCIAL

## 2020-01-01 ENCOUNTER — HOSPITAL ENCOUNTER (OUTPATIENT)
Dept: CT IMAGING | Age: 80
Discharge: HOME | End: 2020-06-25
Payer: COMMERCIAL

## 2020-01-01 ENCOUNTER — HOSPITAL ENCOUNTER (OUTPATIENT)
Dept: MRI IMAGING | Age: 80
Discharge: HOME | End: 2020-03-19
Payer: COMMERCIAL

## 2020-01-01 ENCOUNTER — HOSPITAL ENCOUNTER (INPATIENT)
Age: 80
LOS: 3 days | Discharge: HOSPICE | DRG: 871 | End: 2020-10-22
Attending: EMERGENCY MEDICINE | Admitting: INTERNAL MEDICINE
Payer: COMMERCIAL

## 2020-01-01 ENCOUNTER — OFFICE VISIT (OUTPATIENT)
Dept: PRIMARY CARE CLINIC | Age: 80
End: 2020-01-01
Payer: COMMERCIAL

## 2020-01-01 VITALS
BODY MASS INDEX: 26.46 KG/M2 | HEART RATE: 92 BPM | DIASTOLIC BLOOD PRESSURE: 80 MMHG | SYSTOLIC BLOOD PRESSURE: 120 MMHG | TEMPERATURE: 99.5 F | WEIGHT: 174 LBS | OXYGEN SATURATION: 99 %

## 2020-01-01 VITALS
HEART RATE: 66 BPM | HEIGHT: 68 IN | RESPIRATION RATE: 14 BRPM | TEMPERATURE: 97.5 F | SYSTOLIC BLOOD PRESSURE: 125 MMHG | DIASTOLIC BLOOD PRESSURE: 71 MMHG | OXYGEN SATURATION: 98 % | WEIGHT: 170 LBS | BODY MASS INDEX: 25.76 KG/M2

## 2020-01-01 VITALS
WEIGHT: 195 LBS | BODY MASS INDEX: 29.65 KG/M2 | DIASTOLIC BLOOD PRESSURE: 60 MMHG | OXYGEN SATURATION: 99 % | HEART RATE: 105 BPM | SYSTOLIC BLOOD PRESSURE: 120 MMHG

## 2020-01-01 VITALS
RESPIRATION RATE: 23 BRPM | WEIGHT: 137.2 LBS | HEIGHT: 68 IN | DIASTOLIC BLOOD PRESSURE: 31 MMHG | TEMPERATURE: 98 F | HEART RATE: 73 BPM | SYSTOLIC BLOOD PRESSURE: 58 MMHG | BODY MASS INDEX: 20.79 KG/M2 | OXYGEN SATURATION: 92 %

## 2020-01-01 LAB
A/G RATIO: 0.8 (ref 1.1–2.2)
ALBUMIN SERPL-MCNC: 2.2 G/DL (ref 3.4–5)
ALBUMIN SERPL-MCNC: 2.7 G/DL (ref 3.4–5)
ALBUMIN SERPL-MCNC: 3.2 G/DL (ref 3.4–5)
ALP BLD-CCNC: 173 U/L (ref 40–129)
ALP BLD-CCNC: 218 U/L (ref 40–129)
ALT SERPL-CCNC: 6 U/L (ref 10–40)
ALT SERPL-CCNC: 7 U/L (ref 10–40)
ANION GAP SERPL CALCULATED.3IONS-SCNC: 11 MMOL/L (ref 3–16)
ANION GAP SERPL CALCULATED.3IONS-SCNC: 12 MMOL/L (ref 3–16)
ANION GAP SERPL CALCULATED.3IONS-SCNC: 13 MMOL/L (ref 3–16)
ANION GAP SERPL CALCULATED.3IONS-SCNC: 14 MMOL/L (ref 3–16)
ANION GAP SERPL CALCULATED.3IONS-SCNC: 14 MMOL/L (ref 3–16)
ANION GAP SERPL CALCULATED.3IONS-SCNC: 17 MMOL/L (ref 3–16)
ANION GAP SERPL CALCULATED.3IONS-SCNC: 19 MMOL/L (ref 3–16)
ANION GAP SERPL CALCULATED.3IONS-SCNC: 9 MMOL/L (ref 3–16)
APTT: 30 SEC (ref 24.2–36.2)
AST SERPL-CCNC: 18 U/L (ref 15–37)
AST SERPL-CCNC: 20 U/L (ref 15–37)
BASE EXCESS VENOUS: -11.2 MMOL/L (ref -3–3)
BASOPHILS ABSOLUTE: 0 K/UL (ref 0–0.2)
BASOPHILS ABSOLUTE: 0 K/UL (ref 0–0.2)
BASOPHILS ABSOLUTE: 0.1 K/UL (ref 0–0.2)
BASOPHILS RELATIVE PERCENT: 0.1 %
BASOPHILS RELATIVE PERCENT: 0.5 %
BASOPHILS RELATIVE PERCENT: 0.6 %
BILIRUB SERPL-MCNC: 0.3 MG/DL (ref 0–1)
BILIRUB SERPL-MCNC: 0.4 MG/DL (ref 0–1)
BILIRUBIN DIRECT: <0.2 MG/DL (ref 0–0.3)
BILIRUBIN DIRECT: <0.2 MG/DL (ref 0–0.3)
BILIRUBIN URINE: ABNORMAL
BILIRUBIN, INDIRECT: ABNORMAL MG/DL (ref 0–1)
BILIRUBIN, INDIRECT: ABNORMAL MG/DL (ref 0–1)
BLOOD, URINE: NEGATIVE
BUN BLDV-MCNC: 16 MG/DL (ref 7–20)
BUN BLDV-MCNC: 75 MG/DL (ref 7–20)
BUN BLDV-MCNC: 76 MG/DL (ref 7–20)
BUN BLDV-MCNC: 76 MG/DL (ref 7–20)
BUN BLDV-MCNC: 77 MG/DL (ref 7–20)
BUN BLDV-MCNC: 77 MG/DL (ref 7–20)
BUN BLDV-MCNC: 79 MG/DL (ref 7–20)
BUN BLDV-MCNC: 79 MG/DL (ref 7–20)
C DIFF TOXIN/ANTIGEN: NORMAL
CALCIUM SERPL-MCNC: 7.7 MG/DL (ref 8.3–10.6)
CALCIUM SERPL-MCNC: 7.7 MG/DL (ref 8.3–10.6)
CALCIUM SERPL-MCNC: 7.9 MG/DL (ref 8.3–10.6)
CALCIUM SERPL-MCNC: 8 MG/DL (ref 8.3–10.6)
CALCIUM SERPL-MCNC: 8.1 MG/DL (ref 8.3–10.6)
CALCIUM SERPL-MCNC: 9 MG/DL (ref 8.3–10.6)
CALCIUM SERPL-MCNC: 9.3 MG/DL (ref 8.3–10.6)
CALCIUM SERPL-MCNC: 9.3 MG/DL (ref 8.3–10.6)
CARBOXYHEMOGLOBIN: 2.6 % (ref 0–1.5)
CHLORIDE BLD-SCNC: 100 MMOL/L (ref 99–110)
CHLORIDE BLD-SCNC: 105 MMOL/L (ref 99–110)
CHLORIDE BLD-SCNC: 94 MMOL/L (ref 99–110)
CHLORIDE BLD-SCNC: 94 MMOL/L (ref 99–110)
CHLORIDE BLD-SCNC: 95 MMOL/L (ref 99–110)
CHLORIDE BLD-SCNC: 98 MMOL/L (ref 99–110)
CHLORIDE BLD-SCNC: 99 MMOL/L (ref 99–110)
CHLORIDE BLD-SCNC: 99 MMOL/L (ref 99–110)
CHLORIDE URINE RANDOM: <20 MMOL/L
CLARITY: ABNORMAL
CO2: 10 MMOL/L (ref 21–32)
CO2: 12 MMOL/L (ref 21–32)
CO2: 16 MMOL/L (ref 21–32)
CO2: 17 MMOL/L (ref 21–32)
CO2: 17 MMOL/L (ref 21–32)
CO2: 18 MMOL/L (ref 21–32)
CO2: 18 MMOL/L (ref 21–32)
CO2: 22 MMOL/L (ref 21–32)
COLOR: ABNORMAL
CREAT SERPL-MCNC: 1.4 MG/DL (ref 0.8–1.3)
CREAT SERPL-MCNC: 3.3 MG/DL (ref 0.8–1.3)
CREAT SERPL-MCNC: 3.4 MG/DL (ref 0.8–1.3)
CREAT SERPL-MCNC: 3.5 MG/DL (ref 0.8–1.3)
CREATININE URINE: 146 MG/DL (ref 39–259)
EKG ATRIAL RATE: 87 BPM
EKG DIAGNOSIS: NORMAL
EKG Q-T INTERVAL: 340 MS
EKG QRS DURATION: 72 MS
EKG QTC CALCULATION (BAZETT): 411 MS
EKG R AXIS: -6 DEGREES
EKG T AXIS: -3 DEGREES
EKG VENTRICULAR RATE: 88 BPM
EOSINOPHILS ABSOLUTE: 0 K/UL (ref 0–0.6)
EOSINOPHILS RELATIVE PERCENT: 0 %
EOSINOPHILS RELATIVE PERCENT: 0.1 %
EOSINOPHILS RELATIVE PERCENT: 0.2 %
EPITHELIAL CELLS, UA: 2 /HPF (ref 0–5)
GFR AFRICAN AMERICAN: 21
GFR AFRICAN AMERICAN: 22
GFR AFRICAN AMERICAN: 59
GFR NON-AFRICAN AMERICAN: 17
GFR NON-AFRICAN AMERICAN: 18
GFR NON-AFRICAN AMERICAN: 49
GLOBULIN: 2.8 G/DL
GLUCOSE BLD-MCNC: 102 MG/DL (ref 70–99)
GLUCOSE BLD-MCNC: 111 MG/DL (ref 70–99)
GLUCOSE BLD-MCNC: 116 MG/DL (ref 70–99)
GLUCOSE BLD-MCNC: 118 MG/DL (ref 70–99)
GLUCOSE BLD-MCNC: 119 MG/DL (ref 70–99)
GLUCOSE BLD-MCNC: 125 MG/DL (ref 70–99)
GLUCOSE BLD-MCNC: 141 MG/DL (ref 70–99)
GLUCOSE BLD-MCNC: 141 MG/DL (ref 70–99)
GLUCOSE URINE: NEGATIVE MG/DL
HCO3 VENOUS: 14.9 MMOL/L (ref 23–29)
HCT VFR BLD CALC: 34.5 % (ref 40.5–52.5)
HCT VFR BLD CALC: 38.1 % (ref 40.5–52.5)
HCT VFR BLD CALC: 40.8 % (ref 40.5–52.5)
HCT VFR BLD CALC: 43.9 % (ref 40.5–52.5)
HEMOGLOBIN, VEN, REDUCED: 18 %
HEMOGLOBIN: 11.5 G/DL (ref 13.5–17.5)
HEMOGLOBIN: 12.6 G/DL (ref 13.5–17.5)
HEMOGLOBIN: 13.4 G/DL (ref 13.5–17.5)
HEMOGLOBIN: 14.5 G/DL (ref 13.5–17.5)
HYALINE CASTS: ABNORMAL /LPF (ref 0–2)
INR BLD: 1.03 (ref 0.86–1.14)
INR BLD: 1.05 (ref 0.86–1.14)
INR BLD: 1.07 (ref 0.86–1.14)
KETONES, URINE: ABNORMAL MG/DL
LACTIC ACID, SEPSIS: 3.5 MMOL/L (ref 0.4–1.9)
LACTIC ACID, SEPSIS: 4.3 MMOL/L (ref 0.4–1.9)
LACTIC ACID: 1.8 MMOL/L (ref 0.4–2)
LACTIC ACID: 2.2 MMOL/L (ref 0.4–2)
LACTIC ACID: 2.4 MMOL/L (ref 0.4–2)
LEUKOCYTE ESTERASE, URINE: ABNORMAL
LIPASE: 42 U/L (ref 13–60)
LYMPHOCYTES ABSOLUTE: 0 K/UL (ref 1–5.1)
LYMPHOCYTES ABSOLUTE: 0.1 K/UL (ref 1–5.1)
LYMPHOCYTES ABSOLUTE: 0.2 K/UL (ref 1–5.1)
LYMPHOCYTES RELATIVE PERCENT: 0.4 %
LYMPHOCYTES RELATIVE PERCENT: 0.7 %
LYMPHOCYTES RELATIVE PERCENT: 1.6 %
MAGNESIUM: 2.5 MG/DL (ref 1.8–2.4)
MCH RBC QN AUTO: 26 PG (ref 26–34)
MCH RBC QN AUTO: 28.5 PG (ref 26–34)
MCH RBC QN AUTO: 28.7 PG (ref 26–34)
MCH RBC QN AUTO: 28.7 PG (ref 26–34)
MCHC RBC AUTO-ENTMCNC: 32.9 G/DL (ref 31–36)
MCHC RBC AUTO-ENTMCNC: 33.1 G/DL (ref 31–36)
MCHC RBC AUTO-ENTMCNC: 33.2 G/DL (ref 31–36)
MCHC RBC AUTO-ENTMCNC: 33.4 G/DL (ref 31–36)
MCV RBC AUTO: 79 FL (ref 80–100)
MCV RBC AUTO: 85.8 FL (ref 80–100)
MCV RBC AUTO: 86.2 FL (ref 80–100)
MCV RBC AUTO: 86.6 FL (ref 80–100)
METHEMOGLOBIN VENOUS: 0 %
MICROSCOPIC EXAMINATION: YES
MONOCYTES ABSOLUTE: 0.2 K/UL (ref 0–1.3)
MONOCYTES ABSOLUTE: 0.4 K/UL (ref 0–1.3)
MONOCYTES ABSOLUTE: 0.4 K/UL (ref 0–1.3)
MONOCYTES RELATIVE PERCENT: 2.4 %
MONOCYTES RELATIVE PERCENT: 3.1 %
MONOCYTES RELATIVE PERCENT: 3.8 %
NEUTROPHILS ABSOLUTE: 11.1 K/UL (ref 1.7–7.7)
NEUTROPHILS ABSOLUTE: 11.4 K/UL (ref 1.7–7.7)
NEUTROPHILS ABSOLUTE: 8.8 K/UL (ref 1.7–7.7)
NEUTROPHILS RELATIVE PERCENT: 94.9 %
NEUTROPHILS RELATIVE PERCENT: 95.1 %
NEUTROPHILS RELATIVE PERCENT: 96.5 %
NITRITE, URINE: NEGATIVE
O2 CONTENT, VEN: 14 VOL %
O2 SAT, VEN: 81 %
O2 THERAPY: ABNORMAL
PCO2, VEN: 33.7 MMHG (ref 40–50)
PDW BLD-RTO: 16.2 % (ref 12.4–15.4)
PDW BLD-RTO: 18.1 % (ref 12.4–15.4)
PDW BLD-RTO: 18.2 % (ref 12.4–15.4)
PDW BLD-RTO: 18.2 % (ref 12.4–15.4)
PH UA: 5 (ref 5–8)
PH VENOUS: 7.25 (ref 7.35–7.45)
PHOSPHORUS: 4.8 MG/DL (ref 2.5–4.9)
PHOSPHORUS: 5.6 MG/DL (ref 2.5–4.9)
PLATELET # BLD: 210 K/UL (ref 135–450)
PLATELET # BLD: 329 K/UL (ref 135–450)
PLATELET # BLD: 416 K/UL (ref 135–450)
PLATELET # BLD: 423 K/UL (ref 135–450)
PMV BLD AUTO: 6.4 FL (ref 5–10.5)
PMV BLD AUTO: 6.5 FL (ref 5–10.5)
PMV BLD AUTO: 6.6 FL (ref 5–10.5)
PMV BLD AUTO: 6.6 FL (ref 5–10.5)
PO2, VEN: 49.6 MMHG (ref 25–40)
POTASSIUM REFLEX MAGNESIUM: 5.8 MMOL/L (ref 3.5–5.1)
POTASSIUM SERPL-SCNC: 3.7 MMOL/L (ref 3.5–5.1)
POTASSIUM SERPL-SCNC: 5 MMOL/L (ref 3.5–5.1)
POTASSIUM SERPL-SCNC: 5.6 MMOL/L (ref 3.5–5.1)
POTASSIUM SERPL-SCNC: 5.7 MMOL/L (ref 3.5–5.1)
POTASSIUM SERPL-SCNC: 6.2 MMOL/L (ref 3.5–5.1)
POTASSIUM SERPL-SCNC: 6.6 MMOL/L (ref 3.5–5.1)
POTASSIUM SERPL-SCNC: 6.7 MMOL/L (ref 3.5–5.1)
POTASSIUM, UR: 56.2 MMOL/L
PRO-BNP: 2755 PG/ML (ref 0–449)
PROCALCITONIN: 5.99 NG/ML (ref 0–0.15)
PROTEIN UA: 30 MG/DL
PROTHROMBIN TIME: 12 SEC (ref 10–13.2)
PROTHROMBIN TIME: 12.2 SEC (ref 10–13.2)
PROTHROMBIN TIME: 12.4 SEC (ref 10–13.2)
RBC # BLD: 4.02 M/UL (ref 4.2–5.9)
RBC # BLD: 4.42 M/UL (ref 4.2–5.9)
RBC # BLD: 5.06 M/UL (ref 4.2–5.9)
RBC # BLD: 5.17 M/UL (ref 4.2–5.9)
RBC UA: ABNORMAL /HPF (ref 0–4)
SARS-COV-2: NOT DETECTED
SODIUM BLD-SCNC: 124 MMOL/L (ref 136–145)
SODIUM BLD-SCNC: 126 MMOL/L (ref 136–145)
SODIUM BLD-SCNC: 126 MMOL/L (ref 136–145)
SODIUM BLD-SCNC: 127 MMOL/L (ref 136–145)
SODIUM BLD-SCNC: 127 MMOL/L (ref 136–145)
SODIUM BLD-SCNC: 128 MMOL/L (ref 136–145)
SODIUM BLD-SCNC: 129 MMOL/L (ref 136–145)
SODIUM BLD-SCNC: 136 MMOL/L (ref 136–145)
SODIUM URINE: <20 MMOL/L
SOURCE: NORMAL
SPECIFIC GRAVITY UA: 1.02 (ref 1–1.03)
TCO2 CALC VENOUS: 36 MMOL/L
TOTAL PROTEIN: 5 G/DL (ref 6.4–8.2)
TOTAL PROTEIN: 6.1 G/DL (ref 6.4–8.2)
TROPONIN: <0.01 NG/ML
TROPONIN: <0.01 NG/ML
UREA NITROGEN, UR: 497.8 MG/DL (ref 800–1666)
URIC ACID, SERUM: 10.2 MG/DL (ref 3.5–7.2)
URINE REFLEX TO CULTURE: ABNORMAL
URINE TYPE: ABNORMAL
UROBILINOGEN, URINE: 1 E.U./DL
VANCOMYCIN RANDOM: 5.3 UG/ML
VANCOMYCIN RANDOM: <4 UG/ML
WBC # BLD: 11.7 K/UL (ref 4–11)
WBC # BLD: 12 K/UL (ref 4–11)
WBC # BLD: 9.2 K/UL (ref 4–11)
WBC # BLD: 9.9 K/UL (ref 4–11)
WBC UA: 5 /HPF (ref 0–5)

## 2020-01-01 PROCEDURE — 6360000002 HC RX W HCPCS: Performed by: INTERNAL MEDICINE

## 2020-01-01 PROCEDURE — 36415 COLL VENOUS BLD VENIPUNCTURE: CPT

## 2020-01-01 PROCEDURE — 99223 1ST HOSP IP/OBS HIGH 75: CPT | Performed by: INTERNAL MEDICINE

## 2020-01-01 PROCEDURE — 2580000003 HC RX 258: Performed by: INTERNAL MEDICINE

## 2020-01-01 PROCEDURE — 6370000000 HC RX 637 (ALT 250 FOR IP): Performed by: INTERNAL MEDICINE

## 2020-01-01 PROCEDURE — 2500000003 HC RX 250 WO HCPCS: Performed by: INTERNAL MEDICINE

## 2020-01-01 PROCEDURE — 81001 URINALYSIS AUTO W/SCOPE: CPT

## 2020-01-01 PROCEDURE — G8428 CUR MEDS NOT DOCUMENT: HCPCS | Performed by: NURSE PRACTITIONER

## 2020-01-01 PROCEDURE — 80202 ASSAY OF VANCOMYCIN: CPT

## 2020-01-01 PROCEDURE — 82570 ASSAY OF URINE CREATININE: CPT

## 2020-01-01 PROCEDURE — 71045 X-RAY EXAM CHEST 1 VIEW: CPT

## 2020-01-01 PROCEDURE — 88313 SPECIAL STAINS GROUP 2: CPT

## 2020-01-01 PROCEDURE — 84145 PROCALCITONIN (PCT): CPT

## 2020-01-01 PROCEDURE — 99285 EMERGENCY DEPT VISIT HI MDM: CPT

## 2020-01-01 PROCEDURE — 84133 ASSAY OF URINE POTASSIUM: CPT

## 2020-01-01 PROCEDURE — 1036F TOBACCO NON-USER: CPT | Performed by: INTERNAL MEDICINE

## 2020-01-01 PROCEDURE — 6360000002 HC RX W HCPCS: Performed by: HOSPITALIST

## 2020-01-01 PROCEDURE — 7100000010 HC PHASE II RECOVERY - FIRST 15 MIN

## 2020-01-01 PROCEDURE — 85610 PROTHROMBIN TIME: CPT

## 2020-01-01 PROCEDURE — 88342 IMHCHEM/IMCYTCHM 1ST ANTB: CPT

## 2020-01-01 PROCEDURE — G8417 CALC BMI ABV UP PARAM F/U: HCPCS | Performed by: NURSE PRACTITIONER

## 2020-01-01 PROCEDURE — 82436 ASSAY OF URINE CHLORIDE: CPT

## 2020-01-01 PROCEDURE — G8427 DOCREV CUR MEDS BY ELIG CLIN: HCPCS | Performed by: INTERNAL MEDICINE

## 2020-01-01 PROCEDURE — G8417 CALC BMI ABV UP PARAM F/U: HCPCS | Performed by: INTERNAL MEDICINE

## 2020-01-01 PROCEDURE — 85730 THROMBOPLASTIN TIME PARTIAL: CPT

## 2020-01-01 PROCEDURE — 83605 ASSAY OF LACTIC ACID: CPT

## 2020-01-01 PROCEDURE — P9047 ALBUMIN (HUMAN), 25%, 50ML: HCPCS | Performed by: INTERNAL MEDICINE

## 2020-01-01 PROCEDURE — 49083 ABD PARACENTESIS W/IMAGING: CPT

## 2020-01-01 PROCEDURE — APPSS15 APP SPLIT SHARED TIME 0-15 MINUTES: Performed by: NURSE PRACTITIONER

## 2020-01-01 PROCEDURE — 4040F PNEUMOC VAC/ADMIN/RCVD: CPT | Performed by: INTERNAL MEDICINE

## 2020-01-01 PROCEDURE — 82803 BLOOD GASES ANY COMBINATION: CPT

## 2020-01-01 PROCEDURE — 1200000000 HC SEMI PRIVATE

## 2020-01-01 PROCEDURE — 85025 COMPLETE CBC W/AUTO DIFF WBC: CPT

## 2020-01-01 PROCEDURE — 84300 ASSAY OF URINE SODIUM: CPT

## 2020-01-01 PROCEDURE — 94640 AIRWAY INHALATION TREATMENT: CPT

## 2020-01-01 PROCEDURE — 83880 ASSAY OF NATRIURETIC PEPTIDE: CPT

## 2020-01-01 PROCEDURE — C1729 CATH, DRAINAGE: HCPCS

## 2020-01-01 PROCEDURE — 87040 BLOOD CULTURE FOR BACTERIA: CPT

## 2020-01-01 PROCEDURE — 99214 OFFICE O/P EST MOD 30 MIN: CPT | Performed by: INTERNAL MEDICINE

## 2020-01-01 PROCEDURE — 99232 SBSQ HOSP IP/OBS MODERATE 35: CPT | Performed by: SURGERY

## 2020-01-01 PROCEDURE — 74176 CT ABD & PELVIS W/O CONTRAST: CPT

## 2020-01-01 PROCEDURE — 1123F ACP DISCUSS/DSCN MKR DOCD: CPT | Performed by: INTERNAL MEDICINE

## 2020-01-01 PROCEDURE — 80048 BASIC METABOLIC PNL TOTAL CA: CPT

## 2020-01-01 PROCEDURE — 88312 SPECIAL STAINS GROUP 1: CPT

## 2020-01-01 PROCEDURE — 3288F FALL RISK ASSESSMENT DOCD: CPT | Performed by: INTERNAL MEDICINE

## 2020-01-01 PROCEDURE — 6360000002 HC RX W HCPCS: Performed by: EMERGENCY MEDICINE

## 2020-01-01 PROCEDURE — 93005 ELECTROCARDIOGRAM TRACING: CPT | Performed by: EMERGENCY MEDICINE

## 2020-01-01 PROCEDURE — 84484 ASSAY OF TROPONIN QUANT: CPT

## 2020-01-01 PROCEDURE — 87324 CLOSTRIDIUM AG IA: CPT

## 2020-01-01 PROCEDURE — 88185 FLOWCYTOMETRY/TC ADD-ON: CPT

## 2020-01-01 PROCEDURE — APPNB30 APP NON BILLABLE TIME 0-30 MINS: Performed by: NURSE PRACTITIONER

## 2020-01-01 PROCEDURE — G8484 FLU IMMUNIZE NO ADMIN: HCPCS | Performed by: INTERNAL MEDICINE

## 2020-01-01 PROCEDURE — 7100000011 HC PHASE II RECOVERY - ADDTL 15 MIN

## 2020-01-01 PROCEDURE — 93000 ELECTROCARDIOGRAM COMPLETE: CPT | Performed by: INTERNAL MEDICINE

## 2020-01-01 PROCEDURE — 2580000003 HC RX 258: Performed by: EMERGENCY MEDICINE

## 2020-01-01 PROCEDURE — 80076 HEPATIC FUNCTION PANEL: CPT

## 2020-01-01 PROCEDURE — 94761 N-INVAS EAR/PLS OXIMETRY MLT: CPT

## 2020-01-01 PROCEDURE — 0W9G3ZZ DRAINAGE OF PERITONEAL CAVITY, PERCUTANEOUS APPROACH: ICD-10-PCS | Performed by: INTERNAL MEDICINE

## 2020-01-01 PROCEDURE — 84550 ASSAY OF BLOOD/URIC ACID: CPT

## 2020-01-01 PROCEDURE — 74018 RADEX ABDOMEN 1 VIEW: CPT

## 2020-01-01 PROCEDURE — 84540 ASSAY OF URINE/UREA-N: CPT

## 2020-01-01 PROCEDURE — 2000000000 HC ICU R&B

## 2020-01-01 PROCEDURE — 88305 TISSUE EXAM BY PATHOLOGIST: CPT

## 2020-01-01 PROCEDURE — 85027 COMPLETE CBC AUTOMATED: CPT

## 2020-01-01 PROCEDURE — 96365 THER/PROPH/DIAG IV INF INIT: CPT

## 2020-01-01 PROCEDURE — 99232 SBSQ HOSP IP/OBS MODERATE 35: CPT | Performed by: INTERNAL MEDICINE

## 2020-01-01 PROCEDURE — 88184 FLOWCYTOMETRY/ TC 1 MARKER: CPT

## 2020-01-01 PROCEDURE — 2700000000 HC OXYGEN THERAPY PER DAY

## 2020-01-01 PROCEDURE — 38505 NEEDLE BIOPSY LYMPH NODES: CPT

## 2020-01-01 PROCEDURE — 80069 RENAL FUNCTION PANEL: CPT

## 2020-01-01 PROCEDURE — 88341 IMHCHEM/IMCYTCHM EA ADD ANTB: CPT

## 2020-01-01 PROCEDURE — 6370000000 HC RX 637 (ALT 250 FOR IP): Performed by: EMERGENCY MEDICINE

## 2020-01-01 PROCEDURE — 87449 NOS EACH ORGANISM AG IA: CPT

## 2020-01-01 PROCEDURE — G8510 SCR DEP NEG, NO PLAN REQD: HCPCS | Performed by: INTERNAL MEDICINE

## 2020-01-01 PROCEDURE — 99211 OFF/OP EST MAY X REQ PHY/QHP: CPT | Performed by: NURSE PRACTITIONER

## 2020-01-01 PROCEDURE — 70551 MRI BRAIN STEM W/O DYE: CPT

## 2020-01-01 PROCEDURE — 83690 ASSAY OF LIPASE: CPT

## 2020-01-01 PROCEDURE — 99213 OFFICE O/P EST LOW 20 MIN: CPT | Performed by: INTERNAL MEDICINE

## 2020-01-01 PROCEDURE — 96375 TX/PRO/DX INJ NEW DRUG ADDON: CPT

## 2020-01-01 PROCEDURE — APPSS60 APP SPLIT SHARED TIME 46-60 MINUTES: Performed by: NURSE PRACTITIONER

## 2020-01-01 PROCEDURE — 83735 ASSAY OF MAGNESIUM: CPT

## 2020-01-01 PROCEDURE — 0W9G3ZZ DRAINAGE OF PERITONEAL CAVITY, PERCUTANEOUS APPROACH: ICD-10-PCS | Performed by: RADIOLOGY

## 2020-01-01 PROCEDURE — 80053 COMPREHEN METABOLIC PANEL: CPT

## 2020-01-01 PROCEDURE — 93010 ELECTROCARDIOGRAM REPORT: CPT | Performed by: INTERNAL MEDICINE

## 2020-01-01 PROCEDURE — 6360000002 HC RX W HCPCS: Performed by: RADIOLOGY

## 2020-01-01 RX ORDER — FENTANYL CITRATE 50 UG/ML
INJECTION, SOLUTION INTRAMUSCULAR; INTRAVENOUS
Status: COMPLETED | OUTPATIENT
Start: 2020-01-01 | End: 2020-01-01

## 2020-01-01 RX ORDER — SCOLOPAMINE TRANSDERMAL SYSTEM 1 MG/1
1 PATCH, EXTENDED RELEASE TRANSDERMAL
Status: CANCELLED | OUTPATIENT
Start: 2020-10-25

## 2020-01-01 RX ORDER — LIDOCAINE HYDROCHLORIDE 10 MG/ML
20 INJECTION, SOLUTION EPIDURAL; INFILTRATION; INTRACAUDAL; PERINEURAL ONCE
Status: COMPLETED | OUTPATIENT
Start: 2020-01-01 | End: 2020-01-01

## 2020-01-01 RX ORDER — SODIUM CHLORIDE, SODIUM LACTATE, POTASSIUM CHLORIDE, AND CALCIUM CHLORIDE .6; .31; .03; .02 G/100ML; G/100ML; G/100ML; G/100ML
1000 INJECTION, SOLUTION INTRAVENOUS ONCE
Status: DISCONTINUED | OUTPATIENT
Start: 2020-01-01 | End: 2020-01-01

## 2020-01-01 RX ORDER — LORAZEPAM 2 MG/ML
2 INJECTION INTRAMUSCULAR
Status: CANCELLED | OUTPATIENT
Start: 2020-01-01

## 2020-01-01 RX ORDER — MORPHINE SULFATE 2 MG/ML
1 INJECTION, SOLUTION INTRAMUSCULAR; INTRAVENOUS ONCE
Status: COMPLETED | OUTPATIENT
Start: 2020-01-01 | End: 2020-01-01

## 2020-01-01 RX ORDER — ACETAMINOPHEN 325 MG/1
650 TABLET ORAL EVERY 4 HOURS PRN
Status: DISCONTINUED | OUTPATIENT
Start: 2020-01-01 | End: 2020-01-01 | Stop reason: HOSPADM

## 2020-01-01 RX ORDER — MORPHINE SULFATE 4 MG/ML
4 INJECTION, SOLUTION INTRAMUSCULAR; INTRAVENOUS
Status: DISCONTINUED | OUTPATIENT
Start: 2020-01-01 | End: 2020-01-01 | Stop reason: HOSPADM

## 2020-01-01 RX ORDER — LORAZEPAM 2 MG/ML
2 CONCENTRATE ORAL
Qty: 1 BOTTLE | Refills: 0 | Status: SHIPPED | OUTPATIENT
Start: 2020-01-01

## 2020-01-01 RX ORDER — MORPHINE SULFATE 4 MG/ML
4 INJECTION, SOLUTION INTRAMUSCULAR; INTRAVENOUS
Status: CANCELLED | OUTPATIENT
Start: 2020-01-01

## 2020-01-01 RX ORDER — LORAZEPAM 2 MG/ML
2 INJECTION INTRAMUSCULAR
Status: DISCONTINUED | OUTPATIENT
Start: 2020-01-01 | End: 2020-01-01 | Stop reason: HOSPADM

## 2020-01-01 RX ORDER — MIDAZOLAM HYDROCHLORIDE 1 MG/ML
INJECTION INTRAMUSCULAR; INTRAVENOUS
Status: COMPLETED | OUTPATIENT
Start: 2020-01-01 | End: 2020-01-01

## 2020-01-01 RX ORDER — PROMETHAZINE HYDROCHLORIDE 25 MG/1
12.5 TABLET ORAL EVERY 6 HOURS
Status: DISCONTINUED | OUTPATIENT
Start: 2020-01-01 | End: 2020-01-01 | Stop reason: HOSPADM

## 2020-01-01 RX ORDER — LORAZEPAM 2 MG/ML
0.5 INJECTION INTRAMUSCULAR EVERY 6 HOURS PRN
Status: DISCONTINUED | OUTPATIENT
Start: 2020-01-01 | End: 2020-01-01

## 2020-01-01 RX ORDER — ACETAMINOPHEN 325 MG/1
650 TABLET ORAL EVERY 6 HOURS PRN
Status: CANCELLED | OUTPATIENT
Start: 2020-01-01

## 2020-01-01 RX ORDER — ALBUMIN (HUMAN) 12.5 G/50ML
25 SOLUTION INTRAVENOUS EVERY 8 HOURS
Status: DISCONTINUED | OUTPATIENT
Start: 2020-01-01 | End: 2020-01-01

## 2020-01-01 RX ORDER — PROMETHAZINE HYDROCHLORIDE 25 MG/1
12.5 TABLET ORAL EVERY 6 HOURS PRN
Status: DISCONTINUED | OUTPATIENT
Start: 2020-01-01 | End: 2020-01-01

## 2020-01-01 RX ORDER — ONDANSETRON 2 MG/ML
4 INJECTION INTRAMUSCULAR; INTRAVENOUS EVERY 6 HOURS PRN
Status: DISCONTINUED | OUTPATIENT
Start: 2020-01-01 | End: 2020-01-01

## 2020-01-01 RX ORDER — 0.9 % SODIUM CHLORIDE 0.9 %
1000 INTRAVENOUS SOLUTION INTRAVENOUS ONCE
Status: COMPLETED | OUTPATIENT
Start: 2020-01-01 | End: 2020-01-01

## 2020-01-01 RX ORDER — ONDANSETRON 2 MG/ML
4 INJECTION INTRAMUSCULAR; INTRAVENOUS EVERY 6 HOURS
Status: DISCONTINUED | OUTPATIENT
Start: 2020-01-01 | End: 2020-01-01 | Stop reason: HOSPADM

## 2020-01-01 RX ORDER — MORPHINE SULFATE 20 MG/5ML
2.5 SOLUTION ORAL
Qty: 1 BOTTLE | Refills: 0 | Status: SHIPPED | OUTPATIENT
Start: 2020-01-01 | End: 2020-10-24

## 2020-01-01 RX ORDER — SCOLOPAMINE TRANSDERMAL SYSTEM 1 MG/1
1 PATCH, EXTENDED RELEASE TRANSDERMAL
Status: DISCONTINUED | OUTPATIENT
Start: 2020-01-01 | End: 2020-01-01 | Stop reason: HOSPADM

## 2020-01-01 RX ORDER — ALBUMIN (HUMAN) 12.5 G/50ML
25 SOLUTION INTRAVENOUS ONCE
Status: COMPLETED | OUTPATIENT
Start: 2020-01-01 | End: 2020-01-01

## 2020-01-01 RX ORDER — HEPARIN SODIUM 5000 [USP'U]/ML
5000 INJECTION, SOLUTION INTRAVENOUS; SUBCUTANEOUS EVERY 8 HOURS SCHEDULED
Status: DISCONTINUED | OUTPATIENT
Start: 2020-01-01 | End: 2020-01-01

## 2020-01-01 RX ORDER — SODIUM CHLORIDE 0.9 % (FLUSH) 0.9 %
10 SYRINGE (ML) INJECTION PRN
Status: DISCONTINUED | OUTPATIENT
Start: 2020-01-01 | End: 2020-01-01 | Stop reason: HOSPADM

## 2020-01-01 RX ORDER — ACETAMINOPHEN 325 MG/1
650 TABLET ORAL EVERY 6 HOURS PRN
Status: DISCONTINUED | OUTPATIENT
Start: 2020-01-01 | End: 2020-01-01 | Stop reason: HOSPADM

## 2020-01-01 RX ORDER — PROMETHAZINE HYDROCHLORIDE 25 MG/ML
12.5 INJECTION, SOLUTION INTRAMUSCULAR; INTRAVENOUS EVERY 6 HOURS PRN
Status: DISCONTINUED | OUTPATIENT
Start: 2020-01-01 | End: 2020-01-01 | Stop reason: HOSPADM

## 2020-01-01 RX ORDER — SODIUM CHLORIDE 0.9 % (FLUSH) 0.9 %
10 SYRINGE (ML) INJECTION EVERY 12 HOURS SCHEDULED
Status: DISCONTINUED | OUTPATIENT
Start: 2020-01-01 | End: 2020-01-01 | Stop reason: HOSPADM

## 2020-01-01 RX ORDER — ALBUMIN (HUMAN) 12.5 G/50ML
25 SOLUTION INTRAVENOUS EVERY 6 HOURS
Status: DISCONTINUED | OUTPATIENT
Start: 2020-01-01 | End: 2020-01-01

## 2020-01-01 RX ORDER — POLYETHYLENE GLYCOL 3350 17 G/17G
17 POWDER, FOR SOLUTION ORAL DAILY PRN
Status: DISCONTINUED | OUTPATIENT
Start: 2020-01-01 | End: 2020-01-01 | Stop reason: HOSPADM

## 2020-01-01 RX ORDER — DEXTROSE MONOHYDRATE 25 G/50ML
25 INJECTION, SOLUTION INTRAVENOUS ONCE
Status: COMPLETED | OUTPATIENT
Start: 2020-01-01 | End: 2020-01-01

## 2020-01-01 RX ORDER — 0.9 % SODIUM CHLORIDE 0.9 %
1100 INTRAVENOUS SOLUTION INTRAVENOUS ONCE
Status: COMPLETED | OUTPATIENT
Start: 2020-01-01 | End: 2020-01-01

## 2020-01-01 RX ORDER — ASPIRIN 325 MG
325 TABLET ORAL DAILY
Status: ON HOLD | COMMUNITY
End: 2020-01-01 | Stop reason: HOSPADM

## 2020-01-01 RX ORDER — ACETAMINOPHEN 650 MG/1
650 SUPPOSITORY RECTAL EVERY 6 HOURS PRN
Status: CANCELLED | OUTPATIENT
Start: 2020-01-01

## 2020-01-01 RX ORDER — ACETAMINOPHEN 650 MG/1
650 SUPPOSITORY RECTAL EVERY 6 HOURS PRN
Status: DISCONTINUED | OUTPATIENT
Start: 2020-01-01 | End: 2020-01-01 | Stop reason: HOSPADM

## 2020-01-01 RX ADMIN — ALBUMIN (HUMAN) 25 G: 0.25 INJECTION, SOLUTION INTRAVENOUS at 22:51

## 2020-01-01 RX ADMIN — SODIUM BICARBONATE: 84 INJECTION, SOLUTION INTRAVENOUS at 17:52

## 2020-01-01 RX ADMIN — VANCOMYCIN HYDROCHLORIDE 1000 MG: 1 INJECTION, POWDER, LYOPHILIZED, FOR SOLUTION INTRAVENOUS at 18:34

## 2020-01-01 RX ADMIN — SODIUM CHLORIDE 1000 ML: 9 INJECTION, SOLUTION INTRAVENOUS at 16:53

## 2020-01-01 RX ADMIN — LIDOCAINE HYDROCHLORIDE 8 ML: 10 INJECTION, SOLUTION EPIDURAL; INFILTRATION; INTRACAUDAL; PERINEURAL at 08:27

## 2020-01-01 RX ADMIN — FENTANYL CITRATE 25 MCG: 50 INJECTION INTRAMUSCULAR; INTRAVENOUS at 09:18

## 2020-01-01 RX ADMIN — HEPARIN SODIUM 5000 UNITS: 5000 INJECTION INTRAVENOUS; SUBCUTANEOUS at 15:18

## 2020-01-01 RX ADMIN — DEXTROSE MONOHYDRATE 100 MG: 50 INJECTION, SOLUTION INTRAVENOUS at 22:20

## 2020-01-01 RX ADMIN — SODIUM CHLORIDE 1000 ML: 9 INJECTION, SOLUTION INTRAVENOUS at 17:51

## 2020-01-01 RX ADMIN — LORAZEPAM 0.5 MG: 2 INJECTION INTRAMUSCULAR; INTRAVENOUS at 00:10

## 2020-01-01 RX ADMIN — Medication 10 ML: at 08:58

## 2020-01-01 RX ADMIN — PROMETHAZINE HYDROCHLORIDE 12.5 MG: 25 TABLET ORAL at 09:50

## 2020-01-01 RX ADMIN — ALBUMIN (HUMAN) 25 G: 0.25 INJECTION, SOLUTION INTRAVENOUS at 15:45

## 2020-01-01 RX ADMIN — ONDANSETRON 4 MG: 2 INJECTION INTRAMUSCULAR; INTRAVENOUS at 04:22

## 2020-01-01 RX ADMIN — MORPHINE SULFATE 4 MG: 4 INJECTION, SOLUTION INTRAMUSCULAR; INTRAVENOUS at 08:54

## 2020-01-01 RX ADMIN — ONDANSETRON 4 MG: 2 INJECTION INTRAMUSCULAR; INTRAVENOUS at 21:47

## 2020-01-01 RX ADMIN — LORAZEPAM 0.5 MG: 2 INJECTION INTRAMUSCULAR; INTRAVENOUS at 18:22

## 2020-01-01 RX ADMIN — ALBUMIN (HUMAN) 25 G: 0.25 INJECTION, SOLUTION INTRAVENOUS at 10:40

## 2020-01-01 RX ADMIN — HEPARIN SODIUM 5000 UNITS: 5000 INJECTION INTRAVENOUS; SUBCUTANEOUS at 06:38

## 2020-01-01 RX ADMIN — DEXTROSE MONOHYDRATE 100 MG: 50 INJECTION, SOLUTION INTRAVENOUS at 04:57

## 2020-01-01 RX ADMIN — DEXTROSE MONOHYDRATE 100 MG: 50 INJECTION, SOLUTION INTRAVENOUS at 06:21

## 2020-01-01 RX ADMIN — MORPHINE SULFATE 1 MG: 2 INJECTION, SOLUTION INTRAMUSCULAR; INTRAVENOUS at 02:32

## 2020-01-01 RX ADMIN — MIDAZOLAM 0.5 MG: 1 INJECTION INTRAMUSCULAR; INTRAVENOUS at 09:18

## 2020-01-01 RX ADMIN — SODIUM BICARBONATE: 84 INJECTION, SOLUTION INTRAVENOUS at 01:09

## 2020-01-01 RX ADMIN — HEPARIN SODIUM 5000 UNITS: 5000 INJECTION INTRAVENOUS; SUBCUTANEOUS at 06:14

## 2020-01-01 RX ADMIN — HEPARIN SODIUM 5000 UNITS: 5000 INJECTION INTRAVENOUS; SUBCUTANEOUS at 23:26

## 2020-01-01 RX ADMIN — ONDANSETRON 4 MG: 2 INJECTION INTRAMUSCULAR; INTRAVENOUS at 09:25

## 2020-01-01 RX ADMIN — ALBUMIN (HUMAN) 25 G: 0.25 INJECTION, SOLUTION INTRAVENOUS at 03:11

## 2020-01-01 RX ADMIN — DEXTROSE MONOHYDRATE 100 MG: 50 INJECTION, SOLUTION INTRAVENOUS at 19:51

## 2020-01-01 RX ADMIN — FENTANYL CITRATE 25 MCG: 50 INJECTION INTRAMUSCULAR; INTRAVENOUS at 09:24

## 2020-01-01 RX ADMIN — MEROPENEM 500 MG: 500 INJECTION, POWDER, FOR SOLUTION INTRAVENOUS at 05:00

## 2020-01-01 RX ADMIN — ALBUTEROL SULFATE 10 MG: 2.5 SOLUTION RESPIRATORY (INHALATION) at 15:56

## 2020-01-01 RX ADMIN — CEFEPIME HYDROCHLORIDE 1 G: 1 INJECTION, POWDER, FOR SOLUTION INTRAMUSCULAR; INTRAVENOUS at 17:26

## 2020-01-01 RX ADMIN — HEPARIN SODIUM 5000 UNITS: 5000 INJECTION INTRAVENOUS; SUBCUTANEOUS at 22:30

## 2020-01-01 RX ADMIN — ONDANSETRON 4 MG: 2 INJECTION INTRAMUSCULAR; INTRAVENOUS at 15:54

## 2020-01-01 RX ADMIN — MORPHINE SULFATE 4 MG: 4 INJECTION, SOLUTION INTRAMUSCULAR; INTRAVENOUS at 15:47

## 2020-01-01 RX ADMIN — SODIUM BICARBONATE: 84 INJECTION, SOLUTION INTRAVENOUS at 09:27

## 2020-01-01 RX ADMIN — MEROPENEM 500 MG: 500 INJECTION, POWDER, FOR SOLUTION INTRAVENOUS at 07:00

## 2020-01-01 RX ADMIN — INSULIN HUMAN 10 UNITS: 100 INJECTION, SOLUTION PARENTERAL at 16:53

## 2020-01-01 RX ADMIN — ACETAMINOPHEN 650 MG: 325 TABLET ORAL at 12:21

## 2020-01-01 RX ADMIN — MEROPENEM 500 MG: 500 INJECTION, POWDER, FOR SOLUTION INTRAVENOUS at 23:10

## 2020-01-01 RX ADMIN — LIDOCAINE HYDROCHLORIDE 8 ML: 10 INJECTION, SOLUTION EPIDURAL; INFILTRATION; INTRACAUDAL; PERINEURAL at 13:40

## 2020-01-01 RX ADMIN — ACETAMINOPHEN 650 MG: 325 TABLET ORAL at 20:49

## 2020-01-01 RX ADMIN — ACETAMINOPHEN 650 MG: 325 TABLET ORAL at 00:03

## 2020-01-01 RX ADMIN — DEXTROSE MONOHYDRATE 25 G: 25 INJECTION, SOLUTION INTRAVENOUS at 16:53

## 2020-01-01 RX ADMIN — PROMETHAZINE HYDROCHLORIDE 12.5 MG: 25 TABLET ORAL at 15:46

## 2020-01-01 RX ADMIN — ALBUMIN (HUMAN) 25 G: 0.25 INJECTION, SOLUTION INTRAVENOUS at 09:21

## 2020-01-01 RX ADMIN — VANCOMYCIN HYDROCHLORIDE 1250 MG: 10 INJECTION, POWDER, LYOPHILIZED, FOR SOLUTION INTRAVENOUS at 15:18

## 2020-01-01 RX ADMIN — DEXTROSE MONOHYDRATE 100 MG: 50 INJECTION, SOLUTION INTRAVENOUS at 13:56

## 2020-01-01 RX ADMIN — ALBUMIN (HUMAN) 25 G: 0.25 INJECTION, SOLUTION INTRAVENOUS at 20:51

## 2020-01-01 RX ADMIN — PROMETHAZINE HYDROCHLORIDE 12.5 MG: 25 INJECTION INTRAMUSCULAR; INTRAVENOUS at 02:39

## 2020-01-01 RX ADMIN — MEROPENEM 500 MG: 500 INJECTION, POWDER, FOR SOLUTION INTRAVENOUS at 22:33

## 2020-01-01 RX ADMIN — MIDAZOLAM 0.5 MG: 1 INJECTION INTRAMUSCULAR; INTRAVENOUS at 09:24

## 2020-01-01 RX ADMIN — PROMETHAZINE HYDROCHLORIDE 12.5 MG: 25 INJECTION INTRAMUSCULAR; INTRAVENOUS at 17:13

## 2020-01-01 RX ADMIN — LORAZEPAM 0.5 MG: 2 INJECTION INTRAMUSCULAR; INTRAVENOUS at 07:57

## 2020-01-01 RX ADMIN — Medication 10 ML: at 09:28

## 2020-01-01 RX ADMIN — Medication 10 ML: at 09:21

## 2020-01-01 RX ADMIN — MEROPENEM 500 MG: 500 INJECTION, POWDER, FOR SOLUTION INTRAVENOUS at 04:58

## 2020-01-01 RX ADMIN — Medication 10 ML: at 20:54

## 2020-01-01 RX ADMIN — ALBUMIN (HUMAN) 25 G: 0.25 INJECTION, SOLUTION INTRAVENOUS at 15:40

## 2020-01-01 RX ADMIN — DEXTROSE MONOHYDRATE 100 MG: 50 INJECTION, SOLUTION INTRAVENOUS at 20:05

## 2020-01-01 ASSESSMENT — PAIN DESCRIPTION - LOCATION
LOCATION: BACK
LOCATION: ABDOMEN

## 2020-01-01 ASSESSMENT — PAIN DESCRIPTION - PAIN TYPE
TYPE: CHRONIC PAIN;ACUTE PAIN
TYPE: ACUTE PAIN

## 2020-01-01 ASSESSMENT — PATIENT HEALTH QUESTIONNAIRE - PHQ9
1. LITTLE INTEREST OR PLEASURE IN DOING THINGS: 1
2. FEELING DOWN, DEPRESSED OR HOPELESS: 0
2. FEELING DOWN, DEPRESSED OR HOPELESS: 0
SUM OF ALL RESPONSES TO PHQ QUESTIONS 1-9: 1
SUM OF ALL RESPONSES TO PHQ QUESTIONS 1-9: 1
SUM OF ALL RESPONSES TO PHQ QUESTIONS 1-9: 0
SUM OF ALL RESPONSES TO PHQ9 QUESTIONS 1 & 2: 0
1. LITTLE INTEREST OR PLEASURE IN DOING THINGS: 0
SUM OF ALL RESPONSES TO PHQ9 QUESTIONS 1 & 2: 1
SUM OF ALL RESPONSES TO PHQ QUESTIONS 1-9: 0

## 2020-01-01 ASSESSMENT — PAIN SCALES - GENERAL
PAINLEVEL_OUTOF10: 0
PAINLEVEL_OUTOF10: 0
PAINLEVEL_OUTOF10: 5
PAINLEVEL_OUTOF10: 5
PAINLEVEL_OUTOF10: 0
PAINLEVEL_OUTOF10: 0
PAINLEVEL_OUTOF10: 5
PAINLEVEL_OUTOF10: 0
PAINLEVEL_OUTOF10: 6
PAINLEVEL_OUTOF10: 0
PAINLEVEL_OUTOF10: 3
PAINLEVEL_OUTOF10: 4
PAINLEVEL_OUTOF10: 6
PAINLEVEL_OUTOF10: 0
PAINLEVEL_OUTOF10: 0
PAINLEVEL_OUTOF10: 4
PAINLEVEL_OUTOF10: 4
PAINLEVEL_OUTOF10: 3
PAINLEVEL_OUTOF10: 0
PAINLEVEL_OUTOF10: 0

## 2020-01-01 ASSESSMENT — PAIN DESCRIPTION - DESCRIPTORS: DESCRIPTORS: ACHING

## 2020-01-01 ASSESSMENT — PAIN DESCRIPTION - ORIENTATION: ORIENTATION: MID;LOWER

## 2020-02-07 NOTE — PROGRESS NOTES
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    Obesity     PUD (peptic ulcer disease)     Skin cancer     Wheezing     since January           /60   Pulse 105   Wt 195 lb (88.5 kg)   SpO2 99%   BMI 29.65 kg/m²     General Appearance:  He appears chronically but not acutely ill   Head:  Normocephalic, without obvious abnormality, atraumatic   Neck: Supple, symmetrical,   Lungs:    Diminished breath sounds without wheezing    Chest Wall:  No tenderness or deformity   Heart:  Regular rate and rhythm, S1, S2 normal, no murmur, rub or gallop, edema is improved   Abdomen:   Soft, small umbilical hernia   Skin:  No rash   Lymph nodes: Cervical, supraclavicular  Adenopathy is absent   No components found for: CHLPL  Lab Results   Component Value Date    TRIG 94 04/20/2018    TRIG 101 09/30/2016    TRIG 95 09/23/2015     Lab Results   Component Value Date    HDL 43 04/20/2018    HDL 49 09/30/2016    HDL 56 09/23/2015     Lab Results   Component Value Date    LDLCALC 129 (H) 04/20/2018    LDLCALC 120 (H) 09/30/2016    LDLCALC 136 (H) 09/23/2015     Lab Results   Component Value Date    LABVLDL 19 04/20/2018    LABVLDL 20 09/30/2016    LABVLDL 19 09/23/2015     Lab Results   Component Value Date    CREATININE 0.7 (L) 07/29/2019         ASSESSMENT/PLAN[de-identified]        Diagnosis Orders   1. Malignant melanoma of skin of left upper extremity, including shoulder (HCC)  EKG 12 Lead   2.  Essential hypertension, benign        Electrocardiogram shows first-degree AV block at 220 ms unchanged from an EKG done in 2012 and 2018    EKG is otherwise unremarkable    His hypertension is controlled    Advised him that based on the EKG and examination today I see no contraindication to proceeding with this treatment

## 2020-06-22 NOTE — PATIENT INSTRUCTIONS
bathroom, if available. Animals: You should restrict contact with pets and other animals while you are sick with COVID-19, just like you would around other people. Although there have not been reports of pets or other animals becoming sick with COVID-19, it is still recommended that people sick with COVID-19 limit contact with animals until more information is known about the virus. When possible, have another member of your household care for your animals while you are sick. If you are sick with COVID-19, avoid contact with your pet, including petting, snuggling, being kissed or licked, and sharing food. If you must care for your pet or be around animals while you are sick, wash your hands before and after you interact with pets and wear a facemask. Call ahead before visiting your doctor  If you have a medical appointment, call the healthcare provider and tell them that you have or may have COVID-19. This will help the healthcare providers office take steps to keep other people from getting infected or exposed. Wear a facemask  You should wear a facemask when you are around other people (e.g., sharing a room or vehicle) or pets and before you enter a healthcare providers office. If you are not able to wear a facemask (for example, because it causes trouble breathing), then people who live with you should not stay in the same room with you, or they should wear a facemask if they enter your room. Cover your coughs and sneezes  Cover your mouth and nose with a tissue when you cough or sneeze. Throw used tissues in a lined trash can. Immediately wash your hands with soap and water for at least 20 seconds or, if soap and water are not available, clean your hands with an alcohol-based hand  that contains at least 60% alcohol.   Clean your hands often  Wash your hands often with soap and water for at least 20 seconds, especially after blowing your nose, coughing, or sneezing; going to the bathroom; and before eating or preparing food. If soap and water are not readily available, use an alcohol-based hand  with at least 60% alcohol, covering all surfaces of your hands and rubbing them together until they feel dry. Soap and water are the best option if hands are visibly dirty. Avoid touching your eyes, nose, and mouth with unwashed hands. Avoid sharing personal household items  You should not share dishes, drinking glasses, cups, eating utensils, towels, or bedding with other people or pets in your home. After using these items, they should be washed thoroughly with soap and water. Clean all high-touch surfaces everyday  High touch surfaces include counters, tabletops, doorknobs, bathroom fixtures, toilets, phones, keyboards, tablets, and bedside tables. Also, clean any surfaces that may have blood, stool, or body fluids on them. Use a household cleaning spray or wipe, according to the label instructions. Labels contain instructions for safe and effective use of the cleaning product including precautions you should take when applying the product, such as wearing gloves and making sure you have good ventilation during use of the product. Monitor your symptoms  Seek prompt medical attention if your illness is worsening (e.g., difficulty breathing). Before seeking care, call your healthcare provider and tell them that you have, or are being evaluated for, COVID-19. Put on a facemask before you enter the facility. These steps will help the healthcare providers office to keep other people in the office or waiting room from getting infected or exposed. Ask your healthcare provider to call the local or state health department. Persons who are placed under active monitoring or facilitated self-monitoring should follow instructions provided by their local health department or occupational health professionals, as appropriate. When working with your local health department check their available hours.   If you have a medical emergency and need to call 911, notify the dispatch personnel that you have, or are being evaluated for COVID-19. If possible, put on a facemask before emergency medical services arrive. Discontinuing home isolation  Patients with confirmed COVID-19 should remain under home isolation precautions until the risk of secondary transmission to others is thought to be low. The decision to discontinue home isolation precautions should be made on a case-by-case basis, in consultation with healthcare providers and state and local health departments. Thank you for enrolling in [address] Please follow the instructions below to securely access your online medical record. [employer] allows you to send messages to your doctor, view your test results, renew your prescriptions, schedule appointments, and more. How Do I Sign Up? 1. In your Internet browser, go to [email or web address]  2. Click on the Sign Up Now link in the Sign In box. You will see the New Member Sign Up page. 3. Enter your [employer] Access Code exactly as it appears below. You will not need to use this code after youve completed the sign-up process. If you do not sign up before the expiration date, you must request a new code. [employer] Access Code: [email or web address]  Expires: 8/6/2020 10:03 AM    4. Enter your Social Security Number ([id number]) and Date of Birth (mm/dd/yyyy) as indicated and click Submit. You will be taken to the next sign-up page. 5. Create a [employer] ID. This will be your [employer] login ID and cannot be changed, so think of one that is secure and easy to remember. 6. Create a [employer] password. You can change your password at any time. 7. Enter your Password Reset Question and Answer. This can be used at a later time if you forget your password. 8. Enter your e-mail address. You will receive e-mail notification when new information is available in [address] 9. Click Sign Up.  You can now view your

## 2020-06-25 NOTE — PROGRESS NOTES
Chief Complaint   Patient presents with    Cancer     having biopsy of abd per pt        Uriel Cabrera 79 y.o. male is here for follow-up of hypertension and melanoma      He continues under the regular care of an oncologist he brings in laboratory done which I reviewed, there are no significant findings    On a recent scan he was found to have increasing adenopathy in his abdomen. He is scheduled for a biopsy later this week to see if this is metastatic melanoma or whether this may be a another primary. He complains of early satiety. He is having a difficult time eating. He continues to lose weight. 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 He states at this point time he is nearly unable to do so and when he does not respond is only able to drive and cannot administer to patients. Current Outpatient Medications on File Prior to Visit   Medication Sig Dispense Refill    predniSONE (DELTASONE) 10 MG tablet Take 10 mg by mouth daily      Nivolumab (OPDIVO IV) Infuse intravenously every 21 days Indications: plus Yervoy      Clobetasol Prop Oint-Coal Tar (CLOBETAPLUS OINTMENT EX) Apply topically as needed      acetaminophen (TYLENOL) 500 MG tablet Take 500 mg by mouth every 6 hours as needed for Pain      Ca Carbonate-Mag Hydroxide (ANTACID EXTRA STRENGTH) 675-135 MG CHEW Take by mouth as needed      Skin Protectants, Misc. (EUCERIN) cream Apply topically as needed for Dry Skin Apply topically as needed.  Misc Natural Products (GLUCOSAMINE CHONDROITIN MSM) TABS Take by mouth daily as needed       triamcinolone (KENALOG) 0.1 % lotion Apply topically as needed Apply topically 3 times daily.       diphenhydrAMINE (BENADRYL) 25 MG capsule Take 25 mg by mouth as needed for Itching No current facility-administered medications on file prior to visit. 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    Obesity     PUD (peptic ulcer disease)     Skin cancer     Wheezing     since January           /80   Pulse 92   Temp 99.5 °F (37.5 °C)   Wt 174 lb (78.9 kg)   SpO2 99%   BMI 26.46 kg/m²     General Appearance:   Appears chronically ill   Head:  Normocephalic, without obvious abnormality, atraumatic   Neck: Supple, symmetrical, trachea midline, no adenopathy, thyroid: not enlarged, symmetric, no tenderness/mass/nodules,     Lungs:    Diminished breath sounds without wheezing    Chest Wall:  No tenderness or deformity   Heart:  Regular rate and rhythm, S1, S2 normal, no murmur, rub or gallop, edema is improved   Abdomen:   Soft, non-tender, bowel sounds active all four quadrants,  no masses, no organomegaly genitourinary no hernias, no testicular masses    Skin:  No rash     affect depressed   No components found for: CHLPL  Lab Results   Component Value Date    TRIG 94 04/20/2018    TRIG 101 09/30/2016    TRIG 95 09/23/2015     Lab Results   Component Value Date    HDL 43 04/20/2018    HDL 49 09/30/2016    HDL 56 09/23/2015     Lab Results   Component Value Date    LDLCALC 129 (H) 04/20/2018    LDLCALC 120 (H) 09/30/2016    LDLCALC 136 (H) 09/23/2015     Lab Results   Component Value Date    LABVLDL 19 04/20/2018    LABVLDL 20 09/30/2016    LABVLDL 19 09/23/2015     Lab Results   Component Value Date    CREATININE 1.4 (H) 06/25/2020         ASSESSMENT/PLAN[de-identified]        Diagnosis Orders   1. Malignant melanoma of overlapping sites ([facility]     2. Essential hypertension, benign     3. Weight loss     4. Early satiety        I discussed the situation with the patient.   For the first time he does seem to have some awareness that he has advanced disease and has a limited life expectancy. In the past I never got the feeling that he truly grasped the severity of his underlying condition. Discussed nutritional supplementation, regular small feedings    The patient is aware that if biopsies of the adenopathy present within the abdomen show evidence of metastatic melanoma he may have exhausted all current treatment and I advised him that comfort measures may be the only thing we would have to offer    He had been maintained on prednisone. He has been tapering this. I advised him that prednisone would be beneficial for him for stimulation of appetite and perhaps improving his overall state of wellbeing, he is well aware of the potential side effects of the medication. If he has evidence of metastatic melanoma to the abdomen would consider referral to hospice.     27 minutes greater than 50% counseling

## 2020-10-19 PROBLEM — R06.00 DYSPNEA: Status: ACTIVE | Noted: 2020-01-01

## 2020-10-19 NOTE — PROGRESS NOTES
MD Rd Ng MD Simona American, MD                Office: [phone]                      Fax: [phone]               [email or web address]                   Nephrology consult received from ER- Dr Annamaria Rivas for ATUL, hyperkalemia, acidosis, hyponatremia. Brief Summary Note -- full consult report will follow. Chart reviewed. Discussed with Dr Annamaria Rivas. Thank you for allowing us to participate in this patient's care. Please do not hesitate to contact me for any questions/concerns. Radha Lugo MD  [facility] [address] [place] [address]   [phone] or [address] [employer]    =====================================================    Brief/Initial Impression:   ATUL, severe, BL ~ 0.6 - ~ 2 wks ago   MM, ascites, carcinomatosis   Hyperkalemia 6.6   Hyponatremia 129   Acidosis - 12   Azotemia - BUN 75. BP in to 70s    Stop LR, change to NS for IVF resuscitation  Then start bicarb infusion  Likely not a long term dialysis candidate d/to poor oncology prognosis. No K-binder , till bowel obstruction r/o. F/u after I/D bicarb    Follow CT w/o IVC   Check UA, lytes, blood gas  Recheck labs in 2 hours  High risk, f/u BP, might need ICU admission. Active Problems:    * No active hospital problems. *  Resolved Problems:    * No resolved hospital problems. *          Labs reviewed by me     CBC:   Recent Labs     10/19/20  1416   WBC 12.0*   HGB 14.5   HCT 43.9   MCV 86.6        BMP:   Recent Labs     10/19/20  1416   *   K 6.6*      CO2 12*   BUN 75*   CREATININE 3.3*     Magnesium:   Lab Results   Component Value Date    MG 1.80 07/23/2019    MG 1.70 07/22/2019    MG 2.10 05/02/2019       Arterial Blood Gasses  No results for input(s): PH, PCO2, PO2 in the last 72 hours.     Invalid input(s): Jessie Garcia    UA:No results for input(s): NITRITE, COLORU, PHUR, LABCAST, WBCUA, RBCUA, MUCUS, TRICHOMONAS, YEAST, BACTERIA, CLARITYU, Enmanuel Gavino, UROBILINOGEN, BILIRUBINUR, BLOODU, GLUCOSEU, AMORPHOUS in the last 72 hours. Invalid input(s): Carrie Nissen    LIVER PROFILE: No results for input(s): AST, ALT, LIPASE, BILIDIR, BILITOT, ALKPHOS in the last 72 hours. Invalid input(s): AMYLASE,  ALB  PT/INR:    Lab Results   Component Value Date    PROTIME 12.4 06/25/2020    PROTIME 11.9 06/08/2018    PROTIME 11.6 03/11/2016    INR 1.07 06/25/2020    INR 1.04 06/08/2018    INR 1.02 03/11/2016     PTT:    Lab Results   Component Value Date    APTT 30.0 06/25/2020    APTT 32.0 06/08/2018    APTT 31.8 03/11/2016     ROBERT:  No results found for: ANATITER, ROBERT            RADIOLOGY:    Ct Abdomen Pelvis Wo Contrast    Result Date: 10/12/2020  Site: Young Velazquez #: [id number] #: [id number]: Aditi Cobbut #: [de-identified] #: [id number] #: [id number]: CT ABDOMEN PELVIS WO CONTRASTExam Date/Time: 10/12/2020 09:00 AMAdmitting Diagnosis: Transcribed OrderReason for Exam: Transcribed Order Dictated by: Yulissa Duncan RUSLAN: 10/12/2020 11:24 AMT: This document is confidential medical information. Unauthorized disclosure or use of this information is prohibited by law. If you are not the intended recipient of this document, please advise us by calling immediately [phone]. Impression/Conclusion below HISTORY: Transcribed Order, Secondary malignant neoplasm of left lung ([address] Melanoma of left upper arm ([address] Secondary malignant neoplasm of left lung; Malignant melanoma of left upper limb, including shoulder COMPARISON:  September 17, 2020 and June 11, 2020 TECHNIQUE: Noncontrast multiplanar CT images of the abdomen and pelvis. Images were obtained using automated exposure control.  No IV contrast was administered oral Omnipaque, 100 mL was administered NOTE:  If there are questions about the content of this report, please contact [facility] [place]ology by calling [phone] FINDINGS: LOWER CHEST:  Small effusion mild peripheral basilar airspace disease as described on chest CT. Coronary artery calcification LIVER:  Solid organ evaluation is limited by the lack of IV contrast. No focal lesion is suggested GALLBLADDER/BILE DUCTS:  Present PANCREAS:  Grossly unremarkable SPLEEN:  Benign calcifications ADRENALS:  Poorly distinguished there may be a right adrenal nodule. KIDNEYS/URETERS:  Known renal cysts. Nonobstructing intrarenal calculi. No change in renal contour or development of hydronephrosis GI TRACT:  Distention of proximal small bowel. Transition is likely in the left mid abdomen. Left colonic diverticulosis. VESSELS:  Patency cannot be determined without IV contrast LYMPH NODES: Extensive periaortic, pericaval, and mesenteric adenopathy. Size and extent adenopathy has significantly increased since June. It is more difficult to determine if there is a change in the size of the nodes when compared with September. ABD WALL:  Unremarkable PELVIS:  Unremarkable BONES:  Unremarkable OTHER:  Marked increase in ascitic fluid throughout the abdomen. Increased edema and thickening  of the omentum that may represent worsening carcinomatosis versus edema from inflow blockage. IMPRESSION: Development of massive ascites and extensive changes consistent with carcinomatosis. Extensive retroperitoneal and mesenteric adenopathy remains. It is difficult to determine if this has worsened since September. It is definitely worse when compared with June. . Distention of the proximal small bowel and decompression of the distal small bowel. Transition is likely in the left abdomen. There is gas in the colon. This may represent a partial small bowel obstruction.  Report was marked significant in hospital information system for physician notification SIGNED BY: Carmen Garcia MD on 10/12/2020 11:21 AM  [facility]: [phone]        Ct Chest Wo Contrast    Result Date: 10/12/2020  Site: Evan Ronen #: [id number] #: abdominal adenopathy. Nonobstructing renal calculi. Findings and described in more detail on corresponding abdominal BONES:  No suspicious osteolytic lesion OTHER:  None  IMPRESSION: Stable adenopathy adjacent to the right brachiocephalic vessels and along the descending aorta since September. New small right effusion. Right basilar reticulation and coarse stranding persists. There is increased patchy peripheral airspace disease in the right costophrenic angle. Development of large amount of ascitic fluid with changes suggestive of carcinomatosis in the abdomen. This is better described on corresponding abdominal CT SIGNED BY: Arnoldo Villalba MD on 10/12/2020 12:52 PM    [facility] [phone] -  [address]: [phone]          Xr Chest Portable    Result Date: 10/19/2020  EXAMINATION: ONE XRAY VIEW OF THE CHEST 10/19/2020 2:37 pm COMPARISON: Chest CT September 17, 2020. HISTORY: ORDERING SYSTEM PROVIDED HISTORY: SOB, hypotension, hx lung cancer TECHNOLOGIST PROVIDED HISTORY: Reason for exam:->SOB, hypotension, hx lung cancer Reason for Exam: Shortness of Breath (pt states he has lung CA, increase SOB, worsening today. pt was suppose to be a direct admit but no beds were available. pt currently on PO chemo. pt having abd distention for several days) Acuity: Chronic Type of Exam: Ongoing FINDINGS: There is mild elevation of the right hemidiaphragm. There is questionable small right-sided pleural effusion. There is mild right basilar airspace disease. Calcified granulomas again seen in the left lower lung. No pneumothorax. Cardiac and mediastinal contours are without acute process. No acute osseous abnormality. Right basilar airspace disease, atelectasis versus pneumonia with questionable small right-sided pleural effusion. Imaging Results.   Chest X Ray reviwed by me          Electronically Signed: Vipul Rios MD 10/19/2020 3:09 PM

## 2020-10-19 NOTE — H&P
Hospital Medicine History & Physical      PCP: Fam Brooks MD    Date of Admission: 10/19/2020    Date of Service: Pt seen/examined on 10/19/2020  2:23 PM and   Admitted to Inpatient with expected LOS greater than two midnights due to medical therapy.        Chief Complaint: Dyspnea fatigue abdominal distention    History Of Present Illness:    79 y.o. male with PMHx significant for melanoma with mets multiple rounds of chemotherapy increased metastatic burden in the abdomen admitted to the hospital with increasing fatigue unable to tolerate p.o. and increasing abdominal distention  History obtained from patient and his wife who is at the bedside patient requested I obtain majority of the history from the wife  He complains of increasing abdominal distention over the last 3 weeks to the point that he can barely do anything  Minimal exertion leads to fatigue and now he is short of breath at rest  He denies any chest pain  Complains of generalized abdominal distention  Per his wife has not been able to eat much and his oral intake has deteriorated over the last 3 weeks  3 episodes of nonbloody diarrhea  Was sent in by oncologist for concerns of bowel obstruction  No cough  No fevers  No swelling of lower extremities      Past Medical History:          Diagnosis Date    Allergic     Arthritis     Hypertension     Lung mass     left    Melanoma ([address]     left back lyndsey geraldine; mets to lung    Obesity     PUD (peptic ulcer disease)     Skin cancer     Wheezing     since January       Past Surgical History:          Procedure Laterality Date    APPENDECTOMY      BRONCHOSCOPY  3/11/2016    biopsy    BRONCHOSCOPY N/A 06/08/2018    EYE SURGERY      cataracts with lens placement bilat    LUNG SURGERY Left     65% of left lung tumor removed    TONSILLECTOMY      UPPER GASTROINTESTINAL ENDOSCOPY N/A 5/2/2019    EGD BIOPSY performed by Elise Veliz MD at [address] [facility] ENDOSCOPY N/A 7/9/2019    EGD BIOPSY performed by Celina Terrazas MD at [address]       Medications Prior to Admission:      Prior to Admission medications    Medication Sig Start Date End Date Taking? Authorizing Provider   aspirin 325 MG tablet Take 325 mg by mouth daily   Yes Historical Provider, MD   predniSONE (DELTASONE) 10 MG tablet Take 10 mg by mouth daily   Yes Historical Provider, MD   Clobetasol Prop Oint-Coal Tar (CLOBETAPLUS OINTMENT EX) Apply topically as needed   Yes Historical Provider, MD   acetaminophen (TYLENOL) 500 MG tablet Take 500 mg by mouth every 6 hours as needed for Pain   Yes Historical Provider, MD   Ca Carbonate-Mag Hydroxide (ANTACID EXTRA STRENGTH) 675-135 MG CHEW Take by mouth as needed   Yes Historical Provider, MD   Skin Protectants, Misc. (EUCERIN) cream Apply topically as needed for Dry Skin Apply topically as needed. Yes Historical Provider, MD   Misc Natural Products ([address] MSM) TABS Take by mouth daily as needed    Yes Historical Provider, MD   triamcinolone (KENALOG) 0.1 % lotion Apply topically as needed Apply topically 3 times daily. Yes Historical Provider, MD   diphenhydrAMINE (BENADRYL) 25 MG capsule Take 25 mg by mouth as needed for Itching   Yes Historical Provider, MD   Nivolumab (OPDIVO IV) Infuse intravenously every 21 days Indications: plus Winter    Historical Provider, MD       Allergies: Iodides    Social History:      The patient currently lives at home     TOBACCO:   reports that he quit smoking about 4 years ago. His smoking use included cigars. He has a 13.75 pack-year smoking history. He has never used smokeless tobacco.  ETOH:   reports current alcohol use of about 1.0 standard drinks of alcohol per week. Family History:      Reviewed in detail and negative for DM, CAD, Cancer, CVA.  Positive as follows:        Problem Relation Age of Onset    Emphysema Mother     Cancer Father     Cancer Brother     Obesity Brother        REVIEW OF SYSTEMS:   Pertinent positives as noted in the HPI. All other systems reviewed and negative. PHYSICAL EXAM:    BP (!) 84/39   Pulse 151   Temp 98 °F (36.7 °C) (Infrared)   Resp 30   Ht 5' 8\" (1.727 m)   Wt 160 lb (72.6 kg)   SpO2 100%   BMI 24.33 kg/m²     General appearance: Looks ill tired  HEENT:  Normal cephalic, atraumatic without obvious deformity. Pupils equal, round, and reactive to light. Extra ocular muscles intact. Conjunctivae/corneas clear. Pallor positive  Neck: Supple, with full range of motion. No jugular venous distention. Trachea midline. Respiratory: Creased air entry at both lung bases  Cardiovascular:  Regular rate and rhythm with normal S1/S2 without MURMUR, rubs or gallops. Abdomen: Distended fluid positive mild generalized tenderness no guarding no rigidity  Musculoskeletal:  No clubbing, cyanosis or EDEMA bilaterally. Full range of motion without deformity. Skin: Skin color, texture, turgor normal.  No rashes or lesions. Neurologic:  Neurovascularly intact without any focal sensory/motor deficits. Cranial nerves: II-XII intact, grossly non-focal.  Psychiatric:  Alert and oriented, thought content appropriate, normal insight  Capillary Refill: Brisk,< 3 seconds   Peripheral Pulses: +2 palpable, equal bilaterally       Labs:     Recent Labs     10/19/20  1416   WBC 12.0*   HGB 14.5   HCT 43.9        Recent Labs     10/19/20  1416   *   K 6.6*      CO2 12*   BUN 75*   CREATININE 3.3*   CALCIUM 9.0     No results for input(s): AST, ALT, BILIDIR, BILITOT, ALKPHOS in the last 72 hours. No results for input(s): INR in the last 72 hours. No results for input(s): Ramy Hayden in the last 72 hours.     Urinalysis:      Lab Results   Component Value Date    NITRU Neg 08/25/2016    BLOODU Neg 08/25/2016    SPECGRAV 1.008 08/25/2016    GLUCOSEU Neg 08/25/2016       Radiology:     CXR: I have reviewed the CXR with the following interpretation: Right basal pleural effusion    EKG:  I have reviewed the EKG with the following interpretation: Poor baseline but looks like sinus rhythm    CT ABDOMEN PELVIS WO CONTRAST Additional Contrast? None   Final Result   Dilated loops of small bowel throughout the anterior, central, and midline   abdomen suspicious for obstruction, though evaluation for a discrete   transition point is limited by the absence of contrast and the volume of   intraperitoneal ascites. Extensive metastatic disease with diffuse peritoneal nodularity presumed on   the basis of carcinomatosis, new from prior comparison evaluation 5 months   prior. .      Pathologic lymphadenopathy throughout the abdomen and pelvis, slightly   decreased in bulk in the retroperitoneum and increased throughout the   mesentery relative to examination from 5 months prior. The adenopathy within   the partially imaged lower chest is grossly stable. Large volume simple ascites, new from prior comparison. New small simple right pleural effusion. Asymmetric enlargement and soft tissue stranding within the right pectoralis   subcutaneous soft tissues. Correlate with physical exam for cellulitis or   other focal abnormality. XR CHEST PORTABLE   Final Result   Right basilar airspace disease, atelectasis versus pneumonia with   questionable small right-sided pleural effusion.              ASSESSMENT/PLAN:    Active Hospital Problems    Diagnosis Date Noted    Dyspnea [R06.00] 10/19/2020       Acute Medical Issues Being Addressed:    80-year-old admitted with abdominal distention and fatigue and shortness of breath    Abdominal distention fatigue and shortness of breath secondary to increasing ascites with underlying metastatic melanoma with increasing abdominal mets burden with probable underlying small bowel obstruction  Keep n.p.o.  General surgery consult  Strict intake and output  Will get paracentesis tomorrow  Currently

## 2020-10-19 NOTE — ED PROVIDER NOTES
lung    Obesity     PUD (peptic ulcer disease)     Skin cancer     Wheezing     since January     Past surgical history:   Past Surgical History:   Procedure Laterality Date    APPENDECTOMY      BRONCHOSCOPY  3/11/2016    biopsy    BRONCHOSCOPY N/A 06/08/2018    EYE SURGERY      cataracts with lens placement bilat    LUNG SURGERY Left     65% of left lung tumor removed    TONSILLECTOMY      UPPER GASTROINTESTINAL ENDOSCOPY N/A 5/2/2019    EGD BIOPSY performed by Cathryn Rivas MD at [address] [place] [address] 7/9/2019    EGD BIOPSY performed by Cathryn Rivas MD at [facility] medications:   Previous Medications    ACETAMINOPHEN (TYLENOL) 500 MG TABLET    Take 500 mg by mouth every 6 hours as needed for Pain    ASPIRIN 325 MG TABLET    Take 325 mg by mouth daily    CA CARBONATE-MAG HYDROXIDE (ANTACID EXTRA STRENGTH) 675-135 MG CHEW    Take by mouth as needed    CLOBETASOL PROP OINT-COAL TAR (CLOBETAPLUS OINTMENT EX)    Apply topically as needed    DIPHENHYDRAMINE (BENADRYL) 25 MG CAPSULE    Take 25 mg by mouth as needed for Itching    MISC NATURAL PRODUCTS (GLUCOSAMINE CHONDROITIN MSM) TABS    Take by mouth daily as needed     NIVOLUMAB (OPDIVO IV)    Infuse intravenously every 21 days Indications: plus [place]    PREDNISONE (DELTASONE) 10 MG TABLET    Take 10 mg by mouth daily    SKIN PROTECTANTS, MISC. (EUCERIN) CREAM    Apply topically as needed for Dry Skin Apply topically as needed. TRIAMCINOLONE (KENALOG) 0.1 % LOTION    Apply topically as needed Apply topically 3 times daily. Social history:  reports that he quit smoking about 4 years ago. His smoking use included cigars. He has a 13.75 pack-year smoking history. He has never used smokeless tobacco. He reports current alcohol use of about 1.0 standard drinks of alcohol per week.     Family history:    Family History   Problem Relation Age of Onset    Emphysema Mother     Cancer Father  Cancer Brother     Obesity Brother          Exam  ED Triage Vitals [10/19/20 1352]   BP Temp Temp Source Pulse Resp SpO2 Height Weight   (!) 73/54 98 °F (36.7 °C) Infrared 88 24 96 % 5' 8\" (1.727 m) 160 lb (72.6 kg)     Nursing note and vitals reviewed. Constitutional: Well developed, well nourished. Non-toxic in appearance. Appears uncomfortable, in no acute distress. HENT:      Head: Normocephalic and atraumatic. Ears: External ears normal.      Nose: Nose normal.     Mouth: Membrane mucosa tacky and pink. Eyes: Anicteric sclera. No discharge. Neck: Supple. Trachea midline. Cardiovascular: RRR; no murmurs, rubs, or gallops. Pulmonary/Chest: Effort normal. No respiratory distress. CTAB. No stridor. No wheezes. Scattered rhonchi. Abdominal: Softly distended with tympany, diffusely tender to palpation most severe in left lower quadrant and periumbilical region. No rebound, no rigidity, no guarding. Musculoskeletal: Moves all extremities. No gross deformity. Neurological: Alert and orientedx4. Face symmetric. Speech is clear. Skin: Warm and dry. No rash. Psychiatric: Normal mood and affect. Behavior is normal.    Procedures      EKG    EKG was reviewed by emergency department physician in the absence of a cardiologist    Narrow complex sinus rhythm, rate 88, normal axis, normal IA and QRS intervals, normal Qtc, no ST elevations or depressions, normal t-wave morphology, impression NSR, no STEMI, read is limited by baseline artifact unable to resolve after repeat attempt and with lead adjustment. Radiology  CT ABDOMEN PELVIS WO CONTRAST Additional Contrast? None   Final Result   Dilated loops of small bowel throughout the anterior, central, and midline   abdomen suspicious for obstruction, though evaluation for a discrete   transition point is limited by the absence of contrast and the volume of   intraperitoneal ascites.       Extensive metastatic disease with diffuse peritoneal nodularity presumed on   the basis of carcinomatosis, new from prior comparison evaluation 5 months   prior. .      Pathologic lymphadenopathy throughout the abdomen and pelvis, slightly   decreased in bulk in the retroperitoneum and increased throughout the   mesentery relative to examination from 5 months prior. The adenopathy within   the partially imaged lower chest is grossly stable. Large volume simple ascites, new from prior comparison. New small simple right pleural effusion. Asymmetric enlargement and soft tissue stranding within the right pectoralis   subcutaneous soft tissues. Correlate with physical exam for cellulitis or   other focal abnormality. XR CHEST PORTABLE   Final Result   Right basilar airspace disease, atelectasis versus pneumonia with   questionable small right-sided pleural effusion.              Labs  Results for orders placed or performed during the hospital encounter of 09/60/36   Basic Metabolic Panel   Result Value Ref Range    Sodium 129 (L) 136 - 145 mmol/L    Potassium 6.6 (HH) 3.5 - 5.1 mmol/L    Chloride 100 99 - 110 mmol/L    CO2 12 (LL) 21 - 32 mmol/L    Anion Gap 17 (H) 3 - 16    Glucose 118 (H) 70 - 99 mg/dL    BUN 75 (H) 7 - 20 mg/dL    CREATININE 3.3 (H) 0.8 - 1.3 mg/dL    GFR Non-African American 18 (A) >60    GFR  22 (A) >60    Calcium 9.0 8.3 - 10.6 mg/dL   CBC Auto Differential   Result Value Ref Range    WBC 12.0 (H) 4.0 - 11.0 K/uL    RBC 5.06 4.20 - 5.90 M/uL    Hemoglobin 14.5 13.5 - 17.5 g/dL    Hematocrit 43.9 40.5 - 52.5 %    MCV 86.6 80.0 - 100.0 fL    MCH 28.7 26.0 - 34.0 pg    MCHC 33.2 31.0 - 36.0 g/dL    RDW 18.2 (H) 12.4 - 15.4 %    Platelets 144 630 - 068 K/uL    MPV 6.6 5.0 - 10.5 fL    Neutrophils % 95.1 %    Lymphocytes % 1.6 %    Monocytes % 3.1 %    Eosinophils % 0.1 %    Basophils % 0.1 %    Neutrophils Absolute 11.4 (H) 1.7 - 7.7 K/uL    Lymphocytes Absolute 0.2 (L) 1.0 - 5.1 K/uL    Monocytes Absolute 0.4 0.0 - 1.3 K/uL Eosinophils Absolute 0.0 0.0 - 0.6 K/uL    Basophils Absolute 0.0 0.0 - 0.2 K/uL   Lactate, Sepsis   Result Value Ref Range    Lactic Acid, Sepsis 3.5 (H) 0.4 - 1.9 mmol/L   EKG 12 Lead   Result Value Ref Range    Ventricular Rate 88 BPM    Atrial Rate 87 BPM    QRS Duration 72 ms    Q-T Interval 340 ms    QTc Calculation (Bazett) 411 ms    R Axis -6 degrees    T Axis -3 degrees    Diagnosis       Technically poor tracingprobable sinus rhythmLow voltage QRSprobable  Septal infarct , age undeterminedAbnormal ECGConfirmed by Traci Phan MD, Cisco Chamberlain ([id number]) on 10/19/2020 4:26:22 PM       Screenings           MDM and ED Course    Patient afebrile and nontoxic. No distress. EKG is technically limited, no clear ischemic changes, patient without chest pain, troponin normal, ACS malignant dysrhythmia is not evident. No peritoneal signs on abdominal exam, however symptoms and distention are concerning for bowel obstruction. CT scan did show SBO and ascites, no evidence of perforation or acute intra-abdominal infection. Laboratory work-up with acute renal failure and hyperkalemia of 6.6. Difficult to tell for certain if EKG changes, however patient does not report any symptoms consistent with hyperkalemia at this time. Was treated emergently with dextrose, insulin and high-dose albuterol. Was given fluid resuscitation for hypotension, hypotension did resolve after 30 cc/kg of normal saline. No indication for vasopressors at this time. Chest x-ray with possible pneumonia, patient without fevers or productive cough however does report shortness of breath and felt prudent to treat, given cefepime and vancomycin. Patient is not in septic shock. I discussed case with Dr. Kat Jones for nephrology, agrees with current management plan and will provide further treatment and monitoring for patient's hyperkalemia. Dr. Kat Jones but did evaluate patient at bedside.   Case also discussed with Dr. Colleen Reyes for oncology, agrees with current management plan and plan to admit. No further recommendations at this time. I discussed findings at length with patient and his spouse, recommended nasogastric tube decompression which patient was agreeable to. Overall felt to likely be poor surgical candidate. Case discussed with internal medicine team and will admit for further evaluation and care. Patient alert and hemodynamically stable at time of admission. On frequent reevaluations at bedside I did offer pain medication which patient continually declined, stating his pain is mild. Critical Care Time    Upon my evaluation, this patient had a high probability of imminent or life-threatening deterioration due to hypotension, acute renal failure with critical electrolyte derangement requiring emergent treatment which required my direct attention, intervention, and personal management. Specialist consultation with nephrology and oncology was obtained. The total critical care time personally spent while evaluating and treating this patient was 40 minutes exclusive of any time spent doing separately billable procedures. This includes time at the bedside, data interpretation, medication management, monitoring for potential decompensation and physician consultation. Specifics of interventions taken and potentially life-threatening diagnostic considerations are listed above in the medical decision making. Final Impression  1. Small bowel obstruction ([address]    2. Acute renal failure, unspecified acute renal failure type ([address]    3. Hyperkalemia    4. Hypotension, unspecified hypotension type    5. Metastatic melanoma (HCC)        Blood pressure (!) 108/57, pulse 84, temperature 98 °F (36.7 °C), temperature source Infrared, resp. rate 27, height 5' 8\" (1.727 m), weight 160 lb (72.6 kg), SpO2 99 %. Disposition:  DISPOSITION Admitted 10/19/2020 05:49:08 PM      Patient Referrals:  No follow-up provider specified.     Discharge Medications:  New Prescriptions    No medications on file       Discontinued Medications:  Discontinued Medications    No medications on file       This chart was generated using the [address] [place] [address] [address]  dictation system. I created this record but it may contain dictation errors given the limitations of this technology.     Marga Diaz DO (electronically signed)  Attending Emergency Physician       Marga Diaz DO  10/19/20 [address], DO  10/20/20 9898

## 2020-10-19 NOTE — ED NOTES
Hydrocortisone sent to ICU via tube station     Ana Devine, [facility]  10/19/20 [id number]

## 2020-10-19 NOTE — PROGRESS NOTES
Pt. Admitted to ICU Bed # 16 from ED now. Report to Miki. Pt. Resting in bed on left side now. New skin tear to left [facility] area per ED report. Lab sent per ED at 1900 this reported to [facility]. Pt. Declined NG at this time, pt. States hospitalist stated it can wait until tomorrow. Pt. Is concerned with getting NG. Abdomen is largely distended and tight.  Wife called now to update per her request.

## 2020-10-19 NOTE — CONSULTS
MD Marian Weeks MD Arlice Slice, MD               Office: [phone]                      Fax: [phone]             [facility]                   NEPHROLOGY INITIAL CONSULT NOTE:     PATIENT NAME: Anthony Thibodeaux  : 1940  MRN: [id number]  REASON FOR CONSULT: I am asked to see this patient in consultation for my opinion regarding management of Acute Kidney Injury. My recommendations will be communicated by way of shared medical record. PRIMARY CARE PHYSICIAN: Ignacio Simmons MD      RECOMMENDATIONS:   - agressive IVF resuscitation,    - stop LR as w/ hyperK, hypoNa   - change to NS,    - after IVF resuscitation w/ ~ 2L NS -> start bicarb infusion  - no K-binder use, as need to r/o bowel obstruction  - s/p I/D + bicarb  - f/u recheck renal panel in 2 hr   - get UA, urine lytes, blood gas,    - consult oncology, hold Nivolumab     - monitor PVR w/ bladder scan for bonds insertion need. - no need for dialysis , but at higher risk for decompensation, needing closer monitoring   - poor candidate for long term dialysis w/ poor oncology prognosis. - although pt is interested in dialysis. - ICU admission tonight   - needs palliative consult, for [facility]     D/C plan: unclear,       IMPRESSION:       ATUL (on CKD-3A): Oliguric, severe   - BL Scr- 0.6 ---> 3.3 on admission  : Etiology of ATUL - pre-renal / ATN : w/ hypotension 70s/50s    - other differentials: r/o obstruction   - Renal imaging: CT on 10/12: Development of massive ascites and extensive changes consistent with carcinomatosis.      Associated problems:   - Azotemia: severe in to 70s - pre-renal   - Electrolytes: K: 6.6 - hyperkalemia    - Volume status: hypo-volemic :   : Na: hyponatremia - moderate   : BP: Hypotension 70/50s in ER -   - Acid-Base: severe, 12, non-AG - metabolic, LA 3.5 on admission  - Anemia: none      : other supportive care :   - Check daily renal function panel with 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    Obesity     PUD (peptic ulcer disease)     Skin cancer     Wheezing     since January      Presents with Shortness of Breath (pt states he has lung CA, increase SOB, worsening today. pt was suppose to be a direct admit but no beds were available. pt currently on PO chemo. pt having abd distention for several days)    Admitted with ATUL, severe,    Found to have atul , so we are called for that. ATUL, severe, BL ~ 0.6 - ~ 2 wks ago   W/ h/o MM, ascites, carcinomatosis   Labs showed: Hyperkalemia 6.6 Hyponatremia 129 Acidosis - 12 Azotemia - BUN 75. BP in to 70s in ER - giving aggressive IVF resusciation. Due to severity of presentation, I urgently evaluated pt in ER. Regarding: ATUL on CKD  · Duration: acute on chronic  · Location: kidneys  · Severity: Severe   · Timing: continous  · Context (ex: related to condition): hypotension +  refer to my assessment / impression. · Modifying factors (ex: medications, interventions): IVF + refer to my plan / recommendation. · Associated signs & symptoms (ex: edema, SOB): As mentioned above in CC and HPI      Past medical, Surgical, Social, Family medical history reviewed by me.      PAST MEDICAL HISTORY:   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    Obesity     PUD (peptic ulcer disease)     Skin cancer     Wheezing     since January     PAST SURGICAL HISTORY:   Past Surgical History:   Procedure Laterality Date    APPENDECTOMY      BRONCHOSCOPY  3/11/2016    biopsy    BRONCHOSCOPY N/A 06/08/2018    EYE SURGERY      cataracts with lens placement bilat    LUNG SURGERY Left     65% of left lung tumor removed    TONSILLECTOMY      UPPER GASTROINTESTINAL ENDOSCOPY N/A 2019    EGD BIOPSY performed by Cathryn Sweet MD at [facility] N/A 2019    EGD BIOPSY performed by Cathryn Sweet MD at [address] [place] [address] HISTORY:   Family History   Problem Relation Age of Onset    Emphysema Mother     Cancer Father     Cancer Brother     Obesity Brother      SOCIAL HISTORY:   Social History     Socioeconomic History    Marital status:      Spouse name: None    Number of children: None    Years of education: None    Highest education level: None   Occupational History    None   Social Needs    Financial resource strain: None    Food insecurity     Worry: None     Inability: None    Transportation needs     Medical: None     Non-medical: None   Tobacco Use    Smoking status: Former Smoker     Packs/day: 0.25     Years: 55.00     Pack years: 13.75     Types: Cigars     Last attempt to quit: 2016     Years since quittin.7    Smokeless tobacco: Never Used    Tobacco comment: cigars only   Substance and Sexual Activity    Alcohol use: Yes     Alcohol/week: 1.0 standard drinks     Types: 1 Cans of beer per week     Comment: week    Drug use: None    Sexual activity: Yes     Partners: Female   Lifestyle    Physical activity     Days per week: None     Minutes per session: None    Stress: None   Relationships    Social connections     Talks on phone: None     Gets together: None     Attends [employer]ism service: None     Active member of club or organization: None     Attends meetings of clubs or organizations: None     Relationship status: None    Intimate partner violence     Fear of current or ex partner: None     Emotionally abused: None     Physically abused: None     Forced sexual activity: None   Other Topics Concern    None   Social History Narrative    None         MEDICATIONS: reviewed by me.    Medications Prior to Admission:    Current Facility-Administered Medications:     insulin regular (HUMULIN R;NOVOLIN R) injection 10 Units, 10 Units, Intravenous, Once, Dirk D Pendl, DO    dextrose 50 % IV solution, 25 g, Intravenous, Once, Dirk D Pendl, DO    cefepime (MAXIPIME) 1 g IVPB minibag, 1 g, Intravenous, Once, Dirk D Pendl, DO    0.9 % sodium chloride bolus, 1,000 mL, Intravenous, Once, Dirk D Pendl, DO    0.9 % sodium chloride bolus, 1,100 mL, Intravenous, Once, Dirk D Pendl, DO    vancomycin 1000 mg IVPB in 250 mL D5W addavial, 1,000 mg, Intravenous, Once, Leda Show D Pendl, DO    Current Outpatient Medications:     aspirin 325 MG tablet, Take 325 mg by mouth daily, Disp: , Rfl:     predniSONE (DELTASONE) 10 MG tablet, Take 10 mg by mouth daily, Disp: , Rfl:     Clobetasol Prop Oint-Coal Tar (CLOBETAPLUS OINTMENT EX), Apply topically as needed, Disp: , Rfl:     acetaminophen (TYLENOL) 500 MG tablet, Take 500 mg by mouth every 6 hours as needed for Pain, Disp: , Rfl:     Ca Carbonate-Mag Hydroxide (ANTACID EXTRA STRENGTH) 675-135 MG CHEW, Take by mouth as needed, Disp: , Rfl:     Skin Protectants, Misc. (EUCERIN) cream, Apply topically as needed for Dry Skin Apply topically as needed. , Disp: , Rfl:     Misc Natural Products (GLUCOSAMINE CHONDROITIN MSM) TABS, Take by mouth daily as needed , Disp: , Rfl:     triamcinolone (KENALOG) 0.1 % lotion, Apply topically as needed Apply topically 3 times daily. , Disp: , Rfl:     diphenhydrAMINE (BENADRYL) 25 MG capsule, Take 25 mg by mouth as needed for Itching, Disp: , Rfl:     Nivolumab (OPDIVO IV), Infuse intravenously every 21 days Indications: plus Winter, Disp: , Rfl:     Not in a hospital admission. Scheduled Meds:   albuterol  10 mg Nebulization Once    insulin regular  10 Units Intravenous Once    dextrose  25 g Intravenous Once    cefepime  1 g Intravenous Once    sodium chloride  1,000 mL Intravenous Once     Continuous Infusions:  PRN Meds:. Allergies reviewed by me:  Iodides    REVIEW OF SYSTEMS:  As mentioned in chief complaint and HPI , Subjective   All other 10-point review of systems: negative. PHYSICAL EXAM:  Recent vital signs and recent I/Os reviewed by me. Wt Readings from Last 3 Encounters:   10/19/20 160 lb (72.6 kg)   06/25/20 170 lb (77.1 kg)   06/23/20 174 lb (78.9 kg)     BP Readings from Last 3 Encounters:   10/19/20 (!) 73/54   06/25/20 125/71   06/23/20 120/80     Patient Vitals for the past 24 hrs:   BP Temp Temp src Pulse Resp SpO2 Height Weight   10/19/20 1352 (!) 73/54 98 °F (36.7 °C) Infrared 88 24 96 % 5' 8\" (1.727 m) 160 lb (72.6 kg)     No intake or output data in the 24 hours ending 10/19/20 1515     Physical Exam  Vitals signs reviewed. Constitutional:       General: He is in acute distress (moderate). Appearance: He is cachectic. He is ill-appearing. HENT:      Head: Normocephalic and atraumatic. Right Ear: External ear normal.      Left Ear: External ear normal.      Nose: Nose normal.      Mouth/Throat:      Mouth: Mucous membranes are not dry. Eyes:      General: No scleral icterus. Conjunctiva/sclera: Conjunctivae normal.   Neck:      Musculoskeletal: Normal range of motion and neck supple. Vascular: No JVD. Cardiovascular:      Rate and Rhythm: Regular rhythm. Tachycardia present. Heart sounds: S1 normal and S2 normal. No murmur. Pulmonary:      Effort: Respiratory distress (mild) present. Breath sounds: Rhonchi present. Abdominal:      General: Bowel sounds are normal. There is distension. Palpations: Abdomen is soft. Tenderness: There is abdominal tenderness. Musculoskeletal:         General: No swelling or deformity. Skin:     General: Skin is warm and dry. Neurological:      Mental Status: He is alert and oriented to person, place, and time. Mental status is at baseline.    Psychiatric:         Mood and Affect: Mood normal.         Behavior: Behavior normal.           DATA:  Diagnostic tests reviewed by me for today's visit:    Recent Labs     10/19/20  1416   WBC 12.0*   HCT 43.9        Iron Saturation:  No components found for: PERCENTFE  FERRITIN:  No results found for: FERRITIN  IRON:  No results found for: IRON  TIBC:  No results found for: TIBC    Recent Labs     10/19/20  1416   *   K 6.6*      CO2 12*   BUN 75*   CREATININE 3.3*     Recent Labs     10/19/20  1416   CALCIUM 9.0     No results for input(s): PH, PCO2, PO2 in the last 72 hours.     Invalid input(s): April Noguera    ABG:  No results found for: PH, PCO2, PO2, HCO3, BE, THGB, TCO2, O2SAT  VBG:  No results found for: PHVEN, GCS0KXG, BEVEN, M5KIVMCY    LDH:    Lab Results   Component Value Date     07/17/2017     Uric Acid:  No results found for: LABURIC, URICACID    PT/INR:    Lab Results   Component Value Date    PROTIME 12.4 06/25/2020    INR 1.07 06/25/2020     Warfarin PT/INR:  No components found for: David Navarro  PTT:    Lab Results   Component Value Date    APTT 30.0 06/25/2020   [APTT}  Last 3 Troponin:  No results found for: TROPONINI    U/A:    Lab Results   Component Value Date    COLORU Yellow 08/25/2016    PROTEINU Neg 08/25/2016    SPECGRAV 1.008 08/25/2016    LEUKOCYTESUR Neg 08/25/2016    UROBILINOGEN Neg 08/25/2016    BILIRUBINUR Neg 08/25/2016    BLOODU Neg 08/25/2016    GLUCOSEU Neg 08/25/2016     Microalbumen/Creatinine ratio:  No components found for: RUCREAT  24 Hour Urine for Protein:  No components found for: RAWUPRO, UHRS3, DXEC50YG, UTV3  24 Hour Urine for Creatinine Clearance:  No components found for: CREAT4, UHRS10, UTV10  Urine Toxicology:  No components found for: IAMMENTA, IBARBIT, IBENZO, ICOCAINE, IMARTHC, IOPIATES, IPHENCYC    HgBA1c:  No results found for: LABA1C  RPR:  No results found for: RPR  HIV:  No results found for: HIV  ROBERT:  No results found for: ANATITER, ROBERT  RF:  No results found for: RF  DSDNA:  No components found for: DNA  AMYLASE:  No results found for: AMYLASE  LIPASE:  No results found for: LIPASE  Fibrinogen Level:  No components found for: FIB       BELOW MENTIONED RADIOLOGY STUDY RESULTS BY ME:    Ct Abdomen Pelvis Wo Contrast    Result Date: 10/12/2020  Site: Javy Tang #: [id number] #: [id number]: Thalia Godfrey #: [de-identified] #: [id number] #: [id number]: CT ABDOMEN PELVIS WO CONTRASTExam Date/Time: 10/12/2020 09:00 AMAdmitting Diagnosis: Transcribed OrderReason for Exam: Transcribed Order Dictated by: Roxana Deleon RUSLAN: 10/12/2020 11:24 AMT: This document is confidential medical information. Unauthorized disclosure or use of this information is prohibited by law. If you are not the intended recipient of this document, please advise us by calling immediately [phone]. Impression/Conclusion below HISTORY: Transcribed Order, Secondary malignant neoplasm of left lung ([address] Melanoma of left upper arm ([address] Secondary malignant neoplasm of left lung; Malignant melanoma of left upper limb, including shoulder COMPARISON:  September 17, 2020 and June 11, 2020 TECHNIQUE: Noncontrast multiplanar CT images of the abdomen and pelvis. Images were obtained using automated exposure control. No IV contrast was administered oral Omnipaque, 100 mL was administered NOTE:  If there are questions about the content of this report, please contact [address] [place] [address] radiology by calling [phone] FINDINGS: LOWER CHEST:  Small effusion mild peripheral basilar airspace disease as described on chest CT. Coronary artery calcification LIVER:  Solid organ evaluation is limited by the lack of IV contrast. No focal lesion is suggested GALLBLADDER/BILE DUCTS:  Present PANCREAS:  Grossly unremarkable SPLEEN:  Benign calcifications ADRENALS:  Poorly distinguished there may be a right adrenal nodule. KIDNEYS/URETERS:  Known renal cysts. Nonobstructing intrarenal calculi.  No change in renal contour or development of hydronephrosis GI TRACT:  Distention of proximal small bowel. Transition is likely in the left mid abdomen. Left colonic diverticulosis. VESSELS:  Patency cannot be determined without IV contrast LYMPH NODES: Extensive periaortic, pericaval, and mesenteric adenopathy. Size and extent adenopathy has significantly increased since June. It is more difficult to determine if there is a change in the size of the nodes when compared with September. ABD WALL:  Unremarkable PELVIS:  Unremarkable BONES:  Unremarkable OTHER:  Marked increase in ascitic fluid throughout the abdomen. Increased edema and thickening  of the omentum that may represent worsening carcinomatosis versus edema from inflow blockage. IMPRESSION: Development of massive ascites and extensive changes consistent with carcinomatosis. Extensive retroperitoneal and mesenteric adenopathy remains. It is difficult to determine if this has worsened since September. It is definitely worse when compared with June. . Distention of the proximal small bowel and decompression of the distal small bowel. Transition is likely in the left abdomen. There is gas in the colon. This may represent a partial small bowel obstruction. Report was marked significant in hospital information system for physician notification SIGNED BY: Yovana Caraballo MD on 10/12/2020 11:21 AM    [address] [facility] [phone] -  2011 [facility]: [phone]        Ct Chest Wo Contrast    Result Date: 10/12/2020  Site: Shandra Yessi #: [id number] #: [id number]: Richard Berg #: [de-identified] #: [id number] #: [id number]: CT CHEST WO CONTRASTExam Date/Time: 10/12/2020 09:00 AMAdmitting Diagnosis: Transcribed OrderReason for Exam: Transcribed Order Dictated by: Mo Alberto RUSLAN: 10/12/2020 12:55 PMT: This document is confidential medical information. Unauthorized disclosure or use of this information is prohibited by law. If you are not the intended recipient of this document, angle. Development of large amount of ascitic fluid with changes suggestive of carcinomatosis in the abdomen. This is better described on corresponding abdominal CT SIGNED BY: Lamonte Zimmerman MD on 10/12/2020 12:52 PM    [facility] [phone] -  [address] [facility] [address]: [phone]         Xr Chest Portable    Result Date: 10/19/2020  EXAMINATION: ONE XRAY VIEW OF THE CHEST 10/19/2020 2:37 pm COMPARISON: Chest CT September 17, 2020. HISTORY: ORDERING SYSTEM PROVIDED HISTORY: SOB, hypotension, hx lung cancer TECHNOLOGIST PROVIDED HISTORY: Reason for exam:->SOB, hypotension, hx lung cancer Reason for Exam: Shortness of Breath (pt states he has lung CA, increase SOB, worsening today. pt was suppose to be a direct admit but no beds were available. pt currently on PO chemo. pt having abd distention for several days) Acuity: Chronic Type of Exam: Ongoing FINDINGS: There is mild elevation of the right hemidiaphragm. There is questionable small right-sided pleural effusion. There is mild right basilar airspace disease. Calcified granulomas again seen in the left lower lung. No pneumothorax. Cardiac and mediastinal contours are without acute process. No acute osseous abnormality. Right basilar airspace disease, atelectasis versus pneumonia with questionable small right-sided pleural effusion.

## 2020-10-19 NOTE — ACP (ADVANCE CARE PLANNING)
Advanced Care Planning Note. Purpose of Encounter: Advanced care planning in light of acute and chronic deteriorating medical conditions. Parties In Attendance: Patient Héctor Rene),  wife (POA), Dr. Kiera Santos (myself)    Decisional Capacity: Yes    Subjective: Patient/family understand in this voluntary conversation that Héctor Rene continues to deteriorate and discuss what inteventions and plans patient would want implemented in light of the following diagnoses:  Metastatic melanoma    Objective: Pt has been having chronic decline in functional status with increased dependence on others for ADLs  Increased frequency of Hospitalizations. Likelihood of further hospitalizations with likelihood of escalation of medical interventions with the expectation of returning to a diminishing baseline level of functionality. Discussion highlights: I discussed with patient the ramifications of their acute and chronic medical problems- and the likely outcomes expected, both in terms of statistics, and as part of my experience seeing patients with similar conditions. We did discuss the meanings of the different possible code statuses- and we inquired which of these seem to line up with the patients current and previously expressed values. Goals of Care Determination:    Patient wishes to addend code status to [facility], and has interest in continuing this conversation, should the clinical picture change and necessitate such conversation. Plan: Continue to educate patient on medical conditions and the choices available regarding possible outcomes with regard to goals of care and code status.        Time spent on Advanced care Plannin minutes    Kiera Santos MD  10/19/2020 5:59 PM

## 2020-10-19 NOTE — ED NOTES
Irvin placed, second access site placed. MIVF infusing as ordered. Per PT admitting MD said NG tube could be placed tomorrow. Dr Annamaria Rivas will clarify.        Isreal Anthony RN  10/19/20 2989

## 2020-10-19 NOTE — ED NOTES
Bed: 20  Expected date:   Expected time:   Means of arrival:   Comments:  [address], RN  10/19/20 0316

## 2020-10-20 NOTE — CONSULTS
P Pulmonary and Critical Care   Consult Note      Reason for Consult: Shortness of breath, pneumonia  Requesting Physician: Dr. Tania Hawk  Subjective:   [address] [facility]:                The patient is a 79 y.o. male with significant past medical history of:      Diagnosis Date    Allergic     Arthritis     Hypertension     Lung mass     left    Melanoma ([address]     left back lyndsey geraldine; mets to lung    Obesity     PUD (peptic ulcer disease)     Skin cancer     Wheezing     since January     The patient was admitted to the hospital with a chief complaint of increasing shortness of breath over several days. He has some associated cough. He also had increasing abdominal girth and turns out to have had significant ascites. He is known to have metastatic melanoma. He did have paracentesis today for over 7 L of fluid. He is breathing a little better now.   His wife is at the bedside      Past Surgical History:        Procedure Laterality Date    APPENDECTOMY      BRONCHOSCOPY  3/11/2016    biopsy    BRONCHOSCOPY N/A 06/08/2018    EYE SURGERY      cataracts with lens placement bilat    LUNG SURGERY Left     65% of left lung tumor removed    TONSILLECTOMY      UPPER GASTROINTESTINAL ENDOSCOPY N/A 5/2/2019    EGD BIOPSY performed by Bentley Mcintyre MD at [address] 7/9/2019    EGD BIOPSY performed by Bentley Mcintyre MD at [address] [place] [address]     Current Medications:    Current Facility-Administered Medications: albumin human 25 % IV solution 25 g, 25 g, Intravenous, Q8H  meropenem (MERREM) 500 mg IVPB (mini-bag), 500 mg, Intravenous, Q12H  sodium bicarbonate 50 mEq in dextrose 5 % 1,000 mL infusion, , Intravenous, Continuous  hydrocortisone sodium succinate PF (SOLU-CORTEF) 100 mg in dextrose 5 % 50 mL IVPB, 100 mg, Intravenous, Q8H  sodium chloride flush 0.9 % injection 10 mL, 10 mL, Intravenous, 2 times per day  sodium chloride flush 0.9 % injection 10 mL, 10 mL, Intravenous, PRN  acetaminophen (TYLENOL) tablet 650 mg, 650 mg, Oral, Q6H PRN **OR** acetaminophen (TYLENOL) suppository 650 mg, 650 mg, Rectal, Q6H PRN  polyethylene glycol (GLYCOLAX) packet 17 g, 17 g, Oral, Daily PRN  promethazine (PHENERGAN) tablet 12.5 mg, 12.5 mg, Oral, Q6H PRN **OR** ondansetron (ZOFRAN) injection 4 mg, 4 mg, Intravenous, Q6H PRN  heparin (porcine) injection 5,000 Units, 5,000 Units, Subcutaneous, 3 times per day  vancomycin (VANCOCIN) intermittent dosing (placeholder), , Other, RX Placeholder    Allergies   Allergen Reactions    Iodides Itching     Patient gets Medrol (methyl-prednisolone) pre-medication for IV Contrast studies        Social History:    TOBACCO:   reports that he quit smoking about 4 years ago. His smoking use included cigars. He has a 13.75 pack-year smoking history. He has never used smokeless tobacco.  ETOH:   reports current alcohol use of about 1.0 standard drinks of alcohol per week. Patient currently lives independently  Environmental/chemical exposure: None known    Family History:       Problem Relation Age of Onset    Emphysema Mother     Cancer Father     Cancer Brother     Obesity Brother      REVIEW OF SYSTEMS:    CONSTITUTIONAL:  negative for fevers, chills, diaphoresis, activity change, appetite change, fatigue, night sweats and unexpected weight change.    EYES:  negative for blurred vision, eye discharge, visual disturbance and icterus  HEENT:  negative for hearing loss, tinnitus, ear drainage, sinus pressure, nasal congestion, epistaxis and snoring  RESPIRATORY:  See HPI  CARDIOVASCULAR:  negative for chest pain, palpitations, exertional chest pressure/discomfort, edema, syncope  GASTROINTESTINAL:  negative for nausea, vomiting, diarrhea, constipation, blood in stool and abdominal pain  GENITOURINARY:  negative for frequency, dysuria, urinary incontinence, decreased urine volume, and hematuria  HEMATOLOGIC/LYMPHATIC:  negative  329   MCV 86.6 86.2   MCH 28.7 28.5   MCHC 33.2 33.1   RDW 18.2* 18.2*      BMP:  Recent Labs     10/20/20  0135 10/20/20  0442 10/20/20  1126   * 127* 126*   K 6.2* 5.8* 5.7*   CL 99 99 95*   CO2 17* 17* 18*   BUN 79* 79* 76*   CREATININE 3.5* 3.5* 3.4*   CALCIUM 8.1* 7.7* 8.0*   GLUCOSE 141* 141* 119*      ABG:  No results for input(s): PHART, PRO5FYC, PO2ART, MRD1FIZ, P9UREYDA, BEART in the last 72 hours. No results found for: BNP  Lab Results   Component Value Date    CKTOTAL 94 01/23/2019    TROPONINI <0.01 10/20/2020       Cultures:     Abx:    Radiology Review:  All pertinent images / reports were reviewed as a part of this visit. Assessment:     1. Metastatic melanoma  2. Ascites  3. Pneumonia    · I have reviewed laboratories, medical records and images for this visit  · Chest x-ray shows some elevation of the right hemidiaphragm with right lower lobe airspace disease which may be atelectasis versus pneumonia. · Procalcitonin is elevated  · Certainly ascites with compression could explain this. · However, entirely possible that he has an element of pneumonia as well  · Presently receiving vancomycin and Merrem. · Follow-up on cultures.   If negative, we can start to de-escalate antibiotics tomorrow  · Continue inhaled therapy  · No significant evidence of large pleural effusion based on current imaging  · Discussed with his wife at the bedside  · Comfort care is appropriate

## 2020-10-20 NOTE — BRIEF OP NOTE
Brief Postoperative Note    Halie Singh  YOB: 1940  [id number]    Pre-operative Diagnosis: ascites    Post-operative Diagnosis: Same    Procedure: paracentesis    Anesthesia: Local    Surgeons: Mamie Cates MD    Estimated Blood Loss: Less than 5 mL    Complications: None    Specimens: Was Obtained: ascitic fluid drained    Findings: Successful US-guided paracentesis.     Electronically signed by Mamie Cates MD on 10/20/2020 at 8:29 AM

## 2020-10-20 NOTE — CONSULTS
[facility]   Progress Note      SUBJECTIVE:      Patient of Dr. Trish Siu. Has history of metastatic melanoma and has gone through various treatments which included ipilimumab/nivolumab, pembrolizumab, temozolomide, BRAF inhibitors. He has been doing poorly and is admitted in the hospital with shortness of breath. He has been noted to have abdominal distention. CT abdomen and pelvis has shown progressive disease with diffuse peritoneal nodularity consistent with carcinomatosis, pathological lymphadenopathy and large volume ascites. Patient is very tired and fatigued  His appetite is not great. No headaches or dizziness  He does have shortness of breath. No fever or chills. OBJECTIVE    Physical  VITALS:  BP (!) 104/55   Pulse 87   Temp 97.2 °F (36.2 °C) (Temporal)   Resp 27   Ht 5' 8\" (1.727 m)   Wt 171 lb 8.3 oz (77.8 kg)   SpO2 100%   BMI 26.08 kg/m²   TEMPERATURE:  Current - Temp: 97.2 °F (36.2 °C); Max - Temp  Av.6 °F (36.4 °C)  Min: 97.2 °F (36.2 °C)  Max: 98 °F (36.7 °C)  BLOOD PRESSURE RANGE:  Systolic (50QRZ), TZR:37 , Min:73 , MNR:218   ; Diastolic (36GZG), YKZ:24, Min:39, Max:57    24HR INTAKE/OUTPUT:      Intake/Output Summary (Last 24 hours) at 10/19/2020 2109  Last data filed at 10/19/2020 1830  Gross per 24 hour   Intake 2250 ml   Output --   Net 2250 ml     Conscious alert and oriented. Looks frail and emaciated. Oral mucosa is dry  No mucositis or thrush noted  Lungs: Basilar crackles were heard. Mild to moderate respiratory distress was noted. Abdomen was distended. Bowel sounds were heard  Ascites was present. Extremities minimal edema noted.       Data  Labs:  General Labs:  CBC with Differential:    Lab Results   Component Value Date    WBC 12.0 10/19/2020    RBC 5.06 10/19/2020    RBC 4.90 2017    HGB 14.5 10/19/2020    HCT 43.9 10/19/2020     10/19/2020    MCV 86.6 10/19/2020    MCH 28.7 10/19/2020    MCHC 33.2 10/19/2020    RDW 18.2 10/19/2020    SEGSPCT 50.0 06/10/2013    LYMPHOPCT 1.6 10/19/2020    LYMPHOPCT 20.0 08/23/2017    MONOPCT 3.1 10/19/2020    EOSPCT 6.9 05/02/2011    BASOPCT 0.1 10/19/2020    MONOSABS 0.4 10/19/2020    LYMPHSABS 0.2 10/19/2020    EOSABS 0.0 10/19/2020    BASOSABS 0.0 10/19/2020    DIFFTYPE Auto-K 06/10/2013     BMP:    Lab Results   Component Value Date     10/19/2020     10/19/2020    K 6.6 10/19/2020    K 6.7 10/19/2020    K 3.5 07/23/2019     10/19/2020    CL 98 10/19/2020    CO2 12 10/19/2020    CO2 10 10/19/2020    BUN 75 10/19/2020    BUN 77 10/19/2020    LABALBU 2.7 10/19/2020    CREATININE 3.3 10/19/2020    CREATININE 3.4 10/19/2020    CALCIUM 9.0 10/19/2020    CALCIUM 9.3 10/19/2020    GFRAA 22 10/19/2020    GFRAA 21 10/19/2020    GFRAA >60 06/10/2013    LABGLOM 18 10/19/2020    LABGLOM 18 10/19/2020    GLUCOSE 118 10/19/2020    GLUCOSE 111 10/19/2020    GLUCOSE 87 08/23/2017     Hepatic Function Panel:    Lab Results   Component Value Date    ALKPHOS 218 10/19/2020    ALT 7 10/19/2020    AST 18 10/19/2020    PROT 6.1 10/19/2020    PROT 6.0 08/23/2017    BILITOT 0.4 10/19/2020    BILIDIR <0.2 10/19/2020    IBILI see below 10/19/2020    LABALBU 2.7 10/19/2020     LDH:    Lab Results   Component Value Date     07/17/2017     PT/INR:    Lab Results   Component Value Date    PROTIME 12.0 10/19/2020    INR 1.03 10/19/2020     PTT:    Lab Results   Component Value Date    APTT 30.0 06/25/2020   [APTT    Current Medications  Current Facility-Administered Medications: sodium bicarbonate 50 mEq in dextrose 5 % 1,000 mL infusion, , Intravenous, Continuous  [START ON 10/20/2020] hydrocortisone sodium succinate PF (SOLU-CORTEF) 100 mg in dextrose 5 % 50 mL IVPB, 100 mg, Intravenous, Q8H  meropenem (MERREM) 500 mg IVPB (mini-bag), 500 mg, Intravenous, Q8H  sodium chloride flush 0.9 % injection 10 mL, 10 mL, Intravenous, 2 times per day  sodium chloride flush 0.9 % injection 10 mL, 10 mL, Intravenous, PRN  acetaminophen (TYLENOL) tablet 650 mg, 650 mg, Oral, Q6H PRN **OR** acetaminophen (TYLENOL) suppository 650 mg, 650 mg, Rectal, Q6H PRN  polyethylene glycol (GLYCOLAX) packet 17 g, 17 g, Oral, Daily PRN  promethazine (PHENERGAN) tablet 12.5 mg, 12.5 mg, Oral, Q6H PRN **OR** ondansetron (ZOFRAN) injection 4 mg, 4 mg, Intravenous, Q6H PRN  heparin (porcine) injection 5,000 Units, 5,000 Units, Subcutaneous, 3 times per day  [START ON 10/20/2020] vancomycin (VANCOCIN) intermittent dosing (placeholder), , Other, RX Placeholder    ASSESSMENT AND PLAN    1. Metastatic progressive melanoma:    -Patient is hospice appropriate  -Patient's primary oncologist Dr. Cheyenne Pedraza will be rounding tomorrow. 2.  Abdominal distention and ascites:    -Ultrasound-guided paracentesis will be obtained. 3.  Pneumonia:    -Management per primary team with broad-spectrum antibiotics. Thank you for notification will follow.       Carlos Grande MD

## 2020-10-20 NOTE — PROGRESS NOTES
Pharmacy Note  Vancomycin Consult    Doni Fletcher is a 79 y.o. male started on Vancomycin for SEPSIS; consult received from Dr. Aristeo Leone to manage therapy. Also receiving the following antibiotics: meropenem. Patient Active Problem List   Diagnosis    Essential hypertension, benign    Peptic ulcer    Shortness of breath    Lung mass    Hilar mass    Secondary malignant neoplasm of left lung (HCC)    Malignant melanoma of skin of left upper extremity, including shoulder ([address]    Drug-induced fever    Rigors    Encounter for antineoplastic immunotherapy    Mediastinal adenopathy    Malignant melanoma (HCC)    Pneumonitis    Pneumonitis    Pulmonary nodules    Dyspnea       Allergies: Iodides     Temp max: 98.0    Recent Labs     10/19/20  1416   BUN 77*  75*       Recent Labs     10/19/20  1416   CREATININE 3.4*  3.3*       Recent Labs     10/19/20  1416   WBC 12.0*         Intake/Output Summary (Last 24 hours) at 10/19/2020 2036  Last data filed at 10/19/2020 1830  Gross per 24 hour   Intake 2250 ml   Output --   Net 2250 ml       Culture Date      Source                       Results      Ht Readings from Last 1 Encounters:   10/19/20 5' 8\" (1.727 m)        Wt Readings from Last 1 Encounters:   10/19/20 171 lb 8.3 oz (77.8 kg)         Body mass index is 26.08 kg/m². Estimated Creatinine Clearance: 18 mL/min (A) (based on SCr of 3.3 mg/dL (H)). Goal Trough Level: 10-15 mcg/mL    Assessment/Plan:  Will initiate Vancomycin with a one time loading dose of 1000 mg x1, will use random levels to determine dosing. Timing of trough level will be determined based on culture results, renal function, and clinical response. Thank you for the consult. Will continue to follow.

## 2020-10-20 NOTE — PROGRESS NOTES
Chris Kerr MD   Patient: Thenchen Masters   10/20/20 3:27 PM     Patients BP has been very soft since paracentesis. Albumin given x1. Bicarb infusing @ 150. last BP 76/42  Read 3:29 PM     10/20/20 3:30 PM   Pls increase another same Albumin then increase to Q6. Keep fluids as current. Pls consider to initiate pressor support. Thanks!      10/20/20 3:30 PM   *give another same albumin then     10/20/20 3:31 PM   ok

## 2020-10-20 NOTE — PROGRESS NOTES
Hospitalist Progress Note      PCP: Dipesh Diallo MD    Date of Admission: 10/19/2020    Chief Complaint: Dyspnea fatigue abdominal distention    Hospital Course:   S/p paracentesis at 7 L fluid removed  Blood pressure in around 86 systolic  Feels much better  Down to 2 L oxygen  No chest pain  Denies any abdominal pain  No nausea vomiting  Has been passing gas        Medications:  Reviewed      Exam:    BP (!) 88/48   Pulse 71   Temp 98 °F (36.7 °C) (Temporal)   Resp 16   Ht 5' 8\" (1.727 m)   Wt 172 lb 1.6 oz (78.1 kg)   SpO2 97%   BMI 26.17 kg/m²     General appearance: No apparent distress, appears stated age and cooperative. HEENT: Pupils equal, round, and reactive to light. Conjunctivae/corneas clear. Neck: Supple, with full range of motion. No jugular venous distention. Trachea midline. Respiratory: Decreased air entry at lung bases  Cardiovascular: Regular rate and rhythm with normal S1/S2 without MURMURS, rubs or gallops. Abdomen: Soft nontender bowel sounds are positive musculoskeletal: No clubbing, cyanosis or EDEMA bilaterally. Full range of motion without deformity. Skin: Skin color, texture, turgor normal.  No rashes or lesions. Neurologic:  Neurovascularly intact without any focal sensory/motor deficits.  Cranial nerves: II-XII intact, grossly non-focal.        Labs:   Recent Labs     10/19/20  1416 10/20/20  0442   WBC 12.0* 11.7*   HGB 14.5 12.6*   HCT 43.9 38.1*    329     Recent Labs     10/19/20  1416 10/20/20  0135 10/20/20  0442 10/20/20  1126   *  129* 128* 127* 126*   K 6.7*  6.6* 6.2* 5.8* 5.7*   CL 98*  100 99 99 95*   CO2 10*  12* 17* 17* 18*   BUN 77*  75* 79* 79* 76*   CREATININE 3.4*  3.3* 3.5* 3.5* 3.4*   CALCIUM 9.3  9.0 8.1* 7.7* 8.0*   PHOS 5.6*  --   --   --      Recent Labs     10/19/20  1416 10/20/20  0442   AST 18 20   ALT 7* 6*   BILIDIR <0.2 <0.2   BILITOT 0.4 0.3   ALKPHOS 218* 173*     Recent Labs     10/19/20  1416 10/20/20  5848 INR 1.03 1.05     Recent Labs     10/19/20  1903 10/20/20  0135   TROPONINI <0.01 <0.01       Urinalysis:      Lab Results   Component Value Date    NITRU Negative 10/19/2020    WBCUA 5 10/19/2020    RBCUA 3-4 10/19/2020    BLOODU Negative 10/19/2020    SPECGRAV 1.018 10/19/2020    GLUCOSEU Negative 10/19/2020       Radiology:  IR US GUIDED PARACENTESIS   Final Result   Successful ultrasound guided paracentesis. CT ABDOMEN PELVIS WO CONTRAST Additional Contrast? None   Final Result   Dilated loops of small bowel throughout the anterior, central, and midline   abdomen suspicious for obstruction, though evaluation for a discrete   transition point is limited by the absence of contrast and the volume of   intraperitoneal ascites. Extensive metastatic disease with diffuse peritoneal nodularity presumed on   the basis of carcinomatosis, new from prior comparison evaluation 5 months   prior. .      Pathologic lymphadenopathy throughout the abdomen and pelvis, slightly   decreased in bulk in the retroperitoneum and increased throughout the   mesentery relative to examination from 5 months prior. The adenopathy within   the partially imaged lower chest is grossly stable. Large volume simple ascites, new from prior comparison. New small simple right pleural effusion. Asymmetric enlargement and soft tissue stranding within the right pectoralis   subcutaneous soft tissues. Correlate with physical exam for cellulitis or   other focal abnormality. XR CHEST PORTABLE   Final Result   Right basilar airspace disease, atelectasis versus pneumonia with   questionable small right-sided pleural effusion.              Assessment/Plan:    Active Hospital Problems    Diagnosis Date Noted    Dyspnea [R06.00] 10/19/2020       Acute Medical Issues Being Addressed:    60-year-old admitted to the hospital complaining of shortness of breath abdominal pain and distention    Dyspnea suspect multifactorial due to an underlying pneumonia gram-positive/gram-negative and worsening abdominal distention  Breathing seems to have improved since the paracentesis  Currently on vancomycin and meropenem  Nasal MRSA sent    Abdominal distention secondary to ascites probably related to his underlying malignancy  S/p paracentesis    Sepsis secondary to pneumonia as above  Lactate seems to have improved  He is on IV hydrocortisone  Continue broad-spectrum antibiotics    Small bowel obstruction probably related to his underlying malignancy and ascites  Abdominal examination is benign  General surgery consulted  We will get KUB  If that looks better he seems to be passing some gas  If so we will start him on a clear liquid diet    Metastatic melanoma  Seen by oncology  Has had multiple rounds of chemotherapy    Discussed with the wife yesterday  He is currently DNR CC arrest  Palliative care have also been consulted    Acute kidney injury suspect prerenal versus ATN with anion gap metabolic acidosis  Nephrology consulted  On IV bicarb drip  Urine output 425 mL    Of the above discussed at length with the patient and with his wife    DVT Prophylaxis: sc heparin   Diet: Diet NPO Effective Now  Code Status: Limited      Dispo - once acute medical processes have resolved    Sandra Moncada MD

## 2020-10-20 NOTE — PROGRESS NOTES
Clinical Pharmacy Note: Pharmacy to Dose Vancomcyin    Vancomycin Day: 2  Current Dosing: Intermittent dosing based on random levels. Recent Labs     10/20/20  0442 10/20/20  1126   BUN 79* 76*       Recent Labs     10/20/20  0442 10/20/20  1126   CREATININE 3.5* 3.4*       Recent Labs     10/19/20  1416 10/20/20  0442   WBC 12.0* 11.7*         Intake/Output Summary (Last 24 hours) at 10/20/2020 1445  Last data filed at 10/20/2020 0531  Gross per 24 hour   Intake 4928 ml   Output 425 ml   Net 4503 ml         Ht Readings from Last 1 Encounters:   10/19/20 5' 8\" (1.727 m)        Wt Readings from Last 1 Encounters:   10/20/20 172 lb 1.6 oz (78.1 kg)         Body mass index is 26.17 kg/m². Estimated Creatinine Clearance: 17 mL/min (A) (based on SCr of 3.4 mg/dL (H)). Trough/Random: < 4 mcg/mL    Assessment/Plan:  Vancomycin level is subtherapeutic. Level was drawn appropriately in respect to last dose given. Will give 1250 mg once. A vancomycin random level has been ordered for tomorrow AM.  Changes in regimen will be determined based on culture results, renal function, and clinical response. Pharmacy will continue to monitor and adjust regimen as necessary.     Thank you for the consult,    Bill Espinoza, PharmD  PGY-1 Pharmacy Resident  [id number]

## 2020-10-20 NOTE — FLOWSHEET NOTE
10/20/20 0945   Vitals   Temp 98 °F (36.7 °C)   Temp Source Temporal   Pulse 71   Heart Rate Source Monitor   Resp 16   BP (!) 88/48   MAP (mmHg) (!) 58   BP Location Left upper arm   BP Upper/Lower Upper   BP Method Automatic   Patient Position Supine   Level of Consciousness 0   MEWS Score 2   Patient Currently in Pain No   Cardiac Rhythm NSR   Ectopy PVC   Ectopy Frequency Occasional   Oxygen Therapy   SpO2 97 %   Pulse Oximeter Device Mode Intermittent   Pulse Oximeter Device Location Finger   O2 Device Nasal cannula   O2 Flow Rate (L/min) 1 L/min   Pain Assessment   Pain Assessment 0-10   Pain Level 0   Patient back from IR for US guided paracentesis, which 7100mL was removed. Abdomen is soft and nontender. Puncture site C/D/I. Patient is resting in bed. VSS. Wife, \"Josselin\" at bedside.   Hal Wheat RN

## 2020-10-20 NOTE — PROGRESS NOTES
Cortland Jeans, MD Catana Chill, MD Lorna Bradford, MD               Office: [phone]                      Fax: [phone] [facility]                   NEPHROLOGY ICU PROGRESS NOTE:     PATIENT NAME: Marley Romero  : 1940  MRN: [id number]       RECOMMENDATIONS:   Hypovolemic (per urine lytes) -> ischemic ATN - plateau now   - continue agressive IVF ,    -> with bicarb infusion at current rate,   - f/u paracentesis, if LVP w/ >5L -> will consider IV albumin     - no K-binder use, small bowel obstruction  - s/p I/D + bicarb    - keep bicarb for K  - f/u recheck BMP Q6hr till tonight.    -then renal panel in am.     - keep bonds   - f/u 1' oncology, hold Nivolumab   - no need for dialysis ,    - poor candidate for long term dialysis w/ poor oncology prognosis. - although pt is interested in dialysis. - needs palliative consult, for GOC - f/u today     - ICU admission, as at higher risk for decompensation, needing closer monitoring    D/C plan: unclear,         IMPRESSION:       ATUL (on CKD-3A): Oliguric, severe - plateau now   - BL Scr- 0.6 ---> 3.3 on admission  : Etiology of ATUL - pre-renal / ATN : w/ hypotension 70s/50s    - other differentials: r/o obstruction   - Renal imaging: CT on 10/12: Development of massive ascites and extensive changes consistent with carcinomatosis. Associated problems:   - Azotemia: severe in to 70s - pre-renal - improving, but not resolving yet   - Electrolytes: K: 6.6 - hyperkalemia    - Volume status: hypo-volemic :   : Na: hyponatremia - moderate   : BP: Hypotension 70/50s in ER -   - Acid-Base: severe, 12, non-AG - metabolic, LA 3.5 on admission  - Anemia: none      : other supportive care :   - Check daily renal function panel with electrolytes-phosphorus  - Strict monitoring of I/Os, daily weight  - Renal feeds/diet  - Current medications reviewed. - Nephrotoxic medications have been discontinued.     - Dose adjusted and appropriate. - Dose meds for eGFR <15 mL/min/1.73m2 during ATUL    - Avoid heavy opioids due to renal failure - may use very low dose dilaudid / fentanyl with close monitoring of CNS and respiratory depression. Other problems: Management per primary and other consulting teams. # H/O MM, ascites, carcinomatosis     # SBO - on conservative mx     Hospital Problems           Last Modified POA    Dyspnea 10/19/2020 Yes          Please refer to the orders. High Complexity. Multiple complex problems. Discussed with patient, and his wife , ICU RN   Severally ill, at risk of impending organ failure needing  higher level of care/monitoring. Time spent 35 minutes that included face-to-face meeting/discussion with patient, patient's family , and treatment team (including primary/referring team and other consultants; included coordination of care with the treatment team; and review of patient's electronic medical records and ordering appropriates tests. Thank you for allowing me to participate in this patient's care. Please do not hesitate to contact me with any questions/concerns. We will follow along with you. Vance Chacon MD       [facility] [address] [place] [address] [place]: [phone] or Via [email or web address]  Fax: [phone]        ========================================================   ========================================================   Subjective:   Seen resting in bed,   Remains drowsy, but arousable to loud voice  Minimal UOP  Past medical, Surgical, Social, Family medical history reviewed by me. MEDICATIONS: reviewed by me.    Medications Prior to Admission:    Current Facility-Administered Medications:     sodium bicarbonate 50 mEq in dextrose 5 % 1,000 mL infusion, , Intravenous, Continuous, Kiera Santos MD, Last Rate: 150 mL/hr at 10/19/20 1752    hydrocortisone sodium succinate PF (SOLU-CORTEF) 100 mg in dextrose 5 % 50 mL IVPB, 100 mg, Intravenous, Q8H, Cirilo Bailey MD, Stopped at 10/20/20 0531    meropenem (MERREM) 500 mg IVPB (mini-bag), 500 mg, Intravenous, Q8H, Cirilo Bailey MD, Stopped at 10/20/20 0531    sodium chloride flush 0.9 % injection 10 mL, 10 mL, Intravenous, 2 times per day, Cirilo Bailey MD    sodium chloride flush 0.9 % injection 10 mL, 10 mL, Intravenous, PRN, Cirilo Bailey MD    acetaminophen (TYLENOL) tablet 650 mg, 650 mg, Oral, Q6H PRN, 650 mg at 10/20/20 0003 **OR** acetaminophen (TYLENOL) suppository 650 mg, 650 mg, Rectal, Q6H PRN, Cirilo Bailey MD    polyethylene glycol (GLYCOLAX) packet 17 g, 17 g, Oral, Daily PRN, Cirilo Bailey MD    promethazine (PHENERGAN) tablet 12.5 mg, 12.5 mg, Oral, Q6H PRN **OR** ondansetron (ZOFRAN) injection 4 mg, 4 mg, Intravenous, Q6H PRN, Cirilo Bailey MD    heparin (porcine) injection 5,000 Units, 5,000 Units, Subcutaneous, 3 times per day, Cirilo Bailey MD, 5,000 Units at 10/20/20 2606    vancomycin (VANCOCIN) intermittent dosing (placeholder), , Other, RX Placeholder, Cirilo Bailey MD    Medications Prior to Admission: aspirin 325 MG tablet, Take 325 mg by mouth daily  predniSONE (DELTASONE) 10 MG tablet, Take 10 mg by mouth daily  Clobetasol Prop Oint-Coal Tar (CLOBETAPLUS OINTMENT EX), Apply topically as needed  acetaminophen (TYLENOL) 500 MG tablet, Take 500 mg by mouth every 6 hours as needed for Pain  Ca Carbonate-Mag Hydroxide (ANTACID EXTRA STRENGTH) 675-135 MG CHEW, Take by mouth as needed  Skin Protectants, Misc. (EUCERIN) cream, Apply topically as needed for Dry Skin Apply topically as needed. Misc Natural Products (GLUCOSAMINE CHONDROITIN MSM) TABS, Take by mouth daily as needed   triamcinolone (KENALOG) 0.1 % lotion, Apply topically as needed Apply topically 3 times daily.   diphenhydrAMINE (BENADRYL) 25 MG capsule, Take 25 mg by mouth as needed for Itching  Nivolumab (OPDIVO IV), Infuse intravenously every 21 days Indications: plus Yervoy     Scheduled Meds:   hydrocortisone (SOLU-CORTEF) IVPB  100 mg Intravenous Q8H    meropenem  500 mg Intravenous Q8H    sodium chloride flush  10 mL Intravenous 2 times per day    heparin (porcine)  5,000 Units Subcutaneous 3 times per day    vancomycin (VANCOCIN) intermittent dosing (placeholder)   Other RX Placeholder     Continuous Infusions:   sodium bicarbonate infusion 150 mL/hr at 10/19/20 1752     PRN Meds:. REVIEW OF SYSTEMS:  As mentioned in chief complaint and HPI , Subjective        PHYSICAL EXAM:  Recent vital signs and recent I/Os reviewed by me.      Wt Readings from Last 3 Encounters:   10/20/20 172 lb 1.6 oz (78.1 kg)   06/25/20 170 lb (77.1 kg)   06/23/20 174 lb (78.9 kg)     BP Readings from Last 3 Encounters:   10/20/20 (!) 84/50   06/25/20 125/71   06/23/20 120/80     Patient Vitals for the past 24 hrs:   BP Temp Temp src Pulse Resp SpO2 Height Weight   10/20/20 0531 -- -- -- -- -- -- -- 172 lb 1.6 oz (78.1 kg)   10/20/20 0500 (!) 84/50 -- -- 71 19 -- -- --   10/20/20 0400 81/74 -- -- 79 22 -- -- --   10/20/20 0300 (!) 84/56 -- -- 72 19 -- -- --   10/20/20 0200 (!) 97/55 -- -- 77 22 -- -- --   10/20/20 0100 96/76 -- -- 77 19 -- -- --   10/20/20 0000 (!) 101/59 -- -- 82 24 -- -- --   10/19/20 2300 87/75 -- -- 80 22 -- -- --   10/19/20 2200 122/64 -- -- 83 17 -- -- --   10/19/20 2100 95/61 -- -- 83 26 -- -- --   10/19/20 2000 (!) 96/57 -- -- 78 23 -- -- --   10/19/20 1913 -- -- -- -- -- -- -- 171 lb 8.3 oz (77.8 kg)   10/19/20 1910 (!) 104/55 97.2 °F (36.2 °C) Temporal 87 27 100 % -- --   10/19/20 1830 (!) 109/57 -- -- 91 27 100 % -- --   10/19/20 1730 (!) 108/57 -- -- 84 27 99 % -- --   10/19/20 1700 (!) 84/39 -- -- 151 30 100 % -- --   10/19/20 1557 -- -- -- -- -- 98 % -- --   10/19/20 1457 (!) 89/45 -- -- 75 26 -- -- --   10/19/20 1352 (!) 73/54 98 °F (36.7 °C) Infrared 88 24 96 % 5' 8\" (1.727 m) 160 lb (72.6 kg)       Intake/Output Summary (Last 24 hours) at 10/20/2020 2112  Last data filed at 10/20/2020 0531  Gross per 9.0 8.1* 7.7*   PHOS 5.6*  --   --      No results for input(s): PH, PCO2, PO2 in the last 72 hours.     Invalid input(s): Yeni Gonzalez    ABG:  No results found for: PH, PCO2, PO2, HCO3, BE, THGB, TCO2, O2SAT  VBG:    Lab Results   Component Value Date    PHVEN 7.255 10/19/2020    TVQ2KAD 33.7 10/19/2020    BEVEN -11.2 10/19/2020    H7ZQQYGG 81 10/19/2020       LDH:    Lab Results   Component Value Date     07/17/2017     Uric Acid:    Lab Results   Component Value Date    LABURIC 10.2 10/19/2020       PT/INR:    Lab Results   Component Value Date    PROTIME 12.2 10/20/2020    INR 1.05 10/20/2020     Warfarin PT/INR:  No components found for: Emperatriz Mccann  PTT:    Lab Results   Component Value Date    APTT 30.0 06/25/2020   [APTT}  Last 3 Troponin:    Lab Results   Component Value Date    TROPONINI <0.01 10/20/2020    TROPONINI <0.01 10/19/2020       U/A:    Lab Results   Component Value Date    COLORU DARK YELLOW 10/19/2020    PROTEINU 30 10/19/2020    PHUR 5.0 10/19/2020    WBCUA 5 10/19/2020    RBCUA 3-4 10/19/2020    CLARITYU CLOUDY 10/19/2020    SPECGRAV 1.018 10/19/2020    LEUKOCYTESUR SMALL 10/19/2020    UROBILINOGEN 1.0 10/19/2020    BILIRUBINUR SMALL 10/19/2020    BLOODU Negative 10/19/2020    GLUCOSEU Negative 10/19/2020     Microalbumen/Creatinine ratio:  No components found for: RUCREAT  24 Hour Urine for Protein:  No components found for: RAWUPRO, UHRS3, UVLQ95WT, UTV3  24 Hour Urine for Creatinine Clearance:  No components found for: CREAT4, UHRS10, UTV10  Urine Toxicology:  No components found for: IAMMENTA, IBARBIT, IBENZO, ICOCAINE, IMARTHC, IOPIATES, IPHENCYC    HgBA1c:  No results found for: LABA1C  RPR:  No results found for: RPR  HIV:  No results found for: HIV  ROBERT:  No results found for: ANATITER, ROBERT  RF:  No results found for: RF  DSDNA:  No components found for: DNA  AMYLASE:  No results found for: AMYLASE  LIPASE:    Lab Results   Component Value Date    LIPASE Transition is likely in the left mid abdomen. Left colonic diverticulosis. VESSELS:  Patency cannot be determined without IV contrast LYMPH NODES: Extensive periaortic, pericaval, and mesenteric adenopathy. Size and extent adenopathy has significantly increased since June. It is more difficult to determine if there is a change in the size of the nodes when compared with September. ABD WALL:  Unremarkable PELVIS:  Unremarkable BONES:  Unremarkable OTHER:  Marked increase in ascitic fluid throughout the abdomen. Increased edema and thickening  of the omentum that may represent worsening carcinomatosis versus edema from inflow blockage. IMPRESSION: Development of massive ascites and extensive changes consistent with carcinomatosis. Extensive retroperitoneal and mesenteric adenopathy remains. It is difficult to determine if this has worsened since September. It is definitely worse when compared with June. . Distention of the proximal small bowel and decompression of the distal small bowel. Transition is likely in the left abdomen. There is gas in the colon. This may represent a partial small bowel obstruction. Report was marked significant in hospital information system for physician notification SIGNED BY: Taylor Infante MD on 10/12/2020 11:21 AM    [facility] [phone] -  [address] [facility] [address]: [phone]        Ct Chest Wo Contrast    Result Date: 10/12/2020  Site: Leticia Youssef #: [id number] #: [id number]: Lisseth Avalos #: [de-identified] #: [id number] #: [id number]: CT CHEST WO CONTRASTExam Date/Time: 10/12/2020 09:00 AMAdmitting Diagnosis: Transcribed OrderReason for Exam: Transcribed Order Dictated by: Lamberto Angela RUSLAN: 10/12/2020 12:55 PMT: This document is confidential medical information. Unauthorized disclosure or use of this information is prohibited by law. If you are not the intended recipient of this document, please advise us by calling immediately [phone]. Impression/Conclusion below HISTORY: Transcribed Order, Secondary malignant neoplasm of left lung ([address] Melanoma of left upper arm ([address] Secondary malignant neoplasm of left lung; Malignant melanoma of left upper limb, including shoulder COMPARISON: September 17 and June 11 TECHNIQUE: Noncontrast multiplanar CT images of the chest. Images were obtained using automated  exposure control. NOTE:  If there are questions about the content of this report, please contact [address] [place] [address] radiology by calling [phone] FINDINGS: LUNGS/AIRWAYS:  Coarse linear stranding posteriorly in the right lower lobe with increased reticulation in the basilar segments of the right lower lobe. Patchy peripheral airspace disease in the right costophrenic angle has increased in size slightly. PLEURA: New right small effusion. MEDIASTINUM/SUZANNE:  Lobulation adjacent to the origin of the brachiocephalic arteries most consistent with adenopathy in this noncontrast exam. The appearance is stable from September. Enlarged periaortic nodes along the descending aorta again evident and are not significantly changed since September. No change in hilar contour since September. There are calcified nodes. HEART/PERICARDIUM:  Coronary artery calcification. VESSELS:  Unremarkable. No aneurysm CHEST WALL/LOWER NECK:  Unremarkable UPPER ABDOMEN:  Huge increase in amount of abdominal ascites with thickening of the omentum suggestive of carcinomatosis. Upper abdominal adenopathy. Nonobstructing renal calculi. Findings and described in more detail on corresponding abdominal BONES:  No suspicious osteolytic lesion OTHER:  None  IMPRESSION: Stable adenopathy adjacent to the right brachiocephalic vessels and along the descending aorta since September. New small right effusion. Right basilar reticulation and coarse stranding persists. There is increased patchy peripheral airspace disease in the right costophrenic angle.  Development of large amount of ascitic fluid with changes suggestive of carcinomatosis in the abdomen. This is better described on corresponding abdominal CT SIGNED BY: Jm Mckenzie MD on 10/12/2020 12:52 PM    [facility] [phone] -  [address] [facility] [address]: [phone]         Xr Chest Portable    Result Date: 10/19/2020  EXAMINATION: ONE XRAY VIEW OF THE CHEST 10/19/2020 2:37 pm COMPARISON: Chest CT September 17, 2020. HISTORY: ORDERING SYSTEM PROVIDED HISTORY: SOB, hypotension, hx lung cancer TECHNOLOGIST PROVIDED HISTORY: Reason for exam:->SOB, hypotension, hx lung cancer Reason for Exam: Shortness of Breath (pt states he has lung CA, increase SOB, worsening today. pt was suppose to be a direct admit but no beds were available. pt currently on PO chemo. pt having abd distention for several days) Acuity: Chronic Type of Exam: Ongoing FINDINGS: There is mild elevation of the right hemidiaphragm. There is questionable small right-sided pleural effusion. There is mild right basilar airspace disease. Calcified granulomas again seen in the left lower lung. No pneumothorax. Cardiac and mediastinal contours are without acute process. No acute osseous abnormality. Right basilar airspace disease, atelectasis versus pneumonia with questionable small right-sided pleural effusion.

## 2020-10-20 NOTE — CONSULTS
Palliative Care:        79 y.o. male with PMHx significant for melanoma with mets multiple rounds of chemotherapy increased metastatic burden in the abdomen admitted to the hospital on 10/19/20 with increasing fatigue unable to tolerate p.o. and increasing abdominal distention noted over last (3) weeks. No endurance, SOB at rest.    CBC:        Recent Labs     10/19/20  1416 10/20/20  0442   WBC 12.0* 11.7*   HGB 14.5 12.6*    329      BMP:          Recent Labs     10/19/20  1416 10/20/20  0135 10/20/20  0442   *  129* 128* 127*   K 6.7*  6.6* 6.2* 5.8*   CL 98*  100 99 99   CO2 10*  12* 17* 17*   BUN 77*  75* 79* 79*   CREATININE 3.4*  3.3* 3.5* 3.5*   GLUCOSE 111*  118* 141* 141*      Hepatic:        Recent Labs     10/19/20  1416 10/20/20  0442   AST 18 20   ALT 7* 6*   BILITOT 0.4 0.3   ALKPHOS 218* 173*     Past Medical History:   has a past medical history of Allergic, Arthritis, Hypertension, Lung mass, Melanoma ([facility] [address] Obesity, PUD (peptic ulcer disease), Skin cancer, and Wheezing. Past Surgical History:   has a past surgical history that includes Appendectomy; Tonsillectomy; eye surgery; bronchoscopy (3/11/2016); bronchoscopy (N/A, 06/08/2018); Lung surgery (Left); Upper gastrointestinal endoscopy (N/A, 5/2/2019); and Upper gastrointestinal endoscopy (N/A, 7/9/2019). Advance Directives:  Limited. No Intubation/Cardiac medication        Problem Severity: Pain/Other Symptoms:    Patient with increased fatigue over last (3) weeks. Bed Mobility/Toileting/Transfer:  Independent in home setting. Use of walker.          Performance Status:        Palliative Performance Scale:  100% []Full Normal activity & work No evidence of disease  90%   [] Full Normal activity & work Some evidence of disease  80%   [] Full Normal activity with Effort Some evidence of disease  70%   [] Reduced Unable Normal Job/Work Significant disease Full Normal or reduced  60%   [] Ambulation reduced; Significant disease; Can't do hobbies/housework; intake normal   or reduced; occasional assist; LOC full/confusion  50%   [] Mainly sit/lie; Extensive disease; Can't do any work; Considerable assist; intake normal  Or reduced; LOC full/confusion  40%   [x] Mainly in bed; Extensive disease; Mainly assist; intake normal or reduced; occasional assist; LOC full/confusion  30%   [] Bed Bound; Extensive disease; Total care; intake reduced; LOC full/confusion  20%   [] Bed Bound; Extensive disease; Total care; intake minimal; Drowsy/coma  10%   [] Bed Bound; Extensive disease; Total care; Mouth care only; Drowsy/coma    PPS 40%    Symptom Assessment: Appetite/Nausea/Bowels/Fatigue:     lbs. BMI 26.17  Current Albumin 2.2. Decreased appetite over last (3) weeks. Ascites present. Radiology removed  7100 ml. fluid from paracentesis on 10/20/20. Wt Readings from Last 3 Encounters:   10/20/20 172 lb 1.6 oz (78.1 kg)   06/25/20 170 lb (77.1 kg)   06/23/20 174 lb (78.9 kg)     Social History:   reports that he quit smoking about 4 years ago. His smoking use included cigars. He has a 13.75 pack-year smoking history. He has never used smokeless tobacco. He reports current alcohol use of about 1.0 standard drinks of alcohol per week. Family History:  family history includes Cancer in his brother and father; Emphysema in his mother; Obesity in his brother. Psychological/Spiritual:    for 60 years this year. No Children. Gnosticist Restorationist. Family Discussion:     Patient laying in bed. Alert and orientated X3. Remains lethargic from earlier procedure of paracentesis. 02@ 1L via NC for comfort. IV fluids going. BP remains low @ 88/48 currently. Discussed overall history of patient disease process and interventions up to this date per wife. Quite the journey since 1995 with [facility] Kwaku. 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 Unable to work over last (3) weeks due to endurance.

## 2020-10-21 NOTE — DISCHARGE INSTR - COC
Continuity of Care Form    Patient Name: Vinita Dutta   :    MRN:  [id number]    Admit date:  10/19/2020  Discharge date:  ***    Code Status Order: DNR-CC   Advance Directives:      Admitting Physician:  Eric Sparks MD  PCP: Sarah Sorensen MD    Discharging Nurse: [facility] Unit/Room#: YZH-5376/1547-75  Discharging Unit Phone Number: ***    Emergency Contact:   Extended Emergency Contact Information  Primary Emergency Contact: Adron Lesch of [address] [place] [address] Phone: [phone]  Relation: Spouse    Past Surgical History:  Past Surgical History:   Procedure Laterality Date    APPENDECTOMY      BRONCHOSCOPY  3/11/2016    biopsy    BRONCHOSCOPY N/A 2018    EYE SURGERY      cataracts with lens placement bilat    LUNG SURGERY Left     65% of left lung tumor removed    TONSILLECTOMY      UPPER GASTROINTESTINAL ENDOSCOPY N/A 2019    EGD BIOPSY performed by Ana Lamas MD at [facility] 2019    EGD BIOPSY performed by Ana Lamas MD at [id number] [place] [address] ENDOSCOPY       Immunization History: There is no immunization history on file for this patient.     Active Problems:  Patient Active Problem List   Diagnosis Code    Essential hypertension, benign I10    Peptic ulcer K27.9    Shortness of breath R06.02    Lung mass R91.8    Hilar mass R91.8    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    Malignant melanoma of skin of left upper extremity, including shoulder (HCC) C43.62    Drug-induced fever R50.2    Rigors R68.89    Encounter for antineoplastic immunotherapy Z51.12    Mediastinal adenopathy R59.0    Malignant melanoma (HCC) C43.9    Pneumonitis J18.9    Pneumonitis J96.21    Pulmonary nodules R91.8    Dyspnea R06.00    Small bowel obstruction ([address] K56.609       Isolation/Infection:   Isolation            No Isolation          Patient Infection Status       Infection Onset Added Last Indicated Last example:[id number]:::0}  Last Modified Barium Swallow with Video (Video Swallowing Test): {Done Not Done [facility]:[id number]:::9}    Treatments at the Time of Hospital Discharge:   Respiratory Treatments: ***  Oxygen Therapy:  {Therapy; copd oxygen:74497:::0}  Ventilator:    {[facility] Vent List:339013770:::0}    Rehab Therapies: {THERAPEUTIC INTERVENTION:2861012957}  Weight Bearing Status/Restrictions: {[facility] Weight Bearin:::0}  Other Medical Equipment (for information only, NOT a DME order):  {EQUIPMENT:041757089}  Other Treatments: ***    Patient's personal belongings (please select all that are sent with patient):  {[facility] DME Belongings:380456998:::0}    RN SIGNATURE:  {Esignature:904224287:::0}    CASE MANAGEMENT/SOCIAL WORK SECTION    Inpatient Status Date: ***    Readmission Risk Assessment Score:  Readmission Risk              Risk of Unplanned Readmission:        24           Discharging to Facility/ Agency   Name:   Address:  Phone:  Fax:    Dialysis Facility (if applicable)   Name:  Address:  Dialysis Schedule:  Phone:  Fax:    / signature: {Esignature:[id number]:::0}    PHYSICIAN SECTION    Prognosis: Poor    Condition at Discharge: Terminal    Rehab Potential (if transferring to Rehab): Poor    Recommended Labs or Other Treatments After Discharge: none    Physician Certification: I certify the above information and transfer of Wally Poole  is necessary for the continuing treatment of the diagnosis listed and that he requires Hospice for greater 30 days.      Update Admission H&P: No change in H&P    PHYSICIAN SIGNATURE:  Electronically signed by Eimlie Pate MD on 10/21/20 at 1:11 PM EDT

## 2020-10-21 NOTE — CARE COORDINATION
Discharge planning note:    [address] referral.  No bed open at [facility] today. Plan on dc tomorrow morning to [facility] [place].     Rosaleen Mcburney RN BSN  Case Management  [phone]

## 2020-10-21 NOTE — PROGRESS NOTES
Hospitalist Progress Note      PCP: Roula Mendoza MD    Date of Admission: 10/19/2020    Chief Complaint: Dyspnea fatigue abdominal distention    Hospital Course:   Abdomen again distended  Looks pretty much like expected IV started  Blood pressure in 70s  Complains of some shortness of breath no chest pain        Medications:  Reviewed      Exam:    BP (!) 88/49   Pulse 73   Temp 98.1 °F (36.7 °C) (Temporal)   Resp 16   Ht 5' 8\" (1.727 m)   Wt 135 lb 12.9 oz (61.6 kg)   SpO2 95%   BMI 20.65 kg/m²     General appearance: No apparent distress, appears stated age and cooperative. HEENT: Pupils equal, round, and reactive to light. Conjunctivae/corneas clear. Neck: Supple, with full range of motion. No jugular venous distention. Trachea midline. Respiratory: Decreased air entry at lung bases  Cardiovascular: Regular rate and rhythm with normal S1/S2 without MURMURS, rubs or gallops. Abdomen: Distended mild generalized tenderness musculoskeletal: No clubbing, cyanosis or EDEMA bilaterally. Full range of motion without deformity. Skin: Skin color, texture, turgor normal.  No rashes or lesions. Neurologic:  Neurovascularly intact without any focal sensory/motor deficits. Cranial nerves: II-XII intact, grossly non-focal.        Labs:   Recent Labs     10/19/20  1416 10/20/20  0442 10/21/20  0457   WBC 12.0* 11.7* 9.2   HGB 14.5 12.6* 11.5*   HCT 43.9 38.1* 34.5*    329 210     Recent Labs     10/19/20  1416  10/20/20  1126 10/20/20  1736 10/21/20  0457   *  129*   < > 126* 126* 124*   K 6.7*  6.6*   < > 5.7* 5.6* 5.0   CL 98*  100   < > 95* 94* 94*   CO2 10*  12*   < > 18* 18* 16*   BUN 77*  75*   < > 76* 76* 77*   CREATININE 3.4*  3.3*   < > 3.4* 3.4* 3.5*   CALCIUM 9.3  9.0   < > 8.0* 7.9* 7.7*   PHOS 5.6*  --   --   --  4.8    < > = values in this interval not displayed.      Recent Labs     10/19/20  1416 10/20/20  0442   AST 18 20   ALT 7* 6*   BILIDIR <0.2 <0.2   BILITOT 0.4 0. 3   ALKPHOS 218* 173*     Recent Labs     10/19/20  1416 10/20/20  0442   INR 1.03 1.05     Recent Labs     10/19/20  1903 10/20/20  0135   TROPONINI <0.01 <0.01       Urinalysis:      Lab Results   Component Value Date    NITRU Negative 10/19/2020    WBCUA 5 10/19/2020    RBCUA 3-4 10/19/2020    BLOODU Negative 10/19/2020    SPECGRAV 1.018 10/19/2020    GLUCOSEU Negative 10/19/2020       Radiology:  XR ABDOMEN (KUB) (SINGLE AP VIEW)   Final Result   Persistent distention of small bowel. Persistent ascites         IR US GUIDED PARACENTESIS   Final Result   Successful ultrasound guided paracentesis. CT ABDOMEN PELVIS WO CONTRAST Additional Contrast? None   Final Result   Dilated loops of small bowel throughout the anterior, central, and midline   abdomen suspicious for obstruction, though evaluation for a discrete   transition point is limited by the absence of contrast and the volume of   intraperitoneal ascites. Extensive metastatic disease with diffuse peritoneal nodularity presumed on   the basis of carcinomatosis, new from prior comparison evaluation 5 months   prior. .      Pathologic lymphadenopathy throughout the abdomen and pelvis, slightly   decreased in bulk in the retroperitoneum and increased throughout the   mesentery relative to examination from 5 months prior. The adenopathy within   the partially imaged lower chest is grossly stable. Large volume simple ascites, new from prior comparison. New small simple right pleural effusion. Asymmetric enlargement and soft tissue stranding within the right pectoralis   subcutaneous soft tissues. Correlate with physical exam for cellulitis or   other focal abnormality. XR CHEST PORTABLE   Final Result   Right basilar airspace disease, atelectasis versus pneumonia with   questionable small right-sided pleural effusion.          IR US GUIDED PARACENTESIS    (Results Pending)       Assessment/Plan:    Active Hospital Problems Diagnosis Date Noted    Small bowel obstruction ([facility] [address] [K56.609]     Dyspnea [R06.00] 10/19/2020       Acute Medical Issues Being Addressed:    69-year-old admitted to the hospital complaining of shortness of breath abdominal pain and distention    Dyspnea suspect multifactorial due to an underlying pneumonia gram-positive/gram-negative and worsening abdominal distention  Breathing seems to have improved since the paracentesis  Currently on vancomycin and meropenem      Abdominal distention secondary to ascites probably related to his underlying malignancy  S/p paracentesis 7 L with reaccumulation of the fluid    Sepsis secondary to pneumonia as above  Lactate seems to have improved  He is on IV hydrocortisone  Continue broad-spectrum antibiotics    Small bowel obstruction probably related to his underlying malignancy and ascites  Abdominal examination is benign  General surgery consulted  KUB shows persistent ileus    Metastatic melanoma  Seen by oncology  Has had multiple rounds of chemotherapy    Acute kidney injury suspect prerenal versus ATN with anion gap metabolic acidosis  Nephrology consulted  On IV bicarb drip  Urine output 425 mL    I spoke to the patient and explained that at this point all his acute medical issues are being driven by his underlying cancer with reaccumulation of the fluid and there may not be many options to help him  Oncology spoke to the wife who has expressed interest in hospice  We will let hospice see patient and if everybody agreeable we will transfer him to inpatient hospice later today his prognosis is very poor  We will see if interventional radiology is willing to do a therapeutic to assist with his symptoms    DVT Prophylaxis: sc heparin   Diet: DIET CLEAR LIQUID;  Code Status: DNR-CC      Dispo - once acute medical processes have resolved    Johnnie Cantor MD

## 2020-10-21 NOTE — PROGRESS NOTES
Assessment complete, vitals obtained. Pt A/O x4. C/o back pain 4/10 and abdominal pain 3/10. One time dose of morphine ordered by Dr Tania Hawk however pt wanting to try tylenol and repositioning first. PRN tylenol and due meds given, see MAR, pt on 1L NC, denies SOB at rest but is SOB w/ exertion. Lungs clear w/ crackles to lower lobes. Abdomen largely distended. + bowel sounds. Tender. Skin warm and dry. Pulses palpable. Irvin draining. Pt declines assistance repositioning. Pt denies acute needs, call light in reach.  Safety precautions in place

## 2020-10-21 NOTE — CARE COORDINATION
Discharge planning note:    Referral called to [facility] - family is interested in Richard RN BSN  Case Management  [phone]

## 2020-10-21 NOTE — PROGRESS NOTES
[address] [facility]        Progress Note    Patient Name: Hadley Goodwin  MRN: [id number]  YOB: 1940  Date of Evaluation: 10/21/2020    Chief Complaint: Abdominal distention    Subjective:  Hypotension overnight and discussion of central line placement for pressors but wife expressed not wanting extraordinary measures, code status changed to [facility], and plans to enroll in Hospice care  Pain controlled  Increased distention overnight since paracentesis  Denies nausea but couple episodes of vomiting  Resting in bed at this time      Vital Signs:  Patient Vitals for the past 24 hrs:   BP Temp Temp src Pulse Resp SpO2 Weight   10/21/20 1000 (!) 75/42 -- -- 69 16 -- --   10/21/20 0815 -- -- -- -- -- 96 % --   10/21/20 0800 8169 97.9 °F (36.6 °C) Temporal 75 17 98 % --   10/21/20 0600 (!) 42 -- -- 71 14 -- 135 lb 12.9 oz (61.6 kg)   10/21/20 0500 (!) 83/43 -- -- 68 13 -- --   10/21/20 0400 (!) 8350 97.9 °F (36.6 °C) Temporal 73 18 98 % --   10/21/20 0300 (!) 83/58 -- -- 72 14 -- --   10/21/20 0200 (!) 81/48 -- -- 80 23 97 % --   10/21/20 0030 (!) 93/46 -- -- 74 17 -- --   10/21/20 0000 (!) 80/38 98 °F (36.7 °C) Temporal 72 16 97 % --   10/20/20 2300 (!) 78/44 -- -- 73 15 -- --   10/20/20 2130 (!) 91/50 -- -- 75 21 -- --   10/20/20 2105 (!) 88/42 -- -- 76 20 -- --   10/20/20 2000 (!) 84/46 97.9 °F (36.6 °C) Temporal -- -- 94 % --   10/20/20 1900 (!) 97/50 -- -- 75 17 -- --   10/20/20 1700 (!) 99/53 -- -- 77 19 -- --   10/20/20 1600 (!) 99/53 97.5 °F (36.4 °C) Temporal 75 19 97 % --   10/20/20 1500 (!) 78/43 -- -- 79 18 -- --   10/20/20 1400 (!) 90/47 -- -- 67 19 -- --   10/20/20 1300 (!) 78/44 -- -- 72 19 -- --      TEMPERATURE HISTORY 24H: Temp (24hrs), Av.8 °F (36.6 °C), Min:97.5 °F (36.4 °C), Max:98 °F (36.7 °C)    BLOOD PRESSURE HISTORY: Systolic (95HFQ), KUR:43 , Min:75 , ROBIN:57    Diastolic (85CFV), MN, Min:38, Max:76      Intake/Output:  I/O last 3 completed shifts: In: 2628 [P.O.:120; I.V.:4751; IV Piggyback:450]  Out: 425 [Urine:425]  No intake/output data recorded. Drain/tube Output:       Physical Exam:  General: awake, alert, oriented to  person, place, time  Cardiovascular:  regular rate and rhythm and no murmur noted  Lungs: clear to auscultation  Abdomen: soft, nontender, distended, hypoactive bowel sounds    Labs:  CBC:    Recent Labs     10/19/20  1416 10/20/20  0442 10/21/20  0457   WBC 12.0* 11.7* 9.2   HGB 14.5 12.6* 11.5*   HCT 43.9 38.1* 34.5*    329 210     BMP:    Recent Labs     10/20/20  1126 10/20/20  1736 10/21/20  0457   * 126* 124*   K 5.7* 5.6* 5.0   CL 95* 94* 94*   CO2 18* 18* 16*   BUN 76* 76* 77*   CREATININE 3.4* 3.4* 3.5*   GLUCOSE 119* 125* 102*     Hepatic:    Recent Labs     10/19/20  1416 10/20/20  0442   AST 18 20   ALT 7* 6*   BILITOT 0.4 0.3   ALKPHOS 218* 173*     Amylase:  No results found for: AMYLASE  Lipase:    Lab Results   Component Value Date    LIPASE 42.0 10/19/2020      Mag:    Lab Results   Component Value Date    MG 2.50 10/21/2020    MG 1.80 07/23/2019     Phos:     Lab Results   Component Value Date    PHOS 4.8 10/21/2020    PHOS 5.6 10/19/2020      Coags:   Lab Results   Component Value Date    PROTIME 12.2 10/20/2020    INR 1.05 10/20/2020    APTT 30.0 06/25/2020       Cultures:  Anaerobic culture  No results found for: LABANAE  Fungus stain  No results found for requested labs within last 30 days. Gram stain  No results found for requested labs within last 30 days. Organism  No results found for: [facility]  Surgical culture  No results found for: CXSURG  Blood culture 1  Results in Past 30 Days  Result Component Current Result Ref Range Previous Result Ref Range   Blood Culture, Routine No Growth to date. Any change in status will be called.  (10/19/2020)  Not in Time Range      Blood culture 2  Results in Past 30 Days  Result Component Current Result Ref Range Previous Result Ref Range   Culture, Blood 2 No Growth to date. Any change in status will be called. (10/19/2020)  Not in Time Range      Fecal occult  No results found for requested labs within last 30 days. GI bacterial pathogens by PCR  No results found for requested labs within last 30 days. C. difficile  No results found for requested labs within last 30 days. Urine culture  Lab Results   Component Value Date    Adri Cline See Report 08/25/2016       Pathology:  \"Relevant pathology documented under results section     Imaging:  I have personally reviewed the following films:    Ct Abdomen Pelvis Wo Contrast Additional Contrast? None    Result Date: 10/19/2020  EXAMINATION: CT OF THE ABDOMEN AND PELVIS WITHOUT CONTRAST 10/19/2020 12:41 pm TECHNIQUE: CT of the abdomen and pelvis was performed without the administration of intravenous contrast. Multiplanar reformatted images are provided for review. Dose modulation, iterative reconstruction, and/or weight based adjustment of the mA/kV was utilized to reduce the radiation dose to as low as reasonably achievable. COMPARISON: Chest radiograph from earlier on today's date, CTs of the chest abdomen and pelvis from September 17, 2020 06/11/2020, and 03/20/2020 HISTORY: ORDERING SYSTEM PROVIDED HISTORY: abdominal pain, distention, metastatic disease TECHNOLOGIST PROVIDED HISTORY: Reason for exam:->abdominal pain, distention, metastatic disease Additional Contrast?->None Reason for Exam: abdominal pain, distention, metastatic disease Acuity: Unknown Type of Exam: Unknown FINDINGS: Lower Chest: Similar regions of ground-glass and reticulation throughout the partially imaged right lung with scattered calcified granulomata. Small simple layering right pleural effusion, new from prior. Persistent adenopathy most conspicuous in the Elizabeth off a geode region.   Evaluation is limited by the absence of contrast. Organs: Evaluation limited by the absence of contrast.  Liver appears grossly homogeneous in attenuation. Layering hyperdense likely sludge within the otherwise grossly unremarkable gallbladder. Calcified granulomata within the normal-sized spleen. Pancreatic parenchymal atrophy, unchanged. Adrenal glands unchanged relative to prior with some nonspecific nodularity on the right. Kidneys mildly atrophic with an exophytic cyst on the left and similar perinephric stranding without stones or hydronephrosis. GI/Bowel: Dilated loops of small bowel measuring up to 4 cm are present throughout the anterior and central abdomen, though evaluation for a discrete transition point is limited by the absence of contrast and the volume of intraperitoneal ascites. The colon is relatively decompressed containing enteric contrast.  Patulous appearance of the cecum. Diverticulosis predominating in the distal descending and sigmoid colon. Pelvis: Large volume simple ascites. Urinary bladder are, prostate, and seminal vesicles are grossly unremarkable. Pathologic pelvic sidewall adenopathy, worse on the left. Peritoneum/Retroperitoneum: Large volume of simple intraperitoneal ascites on a background of omental nodularity and retroperitoneal and mesenteric adenopathy. The bulk of the retroperitoneal adenopathy has decreased in caliber, but the mesenteric adenopathy has increased. Irregular appearing collection of air in the right lower quadrant, though this appears to be contained within the patulous cecum without convincing evidence for pneumoperitoneum. Bones/Soft Tissues: Diffuse osseous demineralization and degenerative changes. Diffuse subcutaneous edema. Asymmetric enlargement of the right breast relative to the left on a background of mild bilateral gynecomastia. Dilated loops of small bowel throughout the anterior, central, and midline abdomen suspicious for obstruction, though evaluation for a discrete transition point is limited by the absence of contrast and the volume of intraperitoneal ascites. Extensive metastatic disease with diffuse peritoneal nodularity presumed on the basis of carcinomatosis, new from prior comparison evaluation 5 months prior. . Pathologic lymphadenopathy throughout the abdomen and pelvis, slightly decreased in bulk in the retroperitoneum and increased throughout the mesentery relative to examination from 5 months prior. The adenopathy within the partially imaged lower chest is grossly stable. Large volume simple ascites, new from prior comparison. New small simple right pleural effusion. Asymmetric enlargement and soft tissue stranding within the right pectoralis subcutaneous soft tissues. Correlate with physical exam for cellulitis or other focal abnormality. Xr Abdomen (kub) (single Ap View)    Result Date: 10/20/2020  EXAMINATION: ONE SUPINE XRAY VIEW(S) OF THE ABDOMEN 10/20/2020 2:02 pm COMPARISON: October 19, 2020 HISTORY: ORDERING SYSTEM PROVIDED HISTORY: abdominal distention TECHNOLOGIST PROVIDED HISTORY: Reason for exam:->abdominal distention Reason for Exam: Abdominal distention Acuity: Unknown Type of Exam: Unknown FINDINGS: There is persistence distension the small bowel. There is loss properitoneal fat lines abdominal ascites. Free air cannot be excluded on supine film. Persistent distention of small bowel. Persistent ascites     Xr Chest Portable    Result Date: 10/19/2020  EXAMINATION: ONE XRAY VIEW OF THE CHEST 10/19/2020 2:37 pm COMPARISON: Chest CT September 17, 2020. HISTORY: ORDERING SYSTEM PROVIDED HISTORY: SOB, hypotension, hx lung cancer TECHNOLOGIST PROVIDED HISTORY: Reason for exam:->SOB, hypotension, hx lung cancer Reason for Exam: Shortness of Breath (pt states he has lung CA, increase SOB, worsening today. pt was suppose to be a direct admit but no beds were available. pt currently on PO chemo. pt having abd distention for several days) Acuity: Chronic Type of Exam: Ongoing FINDINGS: There is mild elevation of the right hemidiaphragm.   There is questionable small right-sided pleural effusion. There is mild right basilar airspace disease. Calcified granulomas again seen in the left lower lung. No pneumothorax. Cardiac and mediastinal contours are without acute process. No acute osseous abnormality. Right basilar airspace disease, atelectasis versus pneumonia with questionable small right-sided pleural effusion. Ir Us Guided Paracentesis    Result Date: 10/20/2020  PROCEDURE: ULTRASOUND GUIDED PARACENTESIS 10/20/2020 HISTORY: ORDERING SYSTEM PROVIDED HISTORY: ascites TECHNOLOGIST PROVIDED HISTORY: Reason for exam:->ascites Reason for exam:->Therapeutic paracentesis TECHNIQUE: Informed consent was obtained after a detailed explanation of the procedure including risks, benefits, and alternatives. Universal protocol was followed. The right abdomen was prepped and draped in sterile fashion and local anesthesia was achieved with lidocaine. A 5 Greek needle sheath was advanced under ultrasound guidance into ascites and paracentesis was performed. The patient tolerated the procedure well. Estimated blood loss: Less than 5 cc FINDINGS: A total of 7.1 L was removed. Successful ultrasound guided paracentesis. Scheduled Meds:   ondansetron  4 mg Intravenous Q6H    Or    promethazine  12.5 mg Oral Q6H    meropenem  500 mg Intravenous Q12H    albumin human  25 g Intravenous Q6H    morphine  1 mg Intravenous Once    hydrocortisone (SOLU-CORTEF) IVPB  100 mg Intravenous Q8H    sodium chloride flush  10 mL Intravenous 2 times per day    heparin (porcine)  5,000 Units Subcutaneous 3 times per day    vancomycin (VANCOCIN) intermittent dosing (placeholder)   Other RX Placeholder     Continuous Infusions:   sodium bicarbonate infusion Stopped (10/21/20 0915)     PRN Meds:.sodium chloride flush, acetaminophen **OR** acetaminophen, polyethylene glycol      Assessment:  79 y.o. male admitted with   1. Small bowel obstruction ([address]    2.  Acute

## 2020-10-21 NOTE — PROGRESS NOTES
[facility] at bedside. No open bed at [address] [facility]. Discharge plan for tomorrow.   Kaya Garcia RN

## 2020-10-21 NOTE — PROGRESS NOTES
Clinical Pharmacy Note: Pharmacy to Dose Vancomcyin    Vancomycin Day: 3  Current Dosing: intermittent - dosing by levels      Recent Labs     10/20/20  1736 10/21/20  0457   BUN 76* 77*       Recent Labs     10/20/20  1736 10/21/20  0457   CREATININE 3.4* 3.5*       Recent Labs     10/20/20  0442 10/21/20  0457   WBC 11.7* 9.2         Intake/Output Summary (Last 24 hours) at 10/21/2020 1249  Last data filed at 10/21/2020 0617  Gross per 24 hour   Intake 5321 ml   Output 425 ml   Net 4896 ml         Ht Readings from Last 1 Encounters:   10/19/20 5' 8\" (1.727 m)        Wt Readings from Last 1 Encounters:   10/21/20 135 lb 12.9 oz (61.6 kg)         Body mass index is 20.65 kg/m². Estimated Creatinine Clearance: 15 mL/min (A) (based on SCr of 3.5 mg/dL (H)). Random: 5.3    Assessment/Plan:  Vancomycin level is subtherapeutic. Level was drawn appropriately in respect to last dose given. A vancomycin random level has been ordered for 10/22 0600. Will re-dose vancomycin 1250 mg IV ONCE  Changes in regimen will be determined based on culture results, renal function, and clinical response. Pharmacy will continue to monitor and adjust regimen as necessary.     Thank you for the consult,    Leeroy Fletcher, PharmD, [id number] Pharmacist  [id number]    10/21/2020

## 2020-10-21 NOTE — PLAN OF CARE
Problem: Pain:  Goal: Pain level will decrease  Description: Pain level will decrease  Outcome: Ongoing  Goal: Control of acute pain  Description: Control of acute pain  Outcome: Ongoing  Goal: Control of chronic pain  Description: Control of chronic pain  Outcome: Ongoing  Goal: Patient's pain/discomfort is manageable  Description: Patient's pain/discomfort is manageable  Outcome: Ongoing     Problem: Skin Integrity:  Goal: Will show no infection signs and symptoms  Description: Will show no infection signs and symptoms  Outcome: Ongoing  Goal: Absence of new skin breakdown  Description: Absence of new skin breakdown  Outcome: Ongoing     Problem: Infection:  Goal: Will remain free from infection  Description: Will remain free from infection  Outcome: Ongoing     Problem: Safety:  Goal: Free from accidental physical injury  Description: Free from accidental physical injury  Outcome: Ongoing  Goal: Free from intentional harm  Description: Free from intentional harm  Outcome: Ongoing     Problem: Daily Care:  Goal: Daily care needs are met  Description: Daily care needs are met  Outcome: Ongoing     Problem: Skin Integrity:  Goal: Skin integrity will stabilize  Description: Skin integrity will stabilize  Outcome: Ongoing     Problem: Discharge Planning:  Goal: Patients continuum of care needs are met  Description: Patients continuum of care needs are met  Outcome: Ongoing
Problem: Pain:  Goal: Pain level will decrease  Description: Pain level will decrease  Outcome: Ongoing  Goal: Control of acute pain  Description: Control of acute pain  Outcome: Ongoing  Goal: Control of chronic pain  Description: Control of chronic pain  Outcome: Ongoing  Goal: Patient's pain/discomfort is manageable  Description: Patient's pain/discomfort is manageable  Outcome: Ongoing     Problem: Skin Integrity:  Goal: Will show no infection signs and symptoms  Description: Will show no infection signs and symptoms  Outcome: Ongoing  Goal: Absence of new skin breakdown  Description: Absence of new skin breakdown  Outcome: Ongoing     Problem: Infection:  Goal: Will remain free from infection  Description: Will remain free from infection  Outcome: Ongoing     Problem: Safety:  Goal: Free from accidental physical injury  Description: Free from accidental physical injury  Outcome: Ongoing  Goal: Free from intentional harm  Description: Free from intentional harm  Outcome: Ongoing     Problem: Daily Care:  Goal: Daily care needs are met  Description: Daily care needs are met  Outcome: Ongoing     Problem: Skin Integrity:  Goal: Skin integrity will stabilize  Description: Skin integrity will stabilize  Outcome: Ongoing     Problem: Discharge Planning:  Goal: Patients continuum of care needs are met  Description: Patients continuum of care needs are met  Outcome: Ongoing
Passing flatus and stool, denies nausea or vomiting despite persistent dilation on AXR; no plans for surgical intervention at this time  2. Trail clear liquid diet as tolerated  3. IV hydration until PO intake is adequate; monitor and correct electrolytes  4. Activity as tolerated  5. PRN analgesics and antiemetics--minimizing narcotics as tolerated  6. Management of medical comorbid etiologies per primary team and consulting services  7. Disposition:  Has declined significantly over last few week; palliative is following    EDUCATION:  Educated patient on plan of care and disease process--all questions answered. Plans discussed with patient and nursing. Reviewed and discussed with Dr. Isa Cruz consult to follow.       Signed:  Radha Santos, APRN - CNP  10/20/2020 2:28 PM     80-year-old male with progressive metastatic malignant melanoma who we were asked to see regarding a possible small bowel obstruction  No nausea or vomiting  Having bowel function  Most recent CAT scan is somewhat difficult to interpret with regards to a small bowel obstruction because of the massive ascites  He is status post paracentesis where they removed 7 L of fluid  Subsequent plain films today are most consistent with an ileus  If the patient were to have a small bowel obstruction or progressed to a small bowel obstruction, would recommend palliative measures only as opposed to abdominal exploration  Trial clear liquids today  Patient is hospice appropriate  Will follow with you

## 2020-10-21 NOTE — PROGRESS NOTES
Reassessment complete, vitals obtained. Pt frequently on bedpan through night w/ watery stools. Pt had emesis of 300 ml. Pt continues to deny nausea, PRN zofran administered for vomiting, see MAR. Ab distended & taut.

## 2020-10-21 NOTE — PROGRESS NOTES
P Pulmonary [facility] Care   Progress Note      Reason for Consult: Shortness of breath, pneumonia  Requesting Physician: Dr. Immanuel Abreu  Subjective:   [facility] / HPI:                The patient is a 79 y.o. male with significant past medical history of:      Diagnosis Date    Allergic     Arthritis     Hypertension     Lung mass     left    Melanoma ([address]     left back lyndsey geraldine; mets to lung    Obesity     PUD (peptic ulcer disease)     Skin cancer     Wheezing     since January     Inteval History: He is alert. He has re-developed tense ascites.       Past Surgical History:        Procedure Laterality Date    APPENDECTOMY      BRONCHOSCOPY  3/11/2016    biopsy    BRONCHOSCOPY N/A 06/08/2018    EYE SURGERY      cataracts with lens placement bilat    LUNG SURGERY Left     65% of left lung tumor removed    TONSILLECTOMY      UPPER GASTROINTESTINAL ENDOSCOPY N/A 5/2/2019    EGD BIOPSY performed by Ana Lamas MD at [address] [facility] N/A 7/9/2019    EGD BIOPSY performed by Ana Lamas MD at [address]     Current Medications:    Current Facility-Administered Medications: promethazine (PHENERGAN) tablet 12.5 mg, 12.5 mg, Oral, Q6H **OR** ondansetron (ZOFRAN) injection 4 mg, 4 mg, Intravenous, Q6H  meropenem (MERREM) 500 mg IVPB (mini-bag), 500 mg, Intravenous, Q12H  albumin human 25 % IV solution 25 g, 25 g, Intravenous, Q6H  morphine (PF) injection 1 mg, 1 mg, Intravenous, Once  sodium bicarbonate 50 mEq in dextrose 5 % 1,000 mL infusion, , Intravenous, Continuous  hydrocortisone sodium succinate PF (SOLU-CORTEF) 100 mg in dextrose 5 % 50 mL IVPB, 100 mg, Intravenous, Q8H  sodium chloride flush 0.9 % injection 10 mL, 10 mL, Intravenous, 2 times per day  sodium chloride flush 0.9 % injection 10 mL, 10 mL, Intravenous, PRN  acetaminophen (TYLENOL) tablet 650 mg, 650 mg, Oral, Q6H PRN **OR** acetaminophen (TYLENOL) suppository 650 mg, 650 mg, Rectal, Q6H PRN  polyethylene glycol (GLYCOLAX) packet 17 g, 17 g, Oral, Daily PRN  heparin (porcine) injection 5,000 Units, 5,000 Units, Subcutaneous, 3 times per day  vancomycin (VANCOCIN) intermittent dosing (placeholder), , Other, RX Placeholder    Allergies   Allergen Reactions    Iodides Itching     Patient gets Medrol (methyl-prednisolone) pre-medication for IV Contrast studies        Social History:    TOBACCO:   reports that he quit smoking about 4 years ago. His smoking use included cigars. He has a 13.75 pack-year smoking history. He has never used smokeless tobacco.  ETOH:   reports current alcohol use of about 1.0 standard drinks of alcohol per week. Patient currently lives independently  Environmental/chemical exposure: None known    Family History:       Problem Relation Age of Onset    Emphysema Mother     Cancer Father     Cancer Brother     Obesity Brother      REVIEW OF SYSTEMS:    CONSTITUTIONAL:  negative for fevers, chills, diaphoresis, activity change, appetite change, fatigue, night sweats and unexpected weight change.    EYES:  negative for blurred vision, eye discharge, visual disturbance and icterus  HEENT:  negative for hearing loss, tinnitus, ear drainage, sinus pressure, nasal congestion, epistaxis and snoring  RESPIRATORY:  See HPI  CARDIOVASCULAR:  negative for chest pain, palpitations, exertional chest pressure/discomfort, edema, syncope  GASTROINTESTINAL:  negative for nausea, vomiting, diarrhea, constipation, blood in stool and abdominal pain  GENITOURINARY:  negative for frequency, dysuria, urinary incontinence, decreased urine volume, and hematuria  HEMATOLOGIC/LYMPHATIC:  negative for easy bruising, bleeding and lymphadenopathy  ALLERGIC/IMMUNOLOGIC:  negative for recurrent infections, angioedema, anaphylaxis and drug reactions  ENDOCRINE:  negative for weight changes and diabetic symptoms including polyuria, polydipsia and polyphagia  MUSCULOSKELETAL:  negative for BUN 76* 76* 77*   CREATININE 3.4* 3.4* 3.5*   CALCIUM 8.0* 7.9* 7.7*   GLUCOSE 119* 125* 102*      ABG:  No results for input(s): PHART, FLS4BPJ, PO2ART, NBH1RFP, H7VLCXYV, BEART in the last 72 hours. No results found for: BNP  Lab Results   Component Value Date    CKTOTAL 94 01/23/2019    TROPONINI <0.01 10/20/2020       Cultures:     Abx:    Radiology Review:  All pertinent images / reports were reviewed as a part of this visit. Assessment:     1. Metastatic melanoma  2. Ascites  3. Pneumonia    · I have reviewed laboratories, medical records and images for this visit  · Re-developed tense ascites  · Planning transfer to inpatient hospice later today  · Planning another paracentesis to make transfer more comfortable for him.

## 2020-10-21 NOTE — PROGRESS NOTES
Pt w/ 100 ml green emesis. Pt denies nausea just says he was trying to clear his throat and got some mucous up and the emesis followed. States he thinks hers having sinus drainage. Offered nausea medication pt declined.

## 2020-10-21 NOTE — PROGRESS NOTES
MD Alberta Odell MD Lorenso Lao, MD               Office: [phone]                      Fax: [phone]             [facility]                   NEPHROLOGY ICU PROGRESS NOTE:     PATIENT NAME: Vinita Dutta  : 1940  MRN: [id number]       RECOMMENDATIONS:     -stop bicarb fluids   - f/u paracentesis results,    - was LVP w/ ~7L removed -> so giving IV albumin -> increase to Q6h -continue today     - K better    - no K-binder use, small bowel obstruction   - s/p I/D + bicarb    - ok to check labs in am, as likely plans for hospice     - keep bonds   - f/u 1' oncology, holding Nivolumab     - no need for dialysis ,    - pt's wife has said no for dialysis,   - poor candidate for long term dialysis w/ poor oncology prognosis. - ICU admission, as at higher risk for decompensation, needing closer monitoring    D/C plan: unclear,         IMPRESSION:       ATUL (on CKD-3A): Oliguric, severe - plateau now   - BL Scr- 0.6 ---> 3.3 on admission  : Etiology of ATUL - pre-renal / ATN : w/ hypotension 70s/50s    - other differentials: r/o obstruction   - Renal imaging: CT on 10/12: Development of massive ascites and extensive changes consistent with carcinomatosis. : Hypovolemic (per urine lytes) -> ischemic ATN - remains plateau   + UOP ~ 016 cc still     Associated problems:   - Azotemia: severe in to 70s - pre-renal - improving, but not resolving yet   - Electrolytes: K: 6.6 - hyperkalemia  - better   - Volume status: hypo-volemic : not resolving   : Na: hyponatremia - moderate   : BP: Hypotension 70/50s in ER - better   - Acid-Base: severe, 12, non-AG - metabolic, LA 3.5 on admission - better   - Anemia: none      : other supportive care :   - Check daily renal function panel with electrolytes-phosphorus  - Strict monitoring of I/Os, daily weight  - Renal feeds/diet  - Current medications reviewed. - Nephrotoxic medications have been discontinued.     - Dose adjusted and appropriate. - Dose meds for eGFR <15 mL/min/1.73m2 during ATUL    - Avoid heavy opioids due to renal failure - may use very low dose dilaudid / fentanyl with close monitoring of CNS and respiratory depression. Other problems: Management per primary and other consulting teams. # H/O MM, ascites, carcinomatosis     # SBO - on conservative mx     Hospital Problems           Last Modified POA    Dyspnea 10/19/2020 Yes    Small bowel obstruction ([address] 10/20/2020 Yes          Please refer to the orders. High Complexity. Multiple complex problems. Discussed with patient, ICU RN , hospitalist - Dr Jeremie Franklin ill, at risk of impending organ failure needing  higher level of care/monitoring. Time spent 32 minutes that included face-to-face meeting/discussion with patient, patient's family , and treatment team (including primary/referring team and other consultants; included coordination of care with the treatment team; and review of patient's electronic medical records and ordering appropriates tests. Thank you for allowing me to participate in this patient's care. Please do not hesitate to contact me with any questions/concerns. We will follow along with you. Andressa Rasheed MD       [facility] [id number] [place] [address] [place]: [phone] or [address]  Fax: [phone]        ========================================================   ========================================================   Subjective:   Seen resting in bed, less awake,  but arousable to loud voice  S/p LVP ~ 7L   Minimal UOP  Past medical, Surgical, Social, Family medical history reviewed by me. MEDICATIONS: reviewed by me.    Medications Prior to Admission:    Current Facility-Administered Medications:     promethazine (PHENERGAN) tablet 12.5 mg, 12.5 mg, Oral, Q6H **OR** ondansetron (ZOFRAN) injection 4 mg, 4 mg, Intravenous, Q6H, Lexie Rowell MD    meropenem (MERREM) 500 mg IVPB (mini-bag), 500 mg, Intravenous, Q12H, Naina Callejas MD, Stopped at 10/21/20 0730    albumin human 25 % IV solution 25 g, 25 g, Intravenous, Q6H, Baron Maritza MD, Stopped at 10/21/20 0411    morphine (PF) injection 1 mg, 1 mg, Intravenous, Once, Louis Lang MD, Stopped at 10/21/20 0657    sodium bicarbonate 50 mEq in dextrose 5 % 1,000 mL infusion, , Intravenous, Continuous, Naina Callejas MD, Last Rate: 150 mL/hr at 10/21/20 0109    hydrocortisone sodium succinate PF (SOLU-CORTEF) 100 mg in dextrose 5 % 50 mL IVPB, 100 mg, Intravenous, Q8H, Naina Callejas MD, Stopped at 10/21/20 0700    sodium chloride flush 0.9 % injection 10 mL, 10 mL, Intravenous, 2 times per day, Naina Callejas MD, 10 mL at 10/20/20 2054    sodium chloride flush 0.9 % injection 10 mL, 10 mL, Intravenous, PRN, Naina Callejas MD    acetaminophen (TYLENOL) tablet 650 mg, 650 mg, Oral, Q6H PRN, 650 mg at 10/20/20 2049 **OR** acetaminophen (TYLENOL) suppository 650 mg, 650 mg, Rectal, Q6H PRN, Naina Callejas MD    polyethylene glycol (GLYCOLAX) packet 17 g, 17 g, Oral, Daily PRN, Naina Callejas MD    heparin (porcine) injection 5,000 Units, 5,000 Units, Subcutaneous, 3 times per day, Naina Callejas MD, 5,000 Units at 10/21/20 0560    vancomycin (VANCOCIN) intermittent dosing (placeholder), , Other, RX Placeholder, Naina Callejas MD    Medications Prior to Admission: aspirin 325 MG tablet, Take 325 mg by mouth daily  predniSONE (DELTASONE) 10 MG tablet, Take 10 mg by mouth daily  Clobetasol Prop Oint-Coal Tar (CLOBETAPLUS OINTMENT EX), Apply topically as needed  acetaminophen (TYLENOL) 500 MG tablet, Take 500 mg by mouth every 6 hours as needed for Pain  Ca Carbonate-Mag Hydroxide (ANTACID EXTRA STRENGTH) 675-135 MG CHEW, Take by mouth as needed  Skin Protectants, Misc. (EUCERIN) cream, Apply topically as needed for Dry Skin Apply topically as needed.   Misc Natural Products (GLUCOSAMINE CHONDROITIN MSM) TABS, Take by mouth daily as needed   triamcinolone (KENALOG) 0.1 % lotion, Apply topically as needed Apply topically 3 times daily. diphenhydrAMINE (BENADRYL) 25 MG capsule, Take 25 mg by mouth as needed for Itching  Nivolumab (OPDIVO IV), Infuse intravenously every 21 days Indications: plus Yervoy     Scheduled Meds:   ondansetron  4 mg Intravenous Q6H    Or    promethazine  12.5 mg Oral Q6H    meropenem  500 mg Intravenous Q12H    albumin human  25 g Intravenous Q6H    morphine  1 mg Intravenous Once    hydrocortisone (SOLU-CORTEF) IVPB  100 mg Intravenous Q8H    sodium chloride flush  10 mL Intravenous 2 times per day    heparin (porcine)  5,000 Units Subcutaneous 3 times per day    vancomycin (VANCOCIN) intermittent dosing (placeholder)   Other RX Placeholder     Continuous Infusions:   sodium bicarbonate infusion 150 mL/hr at 10/21/20 0109     PRN Meds:. REVIEW OF SYSTEMS:  As mentioned in chief complaint and HPI , Subjective        PHYSICAL EXAM:  Recent vital signs and recent I/Os reviewed by me.      Wt Readings from Last 3 Encounters:   10/21/20 135 lb 12.9 oz (61.6 kg)   06/25/20 170 lb (77.1 kg)   06/23/20 174 lb (78.9 kg)     BP Readings from Last 3 Encounters:   10/21/20 81/69   06/25/20 125/71   06/23/20 120/80     Patient Vitals for the past 24 hrs:   BP Temp Temp src Pulse Resp SpO2 Weight   10/21/20 0815 -- -- -- -- -- 96 % --   10/21/20 0800 81/69 97.9 °F (36.6 °C) Temporal 75 17 98 % --   10/21/20 0600 (!) 83/42 -- -- 71 14 -- 135 lb 12.9 oz (61.6 kg)   10/21/20 0500 (!) 83/43 -- -- 68 13 -- --   10/21/20 0400 (!) 83/50 97.9 °F (36.6 °C) Temporal 73 18 98 % --   10/21/20 0300 (!) 83/58 -- -- 72 14 -- --   10/21/20 0200 (!) 81/48 -- -- 80 23 97 % --   10/21/20 0030 (!) 93/46 -- -- 74 17 -- --   10/21/20 0000 (!) 80/38 98 °F (36.7 °C) Temporal 72 16 97 % --   10/20/20 2300 (!) 78/44 -- -- 73 15 -- --   10/20/20 2130 (!) 91/50 -- -- 75 21 -- --   10/20/20 2105 (!) 88/42 -- -- 76 20 -- --   10/20/20 2000 (!) 84/46 97.9 °F (36.6 °C) Temporal -- -- 94 % --   10/20/20 1900 (!) 97/50 -- -- 75 17 -- --   10/20/20 1700 (!) 99/53 -- -- 77 19 -- --   10/20/20 1600 (!) 99/53 97.5 °F (36.4 °C) Temporal 75 19 97 % --   10/20/20 1500 (!) 78/43 -- -- 79 18 -- --   10/20/20 1400 (!) 90/47 -- -- 67 19 -- --   10/20/20 1300 (!) 78/44 -- -- 72 19 -- --   10/20/20 1200 (!) 85/44 97.7 °F (36.5 °C) Temporal 75 19 95 % --   10/20/20 1100 (!) 84/45 -- -- 74 20 -- --   10/20/20 1000 (!) 86/49 -- -- 71 17 -- --   10/20/20 0945 (!) 88/48 98 °F (36.7 °C) Temporal 71 16 97 % --       Intake/Output Summary (Last 24 hours) at 10/21/2020 0835  Last data filed at 10/21/2020 0617  Gross per 24 hour   Intake 5321 ml   Output 425 ml   Net 4896 ml        Physical Exam  Vitals signs reviewed. Constitutional:       General: He is in acute distress (moderate). Appearance: He is cachectic. He is ill-appearing. HENT:      Head: Normocephalic and atraumatic. Right Ear: External ear normal.      Left Ear: External ear normal.      Nose: Nose normal.      Mouth/Throat:      Mouth: Mucous membranes are not dry. Eyes:      General: No scleral icterus. Conjunctiva/sclera: Conjunctivae normal.   Neck:      Musculoskeletal: Normal range of motion and neck supple. Vascular: No JVD. Cardiovascular:      Rate and Rhythm: Regular rhythm. Tachycardia present. Heart sounds: S1 normal and S2 normal. No murmur. Pulmonary:      Effort: Respiratory distress (mild) present. Breath sounds: Rhonchi present. Abdominal:      General: Bowel sounds are normal. There is distension. Palpations: Abdomen is soft. Tenderness: There is abdominal tenderness. Musculoskeletal:         General: No swelling or deformity. Skin:     General: Skin is warm and dry. Neurological:      Mental Status: He is alert and oriented to person, place, and time. Mental status is at baseline.    Psychiatric:         Mood and Affect: Mood normal. Behavior: Behavior normal.           DATA:  Diagnostic tests reviewed by me for today's visit:    Recent Labs     10/19/20  1416 10/20/20  0442 10/21/20  0457   WBC 12.0* 11.7* 9.2   HCT 43.9 38.1* 34.5*    329 210     Iron Saturation:  No components found for: PERCENTFE  FERRITIN:  No results found for: FERRITIN  IRON:  No results found for: IRON  TIBC:  No results found for: TIBC    Recent Labs     10/20/20  0135 10/20/20  0442 10/20/20  1126 10/20/20  1736 10/21/20  0457   * 127* 126* 126* 124*   K 6.2* 5.8* 5.7* 5.6* 5.0   CL 99 99 95* 94* 94*   CO2 17* 17* 18* 18* 16*   BUN 79* 79* 76* 76* 77*   CREATININE 3.5* 3.5* 3.4* 3.4* 3.5*     Recent Labs     10/19/20  1416 10/20/20  0135 10/20/20  0442 10/20/20  1126 10/20/20  1736 10/21/20  0457   CALCIUM 9.3  9.0 8.1* 7.7* 8.0* 7.9* 7.7*   MG  --   --   --   --   --  2.50*   PHOS 5.6*  --   --   --   --  4.8     No results for input(s): PH, PCO2, PO2 in the last 72 hours.     Invalid input(s): Yesenia Leyden    ABG:  No results found for: PH, PCO2, PO2, HCO3, BE, THGB, TCO2, O2SAT  VBG:    Lab Results   Component Value Date    PHVEN 7.255 10/19/2020    OYW6CHG 33.7 10/19/2020    BEVEN -11.2 10/19/2020    L7GDSINV 81 10/19/2020       LDH:    Lab Results   Component Value Date     07/17/2017     Uric Acid:    Lab Results   Component Value Date    LABURIC 10.2 10/19/2020       PT/INR:    Lab Results   Component Value Date    PROTIME 12.2 10/20/2020    INR 1.05 10/20/2020     Warfarin PT/INR:  No components found for: PTPATWAR, PTINRWAR  PTT:    Lab Results   Component Value Date    APTT 30.0 06/25/2020   [APTT}  Last 3 Troponin:    Lab Results   Component Value Date    TROPONINI <0.01 10/20/2020    TROPONINI <0.01 10/19/2020       U/A:    Lab Results   Component Value Date    COLORU DARK YELLOW 10/19/2020    PROTEINU 30 10/19/2020    PHUR 5.0 10/19/2020    WBCUA 5 10/19/2020    RBCUA 3-4 10/19/2020    CLARITYU CLOUDY 10/19/2020    SPECGRAV 1.018 10/19/2020    LEUKOCYTESUR SMALL 10/19/2020    UROBILINOGEN 1.0 10/19/2020    BILIRUBINUR SMALL 10/19/2020    BLOODU Negative 10/19/2020    GLUCOSEU Negative 10/19/2020     Microalbumen/Creatinine ratio:  No components found for: RUCREAT  24 Hour Urine for Protein:  No components found for: RAWUPRO, UHRS3, PHVT01PB, UTV3  24 Hour Urine for Creatinine Clearance:  No components found for: CREAT4, UHRS10, UTV10  Urine Toxicology:  No components found for: IAMMENTA, IBARBIT, IBENZO, ICOCAINE, IMARTHC, IOPIATES, IPHENCYC    HgBA1c:  No results found for: LABA1C  RPR:  No results found for: RPR  HIV:  No results found for: HIV  ROBERT:  No results found for: ANATITER, ROBERT  RF:  No results found for: RF  DSDNA:  No components found for: DNA  AMYLASE:  No results found for: AMYLASE  LIPASE:    Lab Results   Component Value Date    LIPASE 42.0 10/19/2020     Fibrinogen Level:  No components found for: FIB       BELOW MENTIONED RADIOLOGY STUDY RESULTS BY ME:    Ct Abdomen Pelvis Wo Contrast    Result Date: 10/12/2020  Site: Dominic Garibay #: [id number] #: [id number]: Sruthi Smith #: [de-identified] #: [id number] #: [id number]: CT ABDOMEN PELVIS WO CONTRASTExam Date/Time: 10/12/2020 09:00 AMAdmitting Diagnosis: Transcribed OrderReason for Exam: Transcribed Order Dictated by: Sophie Rob RUSLAN: 10/12/2020 11:24 AMT: This document is confidential medical information. Unauthorized disclosure or use of this information is prohibited by law. If you are not the intended recipient of this document, please advise us by calling immediately [phone].  Impression/Conclusion below HISTORY: Transcribed Order, Secondary malignant neoplasm of left lung ([address] Melanoma of left upper arm ([address] Secondary malignant neoplasm of left lung; Malignant melanoma of left upper limb, including shoulder COMPARISON:  September 17, 2020 and June 11, 2020 TECHNIQUE: Noncontrast multiplanar CT images of the abdomen and pelvis. Images were obtained using automated exposure control. No IV contrast was administered oral Omnipaque, 100 mL was administered NOTE:  If there are questions about the content of this report, please contact [address] [place] [address] radiology by calling [phone] FINDINGS: LOWER CHEST:  Small effusion mild peripheral basilar airspace disease as described on chest CT. Coronary artery calcification LIVER:  Solid organ evaluation is limited by the lack of IV contrast. No focal lesion is suggested GALLBLADDER/BILE DUCTS:  Present PANCREAS:  Grossly unremarkable SPLEEN:  Benign calcifications ADRENALS:  Poorly distinguished there may be a right adrenal nodule. KIDNEYS/URETERS:  Known renal cysts. Nonobstructing intrarenal calculi. No change in renal contour or development of hydronephrosis GI TRACT:  Distention of proximal small bowel. Transition is likely in the left mid abdomen. Left colonic diverticulosis. VESSELS:  Patency cannot be determined without IV contrast LYMPH NODES: Extensive periaortic, pericaval, and mesenteric adenopathy. Size and extent adenopathy has significantly increased since June. It is more difficult to determine if there is a change in the size of the nodes when compared with September. ABD WALL:  Unremarkable PELVIS:  Unremarkable BONES:  Unremarkable OTHER:  Marked increase in ascitic fluid throughout the abdomen. Increased edema and thickening  of the omentum that may represent worsening carcinomatosis versus edema from inflow blockage. IMPRESSION: Development of massive ascites and extensive changes consistent with carcinomatosis. Extensive retroperitoneal and mesenteric adenopathy remains. It is difficult to determine if this has worsened since September. It is definitely worse when compared with June. . Distention of the proximal small bowel and decompression of the distal small bowel. Transition is likely in the left abdomen. There is gas in the colon.  This may represent a partial small bowel obstruction. Report was marked significant in hospital information system for physician notification SIGNED BY: Chaya Mcekon MD on 10/12/2020 11:21 AM    [facility] [phone] - 2011 [facility] [place]: [phone]        Ct Chest Wo Contrast    Result Date: 10/12/2020  Site: Gera Garcia #: [id number] #: [id number]: Jaren Francis #: [de-identified] #: [id number] #: [id number]: CT CHEST WO CONTRASTExam Date/Time: 10/12/2020 09:00 AMAdmitting Diagnosis: Transcribed OrderReason for Exam: Transcribed Order Dictated by: Zafar Duffy RUSLAN: 10/12/2020 12:55 PMT: This document is confidential medical information. Unauthorized disclosure or use of this information is prohibited by law. If you are not the intended recipient of this document, please advise us by calling immediately [phone]. Impression/Conclusion below HISTORY: Transcribed Order, Secondary malignant neoplasm of left lung ([address] Melanoma of left upper arm ([address] Secondary malignant neoplasm of left lung; Malignant melanoma of left upper limb, including shoulder COMPARISON: September 17 and June 11 TECHNIQUE: Noncontrast multiplanar CT images of the chest. Images were obtained using automated  exposure control. NOTE:  If there are questions about the content of this report, please contact [facility] radiology by calling [phone] FINDINGS: LUNGS/AIRWAYS:  Coarse linear stranding posteriorly in the right lower lobe with increased reticulation in the basilar segments of the right lower lobe. Patchy peripheral airspace disease in the right costophrenic angle has increased in size slightly. PLEURA: New right small effusion. MEDIASTINUM/SUZANNE:  Lobulation adjacent to the origin of the brachiocephalic arteries most consistent with adenopathy in this noncontrast exam. The appearance is stable from September.  Enlarged periaortic nodes along the descending aorta again evident and are not significantly changed since September. No change in hilar contour since September. There are calcified nodes. HEART/PERICARDIUM:  Coronary artery calcification. VESSELS:  Unremarkable. No aneurysm CHEST WALL/LOWER NECK:  Unremarkable UPPER ABDOMEN:  Huge increase in amount of abdominal ascites with thickening of the omentum suggestive of carcinomatosis. Upper abdominal adenopathy. Nonobstructing renal calculi. Findings and described in more detail on corresponding abdominal BONES:  No suspicious osteolytic lesion OTHER:  None  IMPRESSION: Stable adenopathy adjacent to the right brachiocephalic vessels and along the descending aorta since September. New small right effusion. Right basilar reticulation and coarse stranding persists. There is increased patchy peripheral airspace disease in the right costophrenic angle. Development of large amount of ascitic fluid with changes suggestive of carcinomatosis in the abdomen. This is better described on corresponding abdominal CT SIGNED BY: Shalonda Pink MD on 10/12/2020 12:52 PM    [facility] [phone] -  [address] [facility] [address]: [phone]         Xr Chest Portable    Result Date: 10/19/2020  EXAMINATION: ONE XRAY VIEW OF THE CHEST 10/19/2020 2:37 pm COMPARISON: Chest CT September 17, 2020. HISTORY: ORDERING SYSTEM PROVIDED HISTORY: SOB, hypotension, hx lung cancer TECHNOLOGIST PROVIDED HISTORY: Reason for exam:->SOB, hypotension, hx lung cancer Reason for Exam: Shortness of Breath (pt states he has lung CA, increase SOB, worsening today. pt was suppose to be a direct admit but no beds were available. pt currently on PO chemo. pt having abd distention for several days) Acuity: Chronic Type of Exam: Ongoing FINDINGS: There is mild elevation of the right hemidiaphragm. There is questionable small right-sided pleural effusion. There is mild right basilar airspace disease. Calcified granulomas again seen in the left lower lung.   No

## 2020-10-21 NOTE — PROGRESS NOTES
[facility]   Progress Note      10/21/2020 8:12 AM        Name: Ab Zimmerman . Admitted: 10/19/2020    SUBJECTIVE:  He remains weak, ascites has re-accumulated. He remains SOB. Talked to wife about that inpatient hospice might be a good option. Reviewed interval ancillary notes    Current Medications  ondansetron (ZOFRAN) injection 4 mg, Q6H    Or  promethazine (PHENERGAN) tablet 12.5 mg, Q6H  meropenem (MERREM) 500 mg IVPB (mini-bag), Q12H  albumin human 25 % IV solution 25 g, Q6H  morphine (PF) injection 1 mg, Once  sodium bicarbonate 50 mEq in dextrose 5 % 1,000 mL infusion, Continuous  hydrocortisone sodium succinate PF (SOLU-CORTEF) 100 mg in dextrose 5 % 50 mL IVPB, Q8H  sodium chloride flush 0.9 % injection 10 mL, 2 times per day  sodium chloride flush 0.9 % injection 10 mL, PRN  acetaminophen (TYLENOL) tablet 650 mg, Q6H PRN    Or  acetaminophen (TYLENOL) suppository 650 mg, Q6H PRN  polyethylene glycol (GLYCOLAX) packet 17 g, Daily PRN  heparin (porcine) injection 5,000 Units, 3 times per day  vancomycin (VANCOCIN) intermittent dosing (placeholder), RX Placeholder        Objective:  BP 81/69   Pulse 75   Temp 97.9 °F (36.6 °C) (Temporal)   Resp 17   Ht 5' 8\" (1.727 m)   Wt 135 lb 12.9 oz (61.6 kg)   SpO2 98%   BMI 20.65 kg/m²     Intake/Output Summary (Last 24 hours) at 10/21/2020 0812  Last data filed at 10/21/2020 0617  Gross per 24 hour   Intake 5321 ml   Output 425 ml   Net 4896 ml      Wt Readings from Last 3 Encounters:   10/21/20 135 lb 12.9 oz (61.6 kg)   06/25/20 170 lb (77.1 kg)   06/23/20 174 lb (78.9 kg)       General appearance:  Appears comfortable  Eyes: Sclera clear. Pupils equal.  ENT: Moist oral mucosa. Trachea midline, no adenopathy. Cardiovascular: Regular rhythm, normal S1, S2. No murmur. No edema in lower extremities  Respiratory: Not using accessory muscles. Good inspiratory effort. Clear to auscultation bilaterally, no wheeze or crackles.    GI: Abdomen soft, no tenderness, not distended  Musculoskeletal: No cyanosis in digits, neck supple  Neurology: CN 2-12 grossly intact. No speech or motor deficits  Psych: Normal affect. Alert and oriented in time, place and person  Skin: Warm, dry, normal turgor    Labs and Tests:  CBC:   Recent Labs     10/19/20  1416 10/20/20  0442 10/21/20  0457   WBC 12.0* 11.7* 9.2   HGB 14.5 12.6* 11.5*    329 210     BMP:    Recent Labs     10/20/20  1126 10/20/20  1736 10/21/20  0457   * 126* 124*   K 5.7* 5.6* 5.0   CL 95* 94* 94*   CO2 18* 18* 16*   BUN 76* 76* 77*   CREATININE 3.4* 3.4* 3.5*   GLUCOSE 119* 125* 102*     Hepatic:   Recent Labs     10/19/20  1416 10/20/20  0442   AST 18 20   ALT 7* 6*   BILITOT 0.4 0.3   ALKPHOS 218* 173*       ASSESSMENT AND PLAN    Active Problems:    Dyspnea    Small bowel obstruction (HCC)  Resolved Problems:    * No resolved hospital problems. *      1. Metastatic progressive melanoma:     -Patient is hospice appropriate, wife not sure she can care for patient at home  -palliative care following, wife interested in [facility] inpatient unit.     2. Abdominal distention and ascites:     -Ultrasound-guided paracentesis 10/20/2020  - will order paracentesis for today  - he has albumin ordered      3. Pneumonia:     -Management per primary team with broad-spectrum antibiotics. -pulmonary following      Gabino Clark CNP  [facility]    Patient was seen and examined. Agree with above. Paracentesis today for comfort. He is getting albumin. Will do Zofran around the clock for nausea and belching.     Kailey Pierre MD

## 2020-10-22 NOTE — DISCHARGE SUMMARY
Patient: Yasir Lawler     Gender: male  : 1940   Age: 79 y.o. MRN: [id number]    Admitting Physician: Lucretia Llanes MD  Discharge Physician: Lucretia Llanes MD     Code Status: DNR-CC     Admit Date: 10/19/2020   Discharge Date:  10/22/2020     Disposition:  Discharge/Readmit    Discharge Diagnoses:  Dyspnea secondary to abdominal distention and suspected pneumonia gram-positive gram-negative  Abdominal distention secondary to ascites related to underlying metastatic melanoma  Sepsis about due to the above  Small bowel obstruction      Active Hospital Problems    Diagnosis Date Noted    Small bowel obstruction ([facility] [address] [K56.609]     Dyspnea [R06.00] 10/19/2020           Condition at Discharge:  Terminal    Hospital Course:   71-year-old admitted to the hospital complaining of shortness of breath abdominal pain and distention     Dyspnea secondary to abdominal distention and suspected pneumonia gram-positive gram-negative  Abdominal distention secondary to ascites related to underlying metastatic melanoma  Sepsis about due to the above  Small bowel obstruction     Had 2 rounds of paracentesis  After discussions with oncology family is being made comfort care and hospice  I spoke to the wife at length she just wants him to be comfortable  I have discontinued all medications as per the wife's and patient's wishes including antibiotics  We will only focus on comfort with as needed morphine and Ativan  His breathing is a conal at this point will not transfer to inpatient hospice will keep him comfortable over here under hospice          Discharge Medications:   Current Discharge Medication List      START taking these medications    Details   morphine 20 MG/5ML solution Take 0.63 mLs by mouth every 2 hours as needed for Pain for up to 3 days.   Qty: 1 Bottle, Refills: 0    Comments: Reduce doses taken as pain becomes manageable  Associated Diagnoses: Small bowel obstruction (HCC)      LORazepam 1 MG/0.5ML CONC Take 2 mg by mouth every 2 hours as needed (anxiety)  Qty: 1 Bottle, Refills: 0    Associated Diagnoses: Small bowel obstruction ([address]           Current Discharge Medication List        Current Discharge Medication List      CONTINUE these medications which have NOT CHANGED    Details   acetaminophen (TYLENOL) 500 MG tablet Take 500 mg by mouth every 6 hours as needed for Pain      Skin Protectants, Misc. (EUCERIN) cream Apply topically as needed for Dry Skin Apply topically as needed. triamcinolone (KENALOG) 0.1 % lotion Apply topically as needed Apply topically 3 times daily. Current Discharge Medication List      STOP taking these medications       aspirin 325 MG tablet Comments:   Reason for Stopping:         predniSONE (DELTASONE) 10 MG tablet Comments:   Reason for Stopping:         Nivolumab (OPDIVO IV) Comments:   Reason for Stopping:         Clobetasol Prop Oint-Coal Tar (CLOBETAPLUS OINTMENT EX) Comments:   Reason for Stopping:         Ca Carbonate-Mag Hydroxide (ANTACID EXTRA STRENGTH) 675-135 MG CHEW Comments:   Reason for Stopping:         Misc Natural Products (GLUCOSAMINE CHONDROITIN MSM) TABS Comments:   Reason for Stopping:         diphenhydrAMINE (BENADRYL) 25 MG capsule Comments:   Reason for Stopping:                 Labs:  For convenience and continuity at follow-up the following most recent labs are provided:    Lab Results   Component Value Date    WBC 9.2 10/21/2020    HGB 11.5 10/21/2020    HCT 34.5 10/21/2020    MCV 85.8 10/21/2020     10/21/2020     10/21/2020    K 5.0 10/21/2020    K 5.8 10/20/2020    CL 94 10/21/2020    CO2 16 10/21/2020    BUN 77 10/21/2020    CREATININE 3.5 10/21/2020    CALCIUM 7.7 10/21/2020    PHOS 4.8 10/21/2020    ALKPHOS 173 10/20/2020    ALT 6 10/20/2020    AST 20 10/20/2020    BILITOT 0.3 10/20/2020    BILIDIR <0.2 10/20/2020    LABALBU 3.2 10/21/2020    LDLCALC 129 04/20/2018    TRIG 94 04/20/2018     Lab Results   Component Value Date INR 1.05 10/20/2020    INR 1.03 10/19/2020    INR 1.07 06/25/2020       Signed:    Mitchell Grande MD   10/22/2020      Thank you Misha Schulte MD for the opportunity to be involved in this patient's care.  If you have any questions or concerns please feel free to contact me

## 2020-10-22 NOTE — PROGRESS NOTES
Hospitalist Progress Note      PCP: Marques Nazario MD    Date of Admission: 10/19/2020    Chief Complaint: Dyspnea fatigue abdominal distention    Hospital Course: Had paracentesis yesterday with 3.3 L  Abdomen again distended  Currently looks comfortable  Pretty drowsy not really responding much to command      Medications:  Reviewed      Exam:    BP (!) 58/31   Pulse 73   Temp 98 °F (36.7 °C) (Temporal)   Resp 23   Ht 5' 8\" (1.727 m)   Wt 137 lb 3.2 oz (62.2 kg)   SpO2 92%   BMI 20.86 kg/m²     General appearance: Comfortable drowsy  HEENT: Pupils equal, round, and reactive to light. Conjunctivae/corneas clear. Neck: Supple, with full range of motion. No jugular venous distention. Trachea midline. Respiratory: Decreased air entry at lung bases  Cardiovascular: Regular rate and rhythm with normal S1/S2 without MURMURS, rubs or gallops. Abdomen: Distended mild generalized tenderness musculoskeletal: No clubbing, cyanosis or EDEMA bilaterally. Full range of motion without deformity. Skin: Skin color, texture, turgor normal.  No rashes or lesions. Neurologic: Drowsy but looks comfortable        Labs:   Recent Labs     10/19/20  1416 10/20/20  0442 10/21/20  0457   WBC 12.0* 11.7* 9.2   HGB 14.5 12.6* 11.5*   HCT 43.9 38.1* 34.5*    329 210     Recent Labs     10/19/20  1416  10/20/20  1126 10/20/20  1736 10/21/20  0457   *  129*   < > 126* 126* 124*   K 6.7*  6.6*   < > 5.7* 5.6* 5.0   CL 98*  100   < > 95* 94* 94*   CO2 10*  12*   < > 18* 18* 16*   BUN 77*  75*   < > 76* 76* 77*   CREATININE 3.4*  3.3*   < > 3.4* 3.4* 3.5*   CALCIUM 9.3  9.0   < > 8.0* 7.9* 7.7*   PHOS 5.6*  --   --   --  4.8    < > = values in this interval not displayed.      Recent Labs     10/19/20  1416 10/20/20  0442   AST 18 20   ALT 7* 6*   BILIDIR <0.2 <0.2   BILITOT 0.4 0.3   ALKPHOS 218* 173*     Recent Labs     10/19/20  1416 10/20/20  0442   INR 1.03 1.05     Recent Labs     10/19/20  1903 10/20/20  0135   TROPONINI <0.01 <0.01       Urinalysis:      Lab Results   Component Value Date    NITRU Negative 10/19/2020    WBCUA 5 10/19/2020    RBCUA 3-4 10/19/2020    BLOODU Negative 10/19/2020    SPECGRAV 1.018 10/19/2020    GLUCOSEU Negative 10/19/2020       Radiology:  IR US GUIDED PARACENTESIS   Final Result   Successful ultrasound guided paracentesis. XR ABDOMEN (KUB) (SINGLE AP VIEW)   Final Result   Persistent distention of small bowel. Persistent ascites         IR US GUIDED PARACENTESIS   Final Result   Successful ultrasound guided paracentesis. CT ABDOMEN PELVIS WO CONTRAST Additional Contrast? None   Final Result   Dilated loops of small bowel throughout the anterior, central, and midline   abdomen suspicious for obstruction, though evaluation for a discrete   transition point is limited by the absence of contrast and the volume of   intraperitoneal ascites. Extensive metastatic disease with diffuse peritoneal nodularity presumed on   the basis of carcinomatosis, new from prior comparison evaluation 5 months   prior. .      Pathologic lymphadenopathy throughout the abdomen and pelvis, slightly   decreased in bulk in the retroperitoneum and increased throughout the   mesentery relative to examination from 5 months prior. The adenopathy within   the partially imaged lower chest is grossly stable. Large volume simple ascites, new from prior comparison. New small simple right pleural effusion. Asymmetric enlargement and soft tissue stranding within the right pectoralis   subcutaneous soft tissues. Correlate with physical exam for cellulitis or   other focal abnormality. XR CHEST PORTABLE   Final Result   Right basilar airspace disease, atelectasis versus pneumonia with   questionable small right-sided pleural effusion.              Assessment/Plan:    Active Hospital Problems    Diagnosis Date Noted    Small bowel obstruction (HCC) [K56.609]     Dyspnea [R06.00] 10/19/2020       Acute Medical Issues Being Addressed:    60-year-old admitted to the hospital complaining of shortness of breath abdominal pain and distention    Dyspnea secondary to abdominal distention and suspected pneumonia gram-positive gram-negative  Abdominal distention secondary to ascites related to underlying metastatic melanoma  Sepsis about due to the above  Small bowel obstruction    Had 2 rounds of paracentesis  After discussions with oncology family is being made comfort care and hospice  I spoke to the wife at length she just wants him to be comfortable  I have discontinued all medications as per the wife's and patient's wishes including antibiotics  We will only focus on comfort with as needed morphine and Ativan  His breathing is a conal at this point will not transfer to inpatient hospice will keep him comfortable over here under hospice      Dispo - once acute medical processes have resolved    Chloe Chapman MD

## 2020-10-22 NOTE — PROGRESS NOTES
Oncology [facility] Hematology Care   Progress Note    10/22/2020    SUBJECTIVE:  Patient unresponsive agonal breathing wife at bedside    OBJECTIVE:    Physical Assessment:  Vitals:  BP (!) 82/43   Pulse 75   Temp 98.1 °F (36.7 °C) (Temporal)   Resp 23   Ht 5' 8\" (1.727 m)   Wt 137 lb 3.2 oz (62.2 kg)   SpO2 94%   BMI 20.86 kg/m²    24HR INTAKE/OUTPUT:      Intake/Output Summary (Last 24 hours) at 10/22/2020 0814  Last data filed at 10/21/2020 1400  Gross per 24 hour   Intake 765 ml   Output 3400 ml   Net -2635 ml       CONSTITUTIONAL:  unresponsive   HEENT: PERRL, Neck: soft, supple, no cervical, supraclavicular or axillary adenopathy  RESPIRATORY:  No increased work of breathing, breath sounds decreased. CARDIOVASCULAR:  Cardiac S1/S2, RRR  GASTROINTESTINAL:  abdomen soft +BS x4, no hepatosplenomegaly distended with fluid   SKIN:  negative for rash and skin lesion(s)  MUSCULOSKELETAL:  negative for pain and muscle weakness  EXTREMITIES: Negative for Lower extremity edema    Labs Results:    CBC:   Recent Labs     10/19/20  1416 10/20/20  0442 10/21/20  0457   WBC 12.0* 11.7* 9.2   HGB 14.5 12.6* 11.5*   HCT 43.9 38.1* 34.5*   MCV 86.6 86.2 85.8    329 210     BMP:   Recent Labs     10/19/20  1416  10/20/20  1126 10/20/20  1736 10/21/20  0457   *  129*   < > 126* 126* 124*   K 6.7*  6.6*   < > 5.7* 5.6* 5.0   CL 98*  100   < > 95* 94* 94*   CO2 10*  12*   < > 18* 18* 16*   PHOS 5.6*  --   --   --  4.8   BUN 77*  75*   < > 76* 76* 77*   CREATININE 3.4*  3.3*   < > 3.4* 3.4* 3.5*    < > = values in this interval not displayed.      LIVER PROFILE:   Recent Labs     10/19/20  1416 10/20/20  0442   AST 18 20   ALT 7* 6*   LIPASE 42.0  --    BILIDIR <0.2 <0.2   BILITOT 0.4 0.3   ALKPHOS 218* 173*     PT/INR:    Lab Results   Component Value Date    PROTIME 12.2 10/20/2020    PROTIME 12.0 10/19/2020    PROTIME 12.4 06/25/2020    INR 1.05 10/20/2020    INR 1.03 10/19/2020    INR 1.07 06/25/2020 PTT:    Lab Results   Component Value Date    APTT 30.0 06/25/2020    APTT 32.0 06/08/2018    APTT 31.8 03/11/2016     UA:  Recent Labs     10/19/20  1848   COLORU DARK YELLOW*   PHUR 5.0   WBCUA 5   RBCUA 3-4   CLARITYU CLOUDY*   SPECGRAV 1.018   LEUKOCYTESUR SMALL*   UROBILINOGEN 1.0   BILIRUBINUR SMALL*   BLOODU Negative   GLUCOSEU Negative     Magnesium:   Lab Results   Component Value Date    MG 2.50 10/21/2020    MG 1.80 07/23/2019    MG 1.70 07/22/2019     ROBERT:  No results found for: ANATITER, ROBERT    RADIOLOGY:    Ct Abdomen Pelvis Wo Contrast Additional Contrast? None    Result Date: 10/19/2020  EXAMINATION: CT OF THE ABDOMEN AND PELVIS WITHOUT CONTRAST 10/19/2020 12:41 pm TECHNIQUE: CT of the abdomen and pelvis was performed without the administration of intravenous contrast. Multiplanar reformatted images are provided for review. Dose modulation, iterative reconstruction, and/or weight based adjustment of the mA/kV was utilized to reduce the radiation dose to as low as reasonably achievable. COMPARISON: Chest radiograph from earlier on today's date, CTs of the chest abdomen and pelvis from September 17, 2020 06/11/2020, and 03/20/2020 HISTORY: ORDERING SYSTEM PROVIDED HISTORY: abdominal pain, distention, metastatic disease TECHNOLOGIST PROVIDED HISTORY: Reason for exam:->abdominal pain, distention, metastatic disease Additional Contrast?->None Reason for Exam: abdominal pain, distention, metastatic disease Acuity: Unknown Type of Exam: Unknown FINDINGS: Lower Chest: Similar regions of ground-glass and reticulation throughout the partially imaged right lung with scattered calcified granulomata. Small simple layering right pleural effusion, new from prior. Persistent adenopathy most conspicuous in the Elizabeth off a geode region. Evaluation is limited by the absence of contrast. Organs: Evaluation limited by the absence of contrast.  Liver appears grossly homogeneous in attenuation.   Layering hyperdense likely sludge within the otherwise grossly unremarkable gallbladder. Calcified granulomata within the normal-sized spleen. Pancreatic parenchymal atrophy, unchanged. Adrenal glands unchanged relative to prior with some nonspecific nodularity on the right. Kidneys mildly atrophic with an exophytic cyst on the left and similar perinephric stranding without stones or hydronephrosis. GI/Bowel: Dilated loops of small bowel measuring up to 4 cm are present throughout the anterior and central abdomen, though evaluation for a discrete transition point is limited by the absence of contrast and the volume of intraperitoneal ascites. The colon is relatively decompressed containing enteric contrast.  Patulous appearance of the cecum. Diverticulosis predominating in the distal descending and sigmoid colon. Pelvis: Large volume simple ascites. Urinary bladder are, prostate, and seminal vesicles are grossly unremarkable. Pathologic pelvic sidewall adenopathy, worse on the left. Peritoneum/Retroperitoneum: Large volume of simple intraperitoneal ascites on a background of omental nodularity and retroperitoneal and mesenteric adenopathy. The bulk of the retroperitoneal adenopathy has decreased in caliber, but the mesenteric adenopathy has increased. Irregular appearing collection of air in the right lower quadrant, though this appears to be contained within the patulous cecum without convincing evidence for pneumoperitoneum. Bones/Soft Tissues: Diffuse osseous demineralization and degenerative changes. Diffuse subcutaneous edema. Asymmetric enlargement of the right breast relative to the left on a background of mild bilateral gynecomastia. Dilated loops of small bowel throughout the anterior, central, and midline abdomen suspicious for obstruction, though evaluation for a discrete transition point is limited by the absence of contrast and the volume of intraperitoneal ascites.  Extensive metastatic disease with diffuse peritoneal nodularity presumed on the basis of carcinomatosis, new from prior comparison evaluation 5 months prior. . Pathologic lymphadenopathy throughout the abdomen and pelvis, slightly decreased in bulk in the retroperitoneum and increased throughout the mesentery relative to examination from 5 months prior. The adenopathy within the partially imaged lower chest is grossly stable. Large volume simple ascites, new from prior comparison. New small simple right pleural effusion. Asymmetric enlargement and soft tissue stranding within the right pectoralis subcutaneous soft tissues. Correlate with physical exam for cellulitis or other focal abnormality. Ct Abdomen Pelvis Wo Contrast    Result Date: 10/12/2020  Site: Meg Yee #: [id number] #: [id number]: Yudelka Zepeda #: [de-identified] #: [id number] #: [id number]: CT ABDOMEN PELVIS WO CONTRASTExam Date/Time: 10/12/2020 09:00 AMAdmitting Diagnosis: Transcribed OrderReason for Exam: Transcribed Order Dictated by: Paulina Petersen RUSLAN: 10/12/2020 11:24 AMT: This document is confidential medical information. Unauthorized disclosure or use of this information is prohibited by law. If you are not the intended recipient of this document, please advise us by calling immediately [phone]. Impression/Conclusion below HISTORY: Transcribed Order, Secondary malignant neoplasm of left lung ([facility] [address] Melanoma of left upper arm ([facility] [address] Secondary malignant neoplasm of left lung; Malignant melanoma of left upper limb, including shoulder COMPARISON:  September 17, 2020 and June 11, 2020 TECHNIQUE: Noncontrast multiplanar CT images of the abdomen and pelvis. Images were obtained using automated exposure control.  No IV contrast was administered oral Omnipaque, 100 mL was administered NOTE:  If there are questions about the content of this report, please contact [facility] radiology by calling [phone] FINDINGS: LOWER CHEST:  Small effusion mild peripheral basilar airspace disease as described on chest CT. Coronary artery calcification LIVER:  Solid organ evaluation is limited by the lack of IV contrast. No focal lesion is suggested GALLBLADDER/BILE DUCTS:  Present PANCREAS:  Grossly unremarkable SPLEEN:  Benign calcifications ADRENALS:  Poorly distinguished there may be a right adrenal nodule. KIDNEYS/URETERS:  Known renal cysts. Nonobstructing intrarenal calculi. No change in renal contour or development of hydronephrosis GI TRACT:  Distention of proximal small bowel. Transition is likely in the left mid abdomen. Left colonic diverticulosis. VESSELS:  Patency cannot be determined without IV contrast LYMPH NODES: Extensive periaortic, pericaval, and mesenteric adenopathy. Size and extent adenopathy has significantly increased since June. It is more difficult to determine if there is a change in the size of the nodes when compared with September. ABD WALL:  Unremarkable PELVIS:  Unremarkable BONES:  Unremarkable OTHER:  Marked increase in ascitic fluid throughout the abdomen. Increased edema and thickening  of the omentum that may represent worsening carcinomatosis versus edema from inflow blockage. IMPRESSION: Development of massive ascites and extensive changes consistent with carcinomatosis. Extensive retroperitoneal and mesenteric adenopathy remains. It is difficult to determine if this has worsened since September. It is definitely worse when compared with June. . Distention of the proximal small bowel and decompression of the distal small bowel. Transition is likely in the left abdomen. There is gas in the colon. This may represent a partial small bowel obstruction.  Report was marked significant in hospital information system for physician notification SIGNED BY: Ernesto Logan MD on 10/12/2020 11:21  [facility] [phone] -  [address] [facility]: [phone]     Xr Abdomen (kub) (single Ap View)    Result Date: 10/20/2020  EXAMINATION: ONE SUPINE XRAY VIEW(S) OF THE ABDOMEN 10/20/2020 2:02 pm COMPARISON: October 19, 2020 HISTORY: ORDERING SYSTEM PROVIDED HISTORY: abdominal distention TECHNOLOGIST PROVIDED HISTORY: Reason for exam:->abdominal distention Reason for Exam: Abdominal distention Acuity: Unknown Type of Exam: Unknown FINDINGS: There is persistence distension the small bowel. There is loss properitoneal fat lines abdominal ascites. Free air cannot be excluded on supine film. Persistent distention of small bowel. Persistent ascites     Ct Chest Wo Contrast    Result Date: 10/12/2020  Site: Nitin Ma #: [id number] #: [id number]: Yolanda Ephraim #: [de-identified] #: [id number] #: [id number]: CT CHEST WO CONTRASTExam Date/Time: 10/12/2020 09:00 AMAdmitting Diagnosis: Transcribed OrderReason for Exam: Transcribed Order Dictated by: Seun Villareal RUSLAN: 10/12/2020 12:55 PMT: This document is confidential medical information. Unauthorized disclosure or use of this information is prohibited by law. If you are not the intended recipient of this document, please advise us by calling immediately [phone]. Impression/Conclusion below HISTORY: Transcribed Order, Secondary malignant neoplasm of left lung ([address] Melanoma of left upper arm ([facility] [address] Secondary malignant neoplasm of left lung; Malignant melanoma of left upper limb, including shoulder COMPARISON: September 17 and June 11 TECHNIQUE: Noncontrast multiplanar CT images of the chest. Images were obtained using automated  exposure control. NOTE:  If there are questions about the content of this report, please contact [address] [place] [address] radiology by calling [phone] FINDINGS: LUNGS/AIRWAYS:  Coarse linear stranding posteriorly in the right lower lobe with increased reticulation in the basilar segments of the right lower lobe. Patchy peripheral airspace disease in the right costophrenic angle has increased in size slightly.  PLEURA: New right small effusion. MEDIASTINUM/SUZANNE:  Lobulation adjacent to the origin of the brachiocephalic arteries most consistent with adenopathy in this noncontrast exam. The appearance is stable from September. Enlarged periaortic nodes along the descending aorta again evident and are not significantly changed since September. No change in hilar contour since September. There are calcified nodes. HEART/PERICARDIUM:  Coronary artery calcification. VESSELS:  Unremarkable. No aneurysm CHEST WALL/LOWER NECK:  Unremarkable UPPER ABDOMEN:  Huge increase in amount of abdominal ascites with thickening of the omentum suggestive of carcinomatosis. Upper abdominal adenopathy. Nonobstructing renal calculi. Findings and described in more detail on corresponding abdominal BONES:  No suspicious osteolytic lesion OTHER:  None  IMPRESSION: Stable adenopathy adjacent to the right brachiocephalic vessels and along the descending aorta since September. New small right effusion. Right basilar reticulation and coarse stranding persists. There is increased patchy peripheral airspace disease in the right costophrenic angle. Development of large amount of ascitic fluid with changes suggestive of carcinomatosis in the abdomen. This is better described on corresponding abdominal CT SIGNED BY: Rica Castaneda MD on 10/12/2020 12:52 PM  [facility] [phone] -  [address] [facility] [address]: [phone]      Xr Chest Portable    Result Date: 10/19/2020  EXAMINATION: ONE XRAY VIEW OF THE CHEST 10/19/2020 2:37 pm COMPARISON: Chest CT September 17, 2020. HISTORY: ORDERING SYSTEM PROVIDED HISTORY: SOB, hypotension, hx lung cancer TECHNOLOGIST PROVIDED HISTORY: Reason for exam:->SOB, hypotension, hx lung cancer Reason for Exam: Shortness of Breath (pt states he has lung CA, increase SOB, worsening today. pt was suppose to be a direct admit but no beds were available. pt currently on PO chemo.  pt having abd distention for several days) Acuity: Chronic Type of Exam: Ongoing FINDINGS: There is mild elevation of the right hemidiaphragm. There is questionable small right-sided pleural effusion. There is mild right basilar airspace disease. Calcified granulomas again seen in the left lower lung. No pneumothorax. Cardiac and mediastinal contours are without acute process. No acute osseous abnormality. Right basilar airspace disease, atelectasis versus pneumonia with questionable small right-sided pleural effusion. Ir Us Guided Paracentesis    Result Date: 10/21/2020  PROCEDURE: ULTRASOUND GUIDED PARACENTESIS 10/21/2020 HISTORY: ORDERING SYSTEM PROVIDED HISTORY: ascites TECHNOLOGIST PROVIDED HISTORY: Reason for exam:->ascites TECHNIQUE: Informed consent was obtained after a detailed explanation of the procedure including risks, benefits, and alternatives. Universal protocol was followed. The right abdomen was prepped and draped in sterile fashion and local anesthesia was achieved with lidocaine. A 5 Croatian needle sheath was advanced under ultrasound guidance into ascites and paracentesis was performed. The patient tolerated the procedure well. FINDINGS: A total of 3.3 L clear yellow fluid was removed. Successful ultrasound guided paracentesis. Ir Us Guided Paracentesis    Result Date: 10/20/2020  PROCEDURE: ULTRASOUND GUIDED PARACENTESIS 10/20/2020 HISTORY: ORDERING SYSTEM PROVIDED HISTORY: ascites TECHNOLOGIST PROVIDED HISTORY: Reason for exam:->ascites Reason for exam:->Therapeutic paracentesis TECHNIQUE: Informed consent was obtained after a detailed explanation of the procedure including risks, benefits, and alternatives. Universal protocol was followed. The right abdomen was prepped and draped in sterile fashion and local anesthesia was achieved with lidocaine. A 5 Croatian needle sheath was advanced under ultrasound guidance into ascites and paracentesis was performed.   The patient tolerated the procedure well. Estimated blood loss: Less than 5 cc FINDINGS: A total of 7.1 L was removed. Successful ultrasound guided paracentesis. Current Medications   ondansetron  4 mg Intravenous Q6H    Or    promethazine  12.5 mg Oral Q6H    meropenem  500 mg Intravenous Q12H    albumin human  25 g Intravenous Q6H    hydrocortisone (SOLU-CORTEF) IVPB  100 mg Intravenous Q8H    sodium chloride flush  10 mL Intravenous 2 times per day    heparin (porcine)  5,000 Units Subcutaneous 3 times per day    vancomycin (VANCOCIN) intermittent dosing (placeholder)   Other RX Placeholder        ASSESSMENT & PLAN:  Metastatic melanoma patient has progressed on multiple treatments including braf inhibitors immunotherapy and temozolomide He appears agonal Hospice consult pending Would prefer to partner with hospice here and not transfer patient when he is so unstable        I have discussed the above stated plan with the patient and they verbalized understanding and agreed with the plan. Thank you for allowing us to participate in this patients care.     Abhi García MD, 10/22/2020, 8:14 AM

## 2020-10-22 NOTE — SIGNIFICANT EVENT
[facility]  Death/Discharge Summary    Sobeida Mendez       :  1940              MRN:  [id number]    Admit date:  10/19/2020    Discharge date: 10/19/2020    Admitting Physician:  Katt Doan MD  Discharge Physician: Avril Sapp        Admission Condition:  serious    Discharged Condition:      History of Present Illness: Metastatic melanoma patient has progressed on multiple treatments including braf inhibitors immunotherapy and temozolomide He appears agonal Hospice consult pending Would prefer to partner with hospice here and not transfer patient when he is so unstable.       Discharge Physical Exam:    Called by nurse to pronounce patient. Patient seen and examined. No palpable pulse. Pupils fixed and dilated. No cardiac or pulmonary activity. Patient pronounced dead.      Time of Death: 16:10    Cause of Death: CANCER , Metastatic melanoma       Disposition:   [facility]    Time spent on Discharged less than 30 minutes      Signed:  aKthy Singh MD  10/22/2020, 4:13 PM

## 2020-10-22 NOTE — PROGRESS NOTES
MD Kimber Coyle MD Veneda Napoleon, MD               Office: [phone]                      Fax: [phone]             [facility]                   NEPHROLOGY ICU PROGRESS NOTE:     PATIENT NAME: Doni Fletcher  : 1940  MRN: [id number]       RECOMMENDATIONS:     - not checking labs , as plans for hospice     Will sign off   Thank you for allowing me to participate in this patient's care. Please do not hesitate to contact me for any questions/concerns. - stopped bicarb infusion  - kept albumin PRN after 2 LVP   - K better    - no K-binder use, small bowel obstruction   - s/p I/D + bicarb  - keep bonds for comfort   - pt's wife has said no for dialysis,   - poor candidate for long term dialysis w/ poor oncology prognosis. IMPRESSION:       ATUL (on CKD-3A): Oliguric, severe - plateau now   - BL Scr- 0.6 ---> 3.3 on admission  : Etiology of ATUL - pre-renal / ATN : w/ hypotension 70s/50s    - other differentials: r/o obstruction   - Renal imaging: CT on 10/12: Development of massive ascites and extensive changes consistent with carcinomatosis. : Hypovolemic (per urine lytes) -> ischemic ATN - remains plateau   + UOP ~ 115 cc still     Associated problems:   - Azotemia: severe in to 70s - pre-renal - improving, but not resolving yet   - Electrolytes: K: 6.6 - hyperkalemia  - better   - Volume status: hypo-volemic : not resolving   : Na: hyponatremia - moderate   : BP: Hypotension 70/50s in ER - better   - Acid-Base: severe, 12, non-AG - metabolic, LA 3.5 on admission -     Other problems: Management per primary and other consulting teams. # H/O MM, ascites, carcinomatosis     # SBO - on conservative mx     Hospital Problems           Last Modified POA    Dyspnea 10/19/2020 Yes    Small bowel obstruction ([address] 10/20/2020 Yes          Please refer to the orders. High Complexity. Multiple complex problems.   Discussed with patient;s wife , hospitalist - Dr Ricki Castaneda   Thank you for allowing me to participate in this patient's care. Please do not hesitate to contact me with any questions/concerns. We will follow along with you. Crystal Pereira MD       [facility] [address] [place] [address] [place]: [phone] or Via [email or web address]  Fax: [phone]        ========================================================   ========================================================   Subjective:   Pt agonally breathing   No alert   Wife at bedside  Past medical, Surgical, Social, Family medical history reviewed by me. MEDICATIONS: reviewed by me. Medications Prior to Admission:    Current Facility-Administered Medications:     morphine injection 4 mg, 4 mg, Intravenous, Q2H PRN, Dorita Veliz MD    LORazepam (ATIVAN) injection 2 mg, 2 mg, Intravenous, Q2H PRN, Dorita Veliz MD    scopolamine (TRANSDERM-SCOP) transdermal patch 1 patch, 1 patch, Transdermal, Q72H, Dorita Veliz MD    promethazine (PHENERGAN) tablet 12.5 mg, 12.5 mg, Oral, Q6H, 12.5 mg at 10/21/20 1546 **OR** ondansetron (ZOFRAN) injection 4 mg, 4 mg, Intravenous, Q6H, David Franco MD, 4 mg at 10/21/20 2147    promethazine (PHENERGAN) injection 12.5 mg, 12.5 mg, Intravenous, Q6H PRN, Hayden Chew MD, 12.5 mg at 10/22/20 0239    sodium chloride flush 0.9 % injection 10 mL, 10 mL, Intravenous, 2 times per day, Dorita Veliz MD, 10 mL at 10/21/20 0921    sodium chloride flush 0.9 % injection 10 mL, 10 mL, Intravenous, PRN, Dorita Veliz MD    acetaminophen (TYLENOL) tablet 650 mg, 650 mg, Oral, Q6H PRN, 650 mg at 10/20/20 2049 **OR** acetaminophen (TYLENOL) suppository 650 mg, 650 mg, Rectal, Q6H PRN, Dorita Veliz MD    polyethylene glycol (GLYCOLAX) packet 17 g, 17 g, Oral, Daily PRN, Dorita Veliz MD    Medications Prior to Admission: acetaminophen (TYLENOL) 500 MG tablet, Take 500 mg by mouth every 6 hours as needed for Pain  Skin Protectants, Misc.  (EUCERIN) cream, Apply topically as needed for Dry Skin Apply topically as needed. triamcinolone (KENALOG) 0.1 % lotion, Apply topically as needed Apply topically 3 times daily. [DISCONTINUED] aspirin 325 MG tablet, Take 325 mg by mouth daily  [DISCONTINUED] predniSONE (DELTASONE) 10 MG tablet, Take 10 mg by mouth daily  [DISCONTINUED] Nivolumab (OPDIVO IV), Infuse intravenously every 21 days Indications: plus Yervoy  [DISCONTINUED] Clobetasol Prop Oint-Coal Tar (CLOBETAPLUS OINTMENT EX), Apply topically as needed  [DISCONTINUED] Ca Carbonate-Mag Hydroxide (ANTACID EXTRA STRENGTH) 675-135 MG CHEW, Take by mouth as needed  [DISCONTINUED] Misc Natural Products (GLUCOSAMINE CHONDROITIN MSM) TABS, Take by mouth daily as needed   [DISCONTINUED] diphenhydrAMINE (BENADRYL) 25 MG capsule, Take 25 mg by mouth as needed for Itching     Scheduled Meds:   scopolamine  1 patch Transdermal Q72H    ondansetron  4 mg Intravenous Q6H    Or    promethazine  12.5 mg Oral Q6H    sodium chloride flush  10 mL Intravenous 2 times per day     Continuous Infusions:    PRN Meds:. REVIEW OF SYSTEMS:  As mentioned in chief complaint and HPI , Subjective    unable to obtain     PHYSICAL EXAM:  Recent vital signs and recent I/Os reviewed by me.      Wt Readings from Last 3 Encounters:   10/22/20 137 lb 3.2 oz (62.2 kg)   06/25/20 170 lb (77.1 kg)   06/23/20 174 lb (78.9 kg)     BP Readings from Last 3 Encounters:   10/22/20 (!) 58/31   06/25/20 125/71   06/23/20 120/80     Patient Vitals for the past 24 hrs:   BP Temp Temp src Pulse Resp SpO2 Weight   10/22/20 0800 (!) 58/31 98 °F (36.7 °C) Temporal -- 23 92 % --   10/22/20 0600 -- -- -- -- -- -- 137 lb 3.2 oz (62.2 kg)   10/21/20 2200 (!) 82/43 98.1 °F (36.7 °C) Temporal 75 23 94 % --   10/21/20 1600 (!) 81/39 97.8 °F (36.6 °C) Temporal 78 15 95 % --   10/21/20 1400 (!) 84/43 -- -- 73 16 -- --   10/21/20 1200 (!) 88/49 98.1 °F (36.7 °C) Temporal 73 14 95 % --   10/21/20 1000 (!) 75/42 -- -- 69 16 -- -- 7.7*   MG  --   --   --   --   --  2.50*   PHOS 5.6*  --   --   --   --  4.8     No results for input(s): PH, PCO2, PO2 in the last 72 hours.     Invalid input(s): Paramjit Cough    ABG:  No results found for: PH, PCO2, PO2, HCO3, BE, THGB, TCO2, O2SAT  VBG:    Lab Results   Component Value Date    PHVEN 7.255 10/19/2020    WWA1YCJ 33.7 10/19/2020    BEVEN -11.2 10/19/2020    X9TMVSHU 81 10/19/2020       LDH:    Lab Results   Component Value Date     07/17/2017     Uric Acid:    Lab Results   Component Value Date    LABURIC 10.2 10/19/2020       PT/INR:    Lab Results   Component Value Date    PROTIME 12.2 10/20/2020    INR 1.05 10/20/2020     Warfarin PT/INR:  No components found for: Mahdu Better  PTT:    Lab Results   Component Value Date    APTT 30.0 06/25/2020   [APTT}  Last 3 Troponin:    Lab Results   Component Value Date    TROPONINI <0.01 10/20/2020    TROPONINI <0.01 10/19/2020       U/A:    Lab Results   Component Value Date    COLORU DARK YELLOW 10/19/2020    PROTEINU 30 10/19/2020    PHUR 5.0 10/19/2020    WBCUA 5 10/19/2020    RBCUA 3-4 10/19/2020    CLARITYU CLOUDY 10/19/2020    SPECGRAV 1.018 10/19/2020    LEUKOCYTESUR SMALL 10/19/2020    UROBILINOGEN 1.0 10/19/2020    BILIRUBINUR SMALL 10/19/2020    BLOODU Negative 10/19/2020    GLUCOSEU Negative 10/19/2020     Microalbumen/Creatinine ratio:  No components found for: RUCREAT  24 Hour Urine for Protein:  No components found for: RAWUPRO, UHRS3, UXVY48RX, UTV3  24 Hour Urine for Creatinine Clearance:  No components found for: CREAT4, UHRS10, UTV10  Urine Toxicology:  No components found for: IAMMENTA, IBARBIT, IBENZO, ICOCAINE, IMARTHC, IOPIATES, IPHENCYC    HgBA1c:  No results found for: LABA1C  RPR:  No results found for: RPR  HIV:  No results found for: HIV  ROBERT:  No results found for: ANATITER, ROBERT  RF:  No results found for: RF  DSDNA:  No components found for: DNA  AMYLASE:  No results found for: AMYLASE  LIPASE:    Lab Results   Component Value Date    LIPASE 42.0 10/19/2020     Fibrinogen Level:  No components found for: FIB       BELOW MENTIONED RADIOLOGY STUDY RESULTS BY ME:    Ct Abdomen Pelvis Wo Contrast    Result Date: 10/12/2020  Site: [facility] #: [id number] #: [id number]: Clyde Dove #: [de-identified] #: [id number] #: [id number]: CT ABDOMEN PELVIS WO CONTRASTExam Date/Time: 10/12/2020 09:00 AMAdmitting Diagnosis: Transcribed OrderReason for Exam: Transcribed Order Dictated by: Ollie Ortiz RUSLAN: 10/12/2020 11:24 AMT: This document is confidential medical information. Unauthorized disclosure or use of this information is prohibited by law. If you are not the intended recipient of this document, please advise us by calling immediately [phone]. Impression/Conclusion below HISTORY: Transcribed Order, Secondary malignant neoplasm of left lung ([address] Melanoma of left upper arm ([address] Secondary malignant neoplasm of left lung; Malignant melanoma of left upper limb, including shoulder COMPARISON:  September 17, 2020 and June 11, 2020 TECHNIQUE: Noncontrast multiplanar CT images of the abdomen and pelvis. Images were obtained using automated exposure control. No IV contrast was administered oral Omnipaque, 100 mL was administered NOTE:  If there are questions about the content of this report, please contact [address] [place] [address] radiology by calling [phone] FINDINGS: LOWER CHEST:  Small effusion mild peripheral basilar airspace disease as described on chest CT. Coronary artery calcification LIVER:  Solid organ evaluation is limited by the lack of IV contrast. No focal lesion is suggested GALLBLADDER/BILE DUCTS:  Present PANCREAS:  Grossly unremarkable SPLEEN:  Benign calcifications ADRENALS:  Poorly distinguished there may be a right adrenal nodule. KIDNEYS/URETERS:  Known renal cysts. Nonobstructing intrarenal calculi.  No change in renal contour or development of hydronephrosis GI TRACT: Distention of proximal small bowel. Transition is likely in the left mid abdomen. Left colonic diverticulosis. VESSELS:  Patency cannot be determined without IV contrast LYMPH NODES: Extensive periaortic, pericaval, and mesenteric adenopathy. Size and extent adenopathy has significantly increased since June. It is more difficult to determine if there is a change in the size of the nodes when compared with September. ABD WALL:  Unremarkable PELVIS:  Unremarkable BONES:  Unremarkable OTHER:  Marked increase in ascitic fluid throughout the abdomen. Increased edema and thickening  of the omentum that may represent worsening carcinomatosis versus edema from inflow blockage. IMPRESSION: Development of massive ascites and extensive changes consistent with carcinomatosis. Extensive retroperitoneal and mesenteric adenopathy remains. It is difficult to determine if this has worsened since September. It is definitely worse when compared with June. . Distention of the proximal small bowel and decompression of the distal small bowel. Transition is likely in the left abdomen. There is gas in the colon. This may represent a partial small bowel obstruction. Report was marked significant in hospital information system for physician notification SIGNED BY: Joe Wiley MD on 10/12/2020 11:21 AM    [facility] [phone] -  [address]: [phone]        Ct Chest Wo Contrast    Result Date: 10/12/2020  Site: [facility] #: [id number] #: [id number]: Clyde Dove #: [de-identified] #: [id number] #: [id number]: CT CHEST WO CONTRASTExam Date/Time: 10/12/2020 09:00 AMAdmitting Diagnosis: Transcribed OrderReason for Exam: Transcribed Order Dictated by: Ollie Ortiz RUSLAN: 10/12/2020 12:55 PMT: This document is confidential medical information. Unauthorized disclosure or use of this information is prohibited by law. If you are not the intended recipient of this document, please advise us by calling immediately [phone]. Impression/Conclusion below HISTORY: Transcribed Order, Secondary malignant neoplasm of left lung ([address] Melanoma of left upper arm ([address] Secondary malignant neoplasm of left lung; Malignant melanoma of left upper limb, including shoulder COMPARISON: September 17 and June 11 TECHNIQUE: Noncontrast multiplanar CT images of the chest. Images were obtained using automated  exposure control. NOTE:  If there are questions about the content of this report, please contact [address] [place] [address] radiology by calling [phone] FINDINGS: LUNGS/AIRWAYS:  Coarse linear stranding posteriorly in the right lower lobe with increased reticulation in the basilar segments of the right lower lobe. Patchy peripheral airspace disease in the right costophrenic angle has increased in size slightly. PLEURA: New right small effusion. MEDIASTINUM/SUZANNE:  Lobulation adjacent to the origin of the brachiocephalic arteries most consistent with adenopathy in this noncontrast exam. The appearance is stable from September. Enlarged periaortic nodes along the descending aorta again evident and are not significantly changed since September. No change in hilar contour since September. There are calcified nodes. HEART/PERICARDIUM:  Coronary artery calcification. VESSELS:  Unremarkable. No aneurysm CHEST WALL/LOWER NECK:  Unremarkable UPPER ABDOMEN:  Huge increase in amount of abdominal ascites with thickening of the omentum suggestive of carcinomatosis. Upper abdominal adenopathy. Nonobstructing renal calculi. Findings and described in more detail on corresponding abdominal BONES:  No suspicious osteolytic lesion OTHER:  None  IMPRESSION: Stable adenopathy adjacent to the right brachiocephalic vessels and along the descending aorta since September. New small right effusion. Right basilar reticulation and coarse stranding persists.  There is increased patchy peripheral airspace disease in the right costophrenic angle. Development of large amount of ascitic fluid with changes suggestive of carcinomatosis in the abdomen. This is better described on corresponding abdominal CT SIGNED BY: Charley Allen MD on 10/12/2020 12:52 PM    [facility] [phone] -  [address]: [phone]         Xr Chest Portable    Result Date: 10/19/2020  EXAMINATION: ONE XRAY VIEW OF THE CHEST 10/19/2020 2:37 pm COMPARISON: Chest CT September 17, 2020. HISTORY: ORDERING SYSTEM PROVIDED HISTORY: SOB, hypotension, hx lung cancer TECHNOLOGIST PROVIDED HISTORY: Reason for exam:->SOB, hypotension, hx lung cancer Reason for Exam: Shortness of Breath (pt states he has lung CA, increase SOB, worsening today. pt was suppose to be a direct admit but no beds were available. pt currently on PO chemo. pt having abd distention for several days) Acuity: Chronic Type of Exam: Ongoing FINDINGS: There is mild elevation of the right hemidiaphragm. There is questionable small right-sided pleural effusion. There is mild right basilar airspace disease. Calcified granulomas again seen in the left lower lung. No pneumothorax. Cardiac and mediastinal contours are without acute process. No acute osseous abnormality. Right basilar airspace disease, atelectasis versus pneumonia with questionable small right-sided pleural effusion.

## 2020-10-23 LAB
BLOOD CULTURE, ROUTINE: NORMAL
CULTURE, BLOOD 2: NORMAL

## (undated) DEVICE — FORCEPS BX L240CM WRK CHN 2.8MM STD CAP W/ NDL MIC MESH

## (undated) DEVICE — PROCEDURE KIT ENDOSCP CUST

## (undated) DEVICE — SOLUTION IV IRRIG WATER 500ML POUR BRL ST 2F7113

## (undated) DEVICE — GOWN AURORA NONREINF LG: Brand: MEDLINE INDUSTRIES, INC.

## (undated) DEVICE — BW-412T DISP COMBO CLEANING BRUSH: Brand: SINGLE USE COMBINATION CLEANING BRUSH

## (undated) DEVICE — 60 ML SYRINGE,REGULAR TIP: Brand: MONOJECT

## (undated) DEVICE — SET VLV 3 PC AWS DISPOSABLE GRDIAN SCOPEVALET

## (undated) DEVICE — MOUTHPIECE ENDOSCP L CTRL OPN AND SIDE PORTS DISP